# Patient Record
Sex: MALE | Race: WHITE | Employment: UNEMPLOYED | URBAN - METROPOLITAN AREA
[De-identification: names, ages, dates, MRNs, and addresses within clinical notes are randomized per-mention and may not be internally consistent; named-entity substitution may affect disease eponyms.]

---

## 2017-06-23 PROBLEM — K21.9 GERD (GASTROESOPHAGEAL REFLUX DISEASE): Status: ACTIVE | Noted: 2017-06-23

## 2017-06-23 PROBLEM — F41.9 ANXIETY DISORDER: Status: ACTIVE | Noted: 2017-06-23

## 2017-06-23 PROBLEM — F32.A DEPRESSION: Status: ACTIVE | Noted: 2017-06-23

## 2017-06-27 PROBLEM — M25.569 KNEE PAIN: Status: ACTIVE | Noted: 2017-06-27

## 2017-12-12 PROBLEM — R26.9 ABNORMAL GAIT: Status: ACTIVE | Noted: 2017-12-12

## 2018-06-13 PROBLEM — M25.569 KNEE PAIN: Status: RESOLVED | Noted: 2017-06-27 | Resolved: 2018-06-13

## 2018-08-08 RX ORDER — DOXYCYCLINE HYCLATE 100 MG
100 TABLET ORAL 2 TIMES DAILY
Qty: 20 TABLET | Refills: 0 | Status: SHIPPED | OUTPATIENT
Start: 2018-08-08 | End: 2018-08-18

## 2018-09-25 ENCOUNTER — HOSPITAL ENCOUNTER (EMERGENCY)
Age: 58
Discharge: HOME | End: 2018-09-25
Payer: MEDICARE

## 2018-09-25 VITALS
SYSTOLIC BLOOD PRESSURE: 150 MMHG | TEMPERATURE: 98 F | BODY MASS INDEX: 26.34 KG/M2 | HEART RATE: 66 BPM | OXYGEN SATURATION: 98 % | WEIGHT: 184 LBS | HEIGHT: 70 IN | DIASTOLIC BLOOD PRESSURE: 84 MMHG

## 2018-09-25 DIAGNOSIS — R23.3 ABNORMAL BRUISING: Primary | ICD-10-CM

## 2018-09-25 LAB
ALBUMIN SERPL-MCNC: 4.1 G/DL (ref 3.5–5.2)
ALP BLD-CCNC: 118 U/L (ref 40–129)
ALT SERPL-CCNC: 16 U/L (ref 0–40)
ANION GAP SERPL CALCULATED.3IONS-SCNC: 7 MMOL/L (ref 7–16)
APTT: 36.8 SEC (ref 24.5–35.1)
AST SERPL-CCNC: 20 U/L (ref 0–39)
BASOPHILS ABSOLUTE: 0.03 E9/L (ref 0–0.2)
BASOPHILS RELATIVE PERCENT: 0.6 % (ref 0–2)
BILIRUB SERPL-MCNC: <0.2 MG/DL (ref 0–1.2)
BUN BLDV-MCNC: 16 MG/DL (ref 8–23)
CALCIUM SERPL-MCNC: 9 MG/DL (ref 8.6–10.2)
CHLORIDE BLD-SCNC: 101 MMOL/L (ref 98–107)
CO2: 33 MMOL/L (ref 22–29)
CREAT SERPL-MCNC: 0.8 MG/DL (ref 0.7–1.2)
EOSINOPHILS ABSOLUTE: 0.13 E9/L (ref 0.05–0.5)
EOSINOPHILS RELATIVE PERCENT: 2.5 % (ref 0–6)
GFR AFRICAN AMERICAN: >60
GFR NON-AFRICAN AMERICAN: >60 ML/MIN/1.73
GLUCOSE BLD-MCNC: 107 MG/DL (ref 74–109)
HCT VFR BLD CALC: 45.5 % (ref 37–54)
HEMOGLOBIN: 15.3 G/DL (ref 12.5–16.5)
IMMATURE GRANULOCYTES #: 0.02 E9/L
IMMATURE GRANULOCYTES %: 0.4 % (ref 0–5)
INR BLD: 0.9
LYMPHOCYTES ABSOLUTE: 1.91 E9/L (ref 1.5–4)
LYMPHOCYTES RELATIVE PERCENT: 37.2 % (ref 20–42)
MCH RBC QN AUTO: 31.7 PG (ref 26–35)
MCHC RBC AUTO-ENTMCNC: 33.6 % (ref 32–34.5)
MCV RBC AUTO: 94.2 FL (ref 80–99.9)
MONOCYTES ABSOLUTE: 0.62 E9/L (ref 0.1–0.95)
MONOCYTES RELATIVE PERCENT: 12.1 % (ref 2–12)
NEUTROPHILS ABSOLUTE: 2.43 E9/L (ref 1.8–7.3)
NEUTROPHILS RELATIVE PERCENT: 47.2 % (ref 43–80)
PDW BLD-RTO: 14 FL (ref 11.5–15)
PLATELET # BLD: 276 E9/L (ref 130–450)
PMV BLD AUTO: 10.6 FL (ref 7–12)
POTASSIUM SERPL-SCNC: 4.6 MMOL/L (ref 3.5–5)
PROTHROMBIN TIME: 10.1 SEC (ref 9.3–12.4)
RBC # BLD: 4.83 E12/L (ref 3.8–5.8)
SODIUM BLD-SCNC: 141 MMOL/L (ref 132–146)
TOTAL PROTEIN: 6.8 G/DL (ref 6.4–8.3)
WBC # BLD: 5.1 E9/L (ref 4.5–11.5)

## 2018-09-25 PROCEDURE — 36415 COLL VENOUS BLD VENIPUNCTURE: CPT

## 2018-09-25 PROCEDURE — 85025 COMPLETE CBC W/AUTO DIFF WBC: CPT

## 2018-09-25 PROCEDURE — 80053 COMPREHEN METABOLIC PANEL: CPT

## 2018-09-25 PROCEDURE — 99283 EMERGENCY DEPT VISIT LOW MDM: CPT

## 2018-09-25 PROCEDURE — 85730 THROMBOPLASTIN TIME PARTIAL: CPT

## 2018-09-25 PROCEDURE — 85610 PROTHROMBIN TIME: CPT

## 2018-09-26 NOTE — ED PROVIDER NOTES
[facility]    HPI:  9/26/18,   Time: 1:25 AM         Andressa Wilson is a 59 y.o. male presenting to the ED for Ecchymosis to the bilateral forearms, beginning Few days ago. The complaint has been constant, mild in severity, and worsened by nothing. States he called his primary care physician regarding the ecchymosis of the bilateral forearms and was advised to go the emergency room for evaluation. He denies any known coronary injury. He is on no anticoagulants. He denies any liver dysfunction. He denies any shortness of breath. He does take an aspirin daily. If he called the office and was advised he cannot be seen by dermatology until November. ROS:   Pertinent positives and negatives are stated within HPI, all other systems reviewed and are negative.  --------------------------------------------- PAST HISTORY ---------------------------------------------  Past Medical History:  has a past medical history of Anxiety; Depression; Epilepsy ([address] [address] [address] GERD (gastroesophageal reflux disease); Knee pain; Laceration of spleen; and Seizures ([facility] Past Surgical History:  has a past surgical history that includes Ankle surgery (2005); Cholecystectomy (1999); Splenectomy, total (03/04/2015); and Knee arthroscopy (Left, 06/30/2016). Social History:  reports that he has quit smoking. He quit after 5.00 years of use. He quit smokeless tobacco use about 28 years ago. He reports that he does not drink alcohol or use drugs. Family History: family history includes Cancer in his mother; Heart Disease (age of onset: 66) in his father. The patients home medications have been reviewed. Allergies: Patient has no known allergies.     -------------------------------------------------- RESULTS -------------------------------------------------  All laboratory and radiology results have been personally reviewed by myself   LABS:  Results for orders placed or performed during the hospital encounter of 09/25/18

## 2019-02-20 ENCOUNTER — HOSPITAL ENCOUNTER (OUTPATIENT)
Age: 59
Discharge: HOME | End: 2019-02-22
Payer: COMMERCIAL

## 2019-02-20 DIAGNOSIS — Z00.00 ANNUAL PHYSICAL EXAM: ICD-10-CM

## 2019-02-20 DIAGNOSIS — R26.9 ABNORMAL GAIT: ICD-10-CM

## 2019-02-20 DIAGNOSIS — G89.29 CHRONIC MIDLINE LOW BACK PAIN WITHOUT SCIATICA: ICD-10-CM

## 2019-02-20 DIAGNOSIS — G40.909 SEIZURE DISORDER (HCC): ICD-10-CM

## 2019-02-20 DIAGNOSIS — M54.50 CHRONIC MIDLINE LOW BACK PAIN WITHOUT SCIATICA: ICD-10-CM

## 2019-02-20 DIAGNOSIS — F41.8 OTHER SPECIFIED ANXIETY DISORDERS: ICD-10-CM

## 2019-02-20 PROBLEM — F32.A DEPRESSION: Status: RESOLVED | Noted: 2017-06-23 | Resolved: 2019-02-20

## 2019-02-20 LAB
ALBUMIN SERPL-MCNC: 4.1 G/DL (ref 3.5–5.2)
ALP BLD-CCNC: 108 U/L (ref 40–129)
ALT SERPL-CCNC: 13 U/L (ref 0–40)
ANION GAP SERPL CALCULATED.3IONS-SCNC: 9 MMOL/L (ref 7–16)
AST SERPL-CCNC: 18 U/L (ref 0–39)
BASOPHILS ABSOLUTE: 0.03 E9/L (ref 0–0.2)
BASOPHILS RELATIVE PERCENT: 1 % (ref 0–2)
BILIRUB SERPL-MCNC: <0.2 MG/DL (ref 0–1.2)
BILIRUBIN URINE: NEGATIVE
BLOOD, URINE: NEGATIVE
BUN BLDV-MCNC: 15 MG/DL (ref 8–23)
CALCIUM SERPL-MCNC: 9 MG/DL (ref 8.6–10.2)
CHLORIDE BLD-SCNC: 103 MMOL/L (ref 98–107)
CHOLESTEROL, TOTAL: 216 MG/DL (ref 0–199)
CLARITY: CLEAR
CO2: 28 MMOL/L (ref 22–29)
COLOR: YELLOW
CREAT SERPL-MCNC: 0.7 MG/DL (ref 0.7–1.2)
EOSINOPHILS ABSOLUTE: 0.08 E9/L (ref 0.05–0.5)
EOSINOPHILS RELATIVE PERCENT: 2.6 % (ref 0–6)
GFR AFRICAN AMERICAN: >60
GFR NON-AFRICAN AMERICAN: >60 ML/MIN/1.73
GLUCOSE BLD-MCNC: 98 MG/DL (ref 74–99)
GLUCOSE URINE: NEGATIVE MG/DL
HBA1C MFR BLD: 5.1 % (ref 4–5.6)
HCT VFR BLD CALC: 44 % (ref 37–54)
HDLC SERPL-MCNC: 70 MG/DL
HEMOGLOBIN: 14.7 G/DL (ref 12.5–16.5)
IMMATURE GRANULOCYTES #: 0.01 E9/L
IMMATURE GRANULOCYTES %: 0.3 % (ref 0–5)
KETONES, URINE: NEGATIVE MG/DL
LDL CHOLESTEROL CALCULATED: 128 MG/DL (ref 0–99)
LEUKOCYTE ESTERASE, URINE: NEGATIVE
LYMPHOCYTES ABSOLUTE: 1.29 E9/L (ref 1.5–4)
LYMPHOCYTES RELATIVE PERCENT: 42.3 % (ref 20–42)
MCH RBC QN AUTO: 31.4 PG (ref 26–35)
MCHC RBC AUTO-ENTMCNC: 33.4 % (ref 32–34.5)
MCV RBC AUTO: 94 FL (ref 80–99.9)
MONOCYTES ABSOLUTE: 0.38 E9/L (ref 0.1–0.95)
MONOCYTES RELATIVE PERCENT: 12.5 % (ref 2–12)
NEUTROPHILS ABSOLUTE: 1.26 E9/L (ref 1.8–7.3)
NEUTROPHILS RELATIVE PERCENT: 41.3 % (ref 43–80)
NITRITE, URINE: NEGATIVE
PDW BLD-RTO: 14 FL (ref 11.5–15)
PH UA: 7 (ref 5–9)
PHENYTOIN DOSE AMOUNT: NORMAL
PHENYTOIN LEVEL: 18 UG/ML (ref 10–20)
PLATELET # BLD: 280 E9/L (ref 130–450)
PMV BLD AUTO: 11.9 FL (ref 7–12)
POTASSIUM SERPL-SCNC: 4.1 MMOL/L (ref 3.5–5)
PROSTATE SPECIFIC ANTIGEN: 2.19 NG/ML (ref 0–4)
PROTEIN UA: NEGATIVE MG/DL
RBC # BLD: 4.68 E12/L (ref 3.8–5.8)
SODIUM BLD-SCNC: 140 MMOL/L (ref 132–146)
SPECIFIC GRAVITY UA: 1.01 (ref 1–1.03)
TOTAL PROTEIN: 6.9 G/DL (ref 6.4–8.3)
TRIGL SERPL-MCNC: 88 MG/DL (ref 0–149)
TSH SERPL DL<=0.05 MIU/L-ACNC: 1.81 UIU/ML (ref 0.27–4.2)
UROBILINOGEN, URINE: 0.2 E.U./DL
VITAMIN D 25-HYDROXY: 48 NG/ML (ref 30–100)
VLDLC SERPL CALC-MCNC: 18 MG/DL
WBC # BLD: 3.1 E9/L (ref 4.5–11.5)

## 2019-02-20 PROCEDURE — 82306 VITAMIN D 25 HYDROXY: CPT

## 2019-02-20 PROCEDURE — 80061 LIPID PANEL: CPT

## 2019-02-20 PROCEDURE — 83036 HEMOGLOBIN GLYCOSYLATED A1C: CPT

## 2019-02-20 PROCEDURE — 80053 COMPREHEN METABOLIC PANEL: CPT

## 2019-02-20 PROCEDURE — G0103 PSA SCREENING: HCPCS

## 2019-02-20 PROCEDURE — 85025 COMPLETE CBC W/AUTO DIFF WBC: CPT

## 2019-02-20 PROCEDURE — 80185 ASSAY OF PHENYTOIN TOTAL: CPT

## 2019-02-20 PROCEDURE — 84443 ASSAY THYROID STIM HORMONE: CPT

## 2019-02-20 PROCEDURE — 81003 URINALYSIS AUTO W/O SCOPE: CPT

## 2019-03-19 ENCOUNTER — HOSPITAL ENCOUNTER (OUTPATIENT)
Age: 59
Setting detail: OUTPATIENT SURGERY
Discharge: HOME | End: 2019-03-19
Attending: INTERNAL MEDICINE | Admitting: INTERNAL MEDICINE
Payer: COMMERCIAL

## 2019-03-19 ENCOUNTER — ANESTHESIA (OUTPATIENT)
Dept: ENDOSCOPY | Age: 59
End: 2019-03-19
Payer: COMMERCIAL

## 2019-03-19 ENCOUNTER — ANESTHESIA EVENT (OUTPATIENT)
Dept: ENDOSCOPY | Age: 59
End: 2019-03-19
Payer: COMMERCIAL

## 2019-03-19 VITALS
TEMPERATURE: 96.6 F | OXYGEN SATURATION: 97 % | BODY MASS INDEX: 24.08 KG/M2 | WEIGHT: 172 LBS | RESPIRATION RATE: 16 BRPM | HEIGHT: 71 IN | DIASTOLIC BLOOD PRESSURE: 60 MMHG | HEART RATE: 55 BPM | SYSTOLIC BLOOD PRESSURE: 110 MMHG

## 2019-03-19 VITALS
RESPIRATION RATE: 10 BRPM | OXYGEN SATURATION: 97 % | SYSTOLIC BLOOD PRESSURE: 99 MMHG | DIASTOLIC BLOOD PRESSURE: 59 MMHG

## 2019-03-19 PROCEDURE — 6360000002 HC RX W HCPCS: Performed by: NURSE ANESTHETIST, CERTIFIED REGISTERED

## 2019-03-19 PROCEDURE — 3700000000 HC ANESTHESIA ATTENDED CARE: Performed by: INTERNAL MEDICINE

## 2019-03-19 PROCEDURE — 3609010300 HC COLONOSCOPY W/BIOPSY SINGLE/MULTIPLE: Performed by: INTERNAL MEDICINE

## 2019-03-19 PROCEDURE — 7100000011 HC PHASE II RECOVERY - ADDTL 15 MIN: Performed by: INTERNAL MEDICINE

## 2019-03-19 PROCEDURE — 2580000003 HC RX 258: Performed by: ANESTHESIOLOGY

## 2019-03-19 PROCEDURE — 88305 TISSUE EXAM BY PATHOLOGIST: CPT

## 2019-03-19 PROCEDURE — 3700000001 HC ADD 15 MINUTES (ANESTHESIA): Performed by: INTERNAL MEDICINE

## 2019-03-19 PROCEDURE — 2709999900 HC NON-CHARGEABLE SUPPLY: Performed by: INTERNAL MEDICINE

## 2019-03-19 PROCEDURE — 7100000010 HC PHASE II RECOVERY - FIRST 15 MIN: Performed by: INTERNAL MEDICINE

## 2019-03-19 RX ORDER — PROPOFOL 10 MG/ML
INJECTION, EMULSION INTRAVENOUS PRN
Status: DISCONTINUED | OUTPATIENT
Start: 2019-03-19 | End: 2019-03-19 | Stop reason: SDUPTHER

## 2019-03-19 RX ORDER — MIDAZOLAM HYDROCHLORIDE 1 MG/ML
INJECTION INTRAMUSCULAR; INTRAVENOUS PRN
Status: DISCONTINUED | OUTPATIENT
Start: 2019-03-19 | End: 2019-03-19 | Stop reason: SDUPTHER

## 2019-03-19 RX ORDER — 0.9 % SODIUM CHLORIDE 0.9 %
10 VIAL (ML) INJECTION PRN
Status: DISCONTINUED | OUTPATIENT
Start: 2019-03-19 | End: 2019-03-19 | Stop reason: HOSPADM

## 2019-03-19 RX ORDER — 0.9 % SODIUM CHLORIDE 0.9 %
10 VIAL (ML) INJECTION EVERY 12 HOURS SCHEDULED
Status: DISCONTINUED | OUTPATIENT
Start: 2019-03-19 | End: 2019-03-19 | Stop reason: HOSPADM

## 2019-03-19 RX ORDER — FENTANYL CITRATE 50 UG/ML
INJECTION, SOLUTION INTRAMUSCULAR; INTRAVENOUS PRN
Status: DISCONTINUED | OUTPATIENT
Start: 2019-03-19 | End: 2019-03-19 | Stop reason: SDUPTHER

## 2019-03-19 RX ORDER — SODIUM CHLORIDE 0.9 % (FLUSH) 0.9 %
10 SYRINGE (ML) INJECTION PRN
Status: DISCONTINUED | OUTPATIENT
Start: 2019-03-19 | End: 2019-03-19 | Stop reason: HOSPADM

## 2019-03-19 RX ORDER — SODIUM CHLORIDE, SODIUM LACTATE, POTASSIUM CHLORIDE, CALCIUM CHLORIDE 600; 310; 30; 20 MG/100ML; MG/100ML; MG/100ML; MG/100ML
INJECTION, SOLUTION INTRAVENOUS CONTINUOUS
Status: DISCONTINUED | OUTPATIENT
Start: 2019-03-19 | End: 2019-03-19 | Stop reason: HOSPADM

## 2019-03-19 RX ADMIN — FENTANYL CITRATE 50 MCG: 50 INJECTION, SOLUTION INTRAMUSCULAR; INTRAVENOUS at 08:33

## 2019-03-19 RX ADMIN — MIDAZOLAM 2 MG: 1 INJECTION INTRAMUSCULAR; INTRAVENOUS at 08:33

## 2019-03-19 RX ADMIN — FENTANYL CITRATE 50 MCG: 50 INJECTION, SOLUTION INTRAMUSCULAR; INTRAVENOUS at 08:39

## 2019-03-19 RX ADMIN — PROPOFOL 250 MG: 10 INJECTION, EMULSION INTRAVENOUS at 08:50

## 2019-03-19 RX ADMIN — SODIUM CHLORIDE, POTASSIUM CHLORIDE, SODIUM LACTATE AND CALCIUM CHLORIDE: 600; 310; 30; 20 INJECTION, SOLUTION INTRAVENOUS at 07:14

## 2019-03-19 ASSESSMENT — PAIN SCALES - GENERAL: PAINLEVEL_OUTOF10: 0

## 2019-03-19 ASSESSMENT — PAIN - FUNCTIONAL ASSESSMENT: PAIN_FUNCTIONAL_ASSESSMENT: 0-10

## 2019-03-19 ASSESSMENT — ENCOUNTER SYMPTOMS: SHORTNESS OF BREATH: 0

## 2019-05-25 ENCOUNTER — APPOINTMENT (OUTPATIENT)
Dept: CT IMAGING | Age: 59
End: 2019-05-25
Payer: COMMERCIAL

## 2019-05-25 ENCOUNTER — NURSE TRIAGE (OUTPATIENT)
Dept: OTHER | Facility: CLINIC | Age: 59
End: 2019-05-25

## 2019-05-25 ENCOUNTER — APPOINTMENT (OUTPATIENT)
Dept: GENERAL RADIOLOGY | Age: 59
End: 2019-05-25
Payer: COMMERCIAL

## 2019-05-25 ENCOUNTER — HOSPITAL ENCOUNTER (EMERGENCY)
Age: 59
Discharge: HOME | End: 2019-05-25
Attending: EMERGENCY MEDICINE
Payer: COMMERCIAL

## 2019-05-25 VITALS
OXYGEN SATURATION: 97 % | TEMPERATURE: 98.3 F | RESPIRATION RATE: 16 BRPM | DIASTOLIC BLOOD PRESSURE: 75 MMHG | BODY MASS INDEX: 26.32 KG/M2 | WEIGHT: 188 LBS | HEART RATE: 78 BPM | SYSTOLIC BLOOD PRESSURE: 113 MMHG | HEIGHT: 71 IN

## 2019-05-25 DIAGNOSIS — S01.511A LIP LACERATION, INITIAL ENCOUNTER: ICD-10-CM

## 2019-05-25 DIAGNOSIS — S00.12XA CONTUSION OF LEFT EYELID, INITIAL ENCOUNTER: ICD-10-CM

## 2019-05-25 DIAGNOSIS — M25.511 RIGHT SHOULDER PAIN, UNSPECIFIED CHRONICITY: ICD-10-CM
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
## 2019-05-25 DIAGNOSIS — S09.90XA CLOSED HEAD INJURY, INITIAL ENCOUNTER: ICD-10-CM

## 2019-05-25 PROCEDURE — 99284 EMERGENCY DEPT VISIT MOD MDM: CPT

## 2019-05-25 PROCEDURE — 73030 X-RAY EXAM OF SHOULDER: CPT

## 2019-05-25 PROCEDURE — 12013 RPR F/E/E/N/L/M 2.6-5.0 CM: CPT

## 2019-05-25 PROCEDURE — 70486 CT MAXILLOFACIAL W/O DYE: CPT

## 2019-05-25 PROCEDURE — 72125 CT NECK SPINE W/O DYE: CPT

## 2019-05-25 PROCEDURE — 70450 CT HEAD/BRAIN W/O DYE: CPT

## 2019-05-25 RX ORDER — M-VIT,TX,IRON,MINS/CALC/FOLIC 27MG-0.4MG
1 TABLET ORAL DAILY
COMMUNITY

## 2019-05-25 RX ORDER — CYCLOBENZAPRINE HCL 10 MG
10 TABLET ORAL ONCE
Status: DISCONTINUED | OUTPATIENT
Start: 2019-05-25 | End: 2019-05-25 | Stop reason: HOSPADM

## 2019-05-25 RX ORDER — CYCLOBENZAPRINE HCL 5 MG
5 TABLET ORAL 2 TIMES DAILY PRN
Qty: 15 TABLET | Refills: 0 | Status: SHIPPED | OUTPATIENT
Start: 2019-05-25 | End: 2019-06-04

## 2019-05-25 ASSESSMENT — ENCOUNTER SYMPTOMS
COUGH: 0
SORE THROAT: 0
CHEST TIGHTNESS: 0
SHORTNESS OF BREATH: 0
SINUS PAIN: 0
BACK PAIN: 0
NAUSEA: 0
DIARRHEA: 0
VOMITING: 0
ABDOMINAL PAIN: 0

## 2019-05-25 NOTE — ED NOTES
Patient taken for xray and CT scan via cart by transport staff.      Godwin Fallon RN  05/25/19 [id number]

## 2019-05-25 NOTE — ED PROVIDER NOTES
This is a 49-year-old male that presents after a fall today at work. RRT called after he had a fall and is brought down to the emergency department. He has a contusion to his left eyebrow and a laceration to his lip. He denies any pain. Review of Systems   Constitutional: Negative for activity change, chills, fatigue and fever. HENT: Negative for congestion, sinus pain and sore throat. Eyes: Negative for visual disturbance. Respiratory: Negative for cough, chest tightness and shortness of breath. Cardiovascular: Negative for chest pain, palpitations and leg swelling. Gastrointestinal: Negative for abdominal pain, diarrhea, nausea and vomiting. Genitourinary: Negative for dysuria and urgency. Musculoskeletal: Negative for back pain, neck pain and neck stiffness. Skin: Negative for rash. Neurological: Negative for dizziness, syncope and headaches. Psychiatric/Behavioral: Negative for confusion. Physical Exam   Constitutional: He is oriented to person, place, and time. He appears well-developed and well-nourished. No distress. HENT:   Head: Normocephalic and atraumatic. Nose: Nose normal.   Mouth/Throat: Oropharynx is clear and moist. No oropharyngeal exudate. Contusion above the L eye, pain with palpation    There is an approximately 3 cm laceration to the L inner lip, small laceration on the outer lip. No crossing of the vermilion border. No obvious dental or lingual injury. Eyes: Pupils are equal, round, and reactive to light. Conjunctivae and EOM are normal.   Neck: Normal range of motion. Neck supple. Cardiovascular: Normal rate, regular rhythm, normal heart sounds and intact distal pulses. Pulmonary/Chest: Effort normal and breath sounds normal. No respiratory distress. He exhibits no tenderness. Abdominal: Soft. Bowel sounds are normal. He exhibits no distension. There is no tenderness. Musculoskeletal: Normal range of motion. He exhibits no edema. Neurological: He is alert and oriented to person, place, and time. No cranial nerve deficit or sensory deficit. He exhibits normal muscle tone. Skin: Skin is warm and dry. Capillary refill takes less than 2 seconds. He is not diaphoretic. Psychiatric: He has a normal mood and affect. His behavior is normal. Judgment and thought content normal.   Nursing note and vitals reviewed. Lac Repair  Date/Time: 5/25/2019 3:46 PM  Performed by: Maria Elena Lazaro DO  Authorized by: Tim Schneider DO     Consent:     Consent obtained:  Verbal    Consent given by:  Patient    Risks discussed:  Infection, pain, poor cosmetic result, poor wound healing, nerve damage and need for additional repair  Anesthesia (see MAR for exact dosages): Anesthesia method:  Local infiltration    Local anesthetic:  Lidocaine 1% w/o epi  Laceration details:     Location:  Lip    Length (cm):  3  Repair type:     Repair type:  Simple  Pre-procedure details:     Preparation:  Patient was prepped and draped in usual sterile fashion and imaging obtained to evaluate for foreign bodies  Exploration:     Contaminated: no    Treatment:     Area cleansed with:  Saline    Amount of cleaning:  Extensive    Irrigation solution:  Sterile water    Irrigation method:  Pressure wash  Skin repair:     Repair method:  Sutures    Suture size:  4-0    Suture material:  Chromic gut    Suture technique:  Simple interrupted    Number of sutures:  5  Approximation:     Approximation:  Close    Vermilion border: well-aligned    Post-procedure details:     Dressing:  Open (no dressing)    Patient tolerance of procedure: Tolerated well, no immediate complications        MDM  Number of Diagnoses or Management Options  Diagnosis management comments: 15-year-old male presents after a fall today at work. He lost his balance, did not have a syncopal event. Denies any dizziness, chest pain or shortness of breath.  He has a history of falls, this is not abnormal for him. Currently, he denies pain. He has a contusion to the left eyebrow and a laceration to the left lip. Tetanus is up-to-date. ED Course as of May 25 1558   Sat May 25, 2019   1325 ATTENDING PROVIDER ATTESTATION:     I have personally performed and/or participated in the history, exam, medical decision making, and procedures and agree with all pertinent clinical information unless otherwise noted. I have also reviewed and agree with the past medical, family and social history unless otherwise noted. I have discussed this patient in detail with the resident, and provided the instruction and education regarding patient here after a rapid response upstairs. He states that he lost his balance and fell striking the left side of his face and head. Denies loss of consciousness. Denies lightheadedness. Denies neck or back pain. Denies arm or leg pain or injuries. States that he has trouble with his balance chronically and has fallen before. Wears a brace on his leg. Denies chest pain or abdominal pain or other injuries. .  My findings/plan: Patient with some bruising above the left eyebrow region with an abrasion but no deep laceration. Has a small laceration to the left upper lip. Bleeding controlled. No dental injury. No other facial bone tenderness or injuries. No other sign of acute head or face injury. No septal hematoma. No hemotympanum. Chest wall nontender. Breath sounds are equal. Heart rate regular. Abdomen soft and nontender in all quadrants. Arms and legs show no signs of acute bony or joint injuries. He is awake and alert and conversant. [NC]   7006 Sutures applied, bleeding has stopped. Patient feels well.     [CW]      ED Course User Index  [CW] Michael Wong DO  [NC] Alyssa Dan DO       --------------------------------------------- PAST HISTORY ---------------------------------------------  Past Medical History:  has a past medical history of Anxiety, Depression, Epilepsy ([facility] GERD (gastroesophageal reflux disease), Knee pain, Laceration of spleen, and Seizures ([facility] Past Surgical History:  has a past surgical history that includes Ankle surgery (2005); Cholecystectomy (1999); Splenectomy, total (03/04/2015); Knee arthroscopy (Left, 06/30/2016); and Colonoscopy (N/A, 3/19/2019). Social History:  reports that he has quit smoking. He quit after 5.00 years of use. He quit smokeless tobacco use about 29 years ago. He reports that he drinks alcohol. He reports that he does not use drugs. Family History: family history includes Cancer in his mother; Heart Disease (age of onset: 66) in his father. The patients home medications have been reviewed. Allergies: Patient has no known allergies. -------------------------------------------------- RESULTS -------------------------------------------------  Labs:  No results found for this visit on 05/25/19. Radiology:  XR SHOULDER RIGHT (MIN 2 VIEWS)   Final Result   No fracture, dislocation, soft tissue or osseous   abnormality. CT Head WO Contrast   Final Result   1. There is no acute intracranial hemorrhage. 2. 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 3. Left supraorbital soft tissue hematoma      CT Cervical Spine WO Contrast   Final Result   1. There is no gross fracture or subluxation of the cervical spine   2. Motion artifact degrades the images. CT FACIAL BONES WO CONTRAST   Final Result   1. Left supraorbital soft tissue contusion/hematoma. 2. There is no fracture of the maxillofacial region. 3. The paranasal sinuses are well pneumatized.              ------------------------- NURSING NOTES AND VITALS REVIEWED ---------------------------  Date / Time Roomed:  5/25/2019  1:03 PM  ED Bed Assignment:  14/14    The nursing notes within the ED encounter and vital signs as below have been reviewed.    /75   Pulse 78   Temp 98.3 °F (36.8 °C) (Oral)   Resp 16   Ht 5' 10.5\" (1.791 m)   Wt

## 2019-06-24 ENCOUNTER — OFFICE VISIT (OUTPATIENT)
Dept: ORTHOPEDIC SURGERY | Age: 59
End: 2019-06-24
Payer: COMMERCIAL

## 2019-06-24 VITALS — HEIGHT: 71 IN | BODY MASS INDEX: 25.48 KG/M2 | WEIGHT: 182 LBS

## 2019-06-24 DIAGNOSIS — S40.021A CONTUSION, SHOULDER AND UPPER ARM, MULTIPLE SITES, RIGHT, INITIAL ENCOUNTER: Primary | ICD-10-CM

## 2019-06-24 DIAGNOSIS — S40.011A CONTUSION, SHOULDER AND UPPER ARM, MULTIPLE SITES, RIGHT, INITIAL ENCOUNTER: Primary | ICD-10-CM

## 2019-06-24 PROCEDURE — 99203 OFFICE O/P NEW LOW 30 MIN: CPT | Performed by: ORTHOPAEDIC SURGERY

## 2019-06-24 NOTE — PROGRESS NOTES
Washington Cramer is a 62 y.o. male, who presents   Chief Complaint   Patient presents with    New Patient     new patient for right shoulder, patient states he fell and landed on his shoulder. He does have films in [employer], he describes his pain as stabbing. HPI[de-identified] Work-related injury occurred 5/25/2019. Patient is employed at [employer] in housekeeping. He was on sixth floor and apparently reach for the railing on the wall and missed this. He struck his right arm and then went towards his left side and hit his face in multiple places resulting in a laceration to his left upper lip and also a contusion to his left orbital area. He did go to the emergency room at [employer] and was seen by Dr. Lita Avina. X-rays of the right shoulder were taken there and his lip was sutured up and he was referred for follow-up at [facility]. He has continued to have pain in the right shoulder and the mid right arm area. His lip seems to be healing up well and the sutures apparently have dissolved. He denies any numbness but has pain with certain movements of his right arm and shoulder. Allergies; medications; past medical, surgical, family, and social history; and problem list have been reviewed today and updated as indicated in this encounter - see below following Ortho specifics. Musculoskeletal: The left orbital area color is returning to normal.  There are no lacerations. There is no obvious swelling at this time. The left upper lip looks good now. I see no sutures in place. The right upper extremity has good skin condition and gross neurovascular function. There is some guarded range of motion in the right shoulder with discomfort at upper elevations above 150 degrees of elevation. Strength is guarded because of the discomfort. The acromioclavicular and glenohumeral joints are grossly stable. There is crepitus in the arch area with range of motion of the shoulder.   Strength is fair to good with no grossly obvious cuff tear though examination was somewhat limited by discomfort and resultant effort. Tenderness is present with slight prominence in the lateral mid arm with no discoloration. Radiologic Studies: Imaging 2 views of the right shoulder 5/25/2019 shows some narrowing of the acromioclavicular joint but good contours and joint space in the glenohumeral joint and also in the subacromial space. ASSESSMENT:  Jemima was seen today for new patient. Diagnoses and all orders for this visit:    Contusion, shoulder and upper arm, multiple sites, right, initial encounter     Treatment alternatives were reviewed including medical and physical therapies, injections, and surgical options, expected risks benefits and likely outcome of each were discussed in detail, questions asked and answered and understood. PLAN: We will seek approval for physical therapy for the right arm. Follow-up here in 3 weeks after beginning of therapy.         Patient Active Problem List   Diagnosis    Seizure disorder ([address]    Diastolic murmur    Anxiety disorder    Gastroesophageal reflux disease without esophagitis    Abnormal gait       Past Medical History:   Diagnosis Date    Anxiety     Depression     Epilepsy ([address]     GERD (gastroesophageal reflux disease)     Knee pain     Laceration of spleen     Seizures ([address]     last seizure 1996  controlled with dilantin       Past Surgical History:   Procedure Laterality Date    ANKLE SURGERY  2005    right ankle has plates and screws    CHOLECYSTECTOMY  1999    COLONOSCOPY N/A 3/19/2019    COLONOSCOPY WITH BIOPSY performed by Vivien Taylor DO at [facility] ARTHROSCOPY Left 06/30/2016    Arthroscopy left knee with synovectomy chrondroplasty lateral menisectomy and debridement tear anterior cruciate ligament    SPLENECTOMY, TOTAL  03/04/2015    Dr Emiliana Quintanilla       Current Outpatient Medications   Medication Sig Dispense Refill    ibuprofen (ADVIL;MOTRIN) 800 MG tablet Take 1 tablet by mouth 4 times daily as needed for Pain 120 tablet 1    Multiple Vitamins-Minerals (THERAPEUTIC MULTIVITAMIN-MINERALS) tablet Take 1 tablet by mouth daily      phenytoin (DILANTIN) 100 MG ER capsule Take 1 capsule by mouth 3 times daily 270 capsule 1    omeprazole (PRILOSEC) 20 MG delayed release capsule Take 1 capsule by mouth daily 90 capsule 1    vitamin D (CHOLECALCIFEROL) 1000 UNIT TABS tablet Take 1,000 Units by mouth daily       No current facility-administered medications for this visit.       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[employer]   Social Needs    Financial resource strain: None    Food insecurity:     Worry: None     Inability: None    Transportation needs:     Medical: None     Non-medical: None   Tobacco Use    Smoking status: Former Smoker     Years: 5.00    Smokeless tobacco: Former User     Quit date: 1990   Substance and Sexual Activity    Alcohol use: Yes     Comment: occasional    Drug use: No    Sexual activity: None   Lifestyle    Physical activity:     Days per week: None     Minutes per session: None    Stress: None   Relationships    Social connections:     Talks on phone: None     Gets together: None     Attends [employer] service: None     Active member of club or organization: None     Attends meetings of clubs or organizations: None     Relationship status: None    Intimate partner violence:     Fear of current or ex partner: None     Emotionally abused: None     Physically abused: None Forced sexual activity: None   Other Topics Concern    None   Social History Narrative    None       Family History   Problem Relation Age of Onset   [de-identified] Cancer Mother          at age of 66, breast cancer    Heart Disease Father 66         Review of Systems:   As follows except as previously noted in HPI:  Constitutional: Negative for chills, diaphoresis,  fever   Respiratory: Negative for cough, shortness of breath and wheezing. Cardiovascular: Negative for chest pain and palpitations. Neurological: Negative for dizziness, syncope,   GI / : abdominal pain or cramping  Musculoskeletal: see HPI       Objective:   Physical Exam   Constitutional: Oriented to person, place, and time. and appears well-developed and well-nourished. :   Head: Normocephalic and atraumatic. Neck: Neck supple. Eyes: EOM are normal.   Pulmonary/Chest: Effort normal.  No respiratory distress, no wheezes. Neurological: Alert and oriented to person  Skin: Skin is warm and dry. Sera King DO    19  10:29 AM    All reasonable efforts have been made to minimize the risk of errors that may occur in the use of voice recognition and other electronic means of charting.

## 2019-07-15 ENCOUNTER — OFFICE VISIT (OUTPATIENT)
Dept: ORTHOPEDIC SURGERY | Age: 59
End: 2019-07-15
Payer: COMMERCIAL

## 2019-07-15 VITALS — BODY MASS INDEX: 25.48 KG/M2 | WEIGHT: 182 LBS | HEIGHT: 71 IN

## 2019-07-15 DIAGNOSIS — S40.021A CONTUSION, SHOULDER AND UPPER ARM, MULTIPLE SITES, RIGHT, INITIAL ENCOUNTER: Primary | ICD-10-CM

## 2019-07-15 DIAGNOSIS — M79.601 RIGHT ARM PAIN: ICD-10-CM

## 2019-07-15 DIAGNOSIS — S40.011A CONTUSION, SHOULDER AND UPPER ARM, MULTIPLE SITES, RIGHT, INITIAL ENCOUNTER: Primary | ICD-10-CM

## 2019-07-15 PROCEDURE — 99212 OFFICE O/P EST SF 10 MIN: CPT | Performed by: ORTHOPAEDIC SURGERY

## 2019-07-23 ENCOUNTER — EVALUATION (OUTPATIENT)
Dept: PHYSICAL THERAPY | Age: 59
End: 2019-07-23
Payer: COMMERCIAL

## 2019-07-23 DIAGNOSIS — M79.601 PAIN, ARM, RIGHT: ICD-10-CM

## 2019-07-23 DIAGNOSIS — S40.011A CONTUSION OF MULTIPLE SITES OF SHOULDER, RIGHT, INITIAL ENCOUNTER: Primary | ICD-10-CM

## 2019-07-23 PROCEDURE — 97163 PT EVAL HIGH COMPLEX 45 MIN: CPT | Performed by: PHYSICAL THERAPIST

## 2019-07-23 PROCEDURE — 97110 THERAPEUTIC EXERCISES: CPT | Performed by: PHYSICAL THERAPIST

## 2019-07-25 ENCOUNTER — TREATMENT (OUTPATIENT)
Dept: PHYSICAL THERAPY | Age: 59
End: 2019-07-25
Payer: COMMERCIAL

## 2019-07-25 DIAGNOSIS — M79.601 PAIN, ARM, RIGHT: ICD-10-CM

## 2019-07-25 DIAGNOSIS — S40.011A CONTUSION OF MULTIPLE SITES OF SHOULDER, RIGHT, INITIAL ENCOUNTER: Primary | ICD-10-CM

## 2019-07-25 PROCEDURE — 97110 THERAPEUTIC EXERCISES: CPT

## 2019-07-25 NOTE — PROGRESS NOTES
Physical Therapy Daily Treatment Note    Date: 2019  Patient Name: Kimberley Burn  : 1960   MRN: [id number]  DOInjury: 19  DOSx:    Referring Provider:  Jj Zaidi DO     Medical Diagnosis:      Diagnosis Orders   1. Contusion of multiple sites of shoulder, right, initial encounter     2. Pain, arm, right         Outcome Measure:      S: pt reports sore today  O: Pt given written HEP  Time 5470-6771     Visit 2/ Repeat outcome measure at mid point and end. Pain 7/10     ROM AROM: 130° Forward elevation,  15° ER,  IR to R PSIS  PROM: 135° Forward elevation,  90° ER,  20° IR     Modalities            Manual                  Stretch      Table slides      Wall Flexion      Wall ER stretch      Towel IR stretch 30x     IR reaching behind back      Exercise      Shrugs AROM      Pendulum Ex 2 x 20     UBE      Pulleys - flex 4 min     Pulleys-IR 3 min     Supine wand chest press 2 x 20     Supine wand flex 2 x 20     Supine wand ER/IR 2 x 20     Supine flexion      S-lying ABD      S-lying ER      Standing wand flex 2 x 20     Standing flexion      Standing ABD            ROWS: H Functional activities  To aid in ROM and strength needed for reaching , lifting ,pushing and pulling at home/work    ROWS: M  \"    ROWS: L  \"    ER  \"    IR  \"                A:  Tolerated well. Above added to written HEP.   P: Continue with rehab plan  Amado Bartlett, PTA    Treatment Charges: Mins Units   Initial Evaluation     Re-Evaluation     Ther Exercise         TE 30 2   Manual Therapy     MT     Ther Activities        TA     Gait Training          GT     Neuro Re-education NR     Modalities     Non-Billable Service Time     Other     Total Time/Units 30 2

## 2019-07-31 ENCOUNTER — TREATMENT (OUTPATIENT)
Dept: PHYSICAL THERAPY | Age: 59
End: 2019-07-31
Payer: COMMERCIAL

## 2019-07-31 DIAGNOSIS — M79.601 PAIN, ARM, RIGHT: ICD-10-CM

## 2019-07-31 DIAGNOSIS — S40.011A CONTUSION OF MULTIPLE SITES OF SHOULDER, RIGHT, INITIAL ENCOUNTER: Primary | ICD-10-CM

## 2019-07-31 PROCEDURE — 97110 THERAPEUTIC EXERCISES: CPT

## 2019-07-31 NOTE — PROGRESS NOTES
Physical Therapy Daily Treatment Note    Date: 2019  Patient Name: Bradley Shen  : 1960   MRN: [id number]  DOInjury: 19  DOSx:    Referring Provider:  Celena Mahmood DO     Medical Diagnosis:      Diagnosis Orders   1. Contusion of multiple sites of shoulder, right, initial encounter     2. Pain, arm, right         Outcome Measure: Outcome Measure:   QuickDASH (Disorders of the Arm, Shoulder, and Hand) 41% disability    S: pt reports sore today, no changes  O: Pt given written HEP  Time 7762-9945     Visit 3/ Repeat outcome measure at mid point and end. Pain 7/10     ROM AROM: 130° Forward elevation,  15° ER,  IR to R PSIS  PROM: 135° Forward elevation,  90° ER,  20° IR     Modalities            Manual                  Stretch      Table slides      Wall Flexion      Wall ER stretch      Towel IR stretch 30x     IR reaching behind back      Exercise      Shrugs AROM      Pendulum Ex 2 x 20     UBE      Pulleys - flex 4 min     Pulleys-IR 3 min     Supine wand chest press 2 x 20     Supine wand flex 2 x 20     Supine wand ER/IR 2 x 20     Supine flexion      S-lying ABD      S-lying ER      Standing wand flex 2 x 20     Standing wand IR 2 x 20     Standing ABD            ROWS: H Functional activities  To aid in ROM and strength needed for reaching , lifting ,pushing and pulling at home/work    ROWS: M  \"    ROWS: L  \"    ER  \"    IR  \"                A:  Tolerated well. Above added to written HEP.   P: Continue with rehab plan  Daisy Pham PTA    Treatment Charges: Mins Units   Initial Evaluation     Re-Evaluation     Ther Exercise         TE 30 2   Manual Therapy     MT     Ther Activities        TA     Gait Training          GT     Neuro Re-education NR     Modalities     Non-Billable Service Time     Other     Total Time/Units 30 2

## 2019-08-02 ENCOUNTER — TREATMENT (OUTPATIENT)
Dept: PHYSICAL THERAPY | Age: 59
End: 2019-08-02
Payer: COMMERCIAL

## 2019-08-02 DIAGNOSIS — S40.011A CONTUSION OF MULTIPLE SITES OF SHOULDER, RIGHT, INITIAL ENCOUNTER: Primary | ICD-10-CM

## 2019-08-02 DIAGNOSIS — M79.601 PAIN, ARM, RIGHT: ICD-10-CM

## 2019-08-02 PROCEDURE — 97110 THERAPEUTIC EXERCISES: CPT

## 2019-08-05 ENCOUNTER — TREATMENT (OUTPATIENT)
Dept: PHYSICAL THERAPY | Age: 59
End: 2019-08-05
Payer: COMMERCIAL

## 2019-08-05 ENCOUNTER — OFFICE VISIT (OUTPATIENT)
Dept: ORTHOPEDIC SURGERY | Age: 59
End: 2019-08-05
Payer: COMMERCIAL

## 2019-08-05 VITALS — HEIGHT: 71 IN | BODY MASS INDEX: 25.9 KG/M2 | WEIGHT: 185 LBS

## 2019-08-05 DIAGNOSIS — S40.011A CONTUSION, SHOULDER AND UPPER ARM, MULTIPLE SITES, RIGHT, INITIAL ENCOUNTER: Primary | ICD-10-CM

## 2019-08-05 DIAGNOSIS — M79.601 RIGHT ARM PAIN: ICD-10-CM

## 2019-08-05 DIAGNOSIS — S40.021A CONTUSION, SHOULDER AND UPPER ARM, MULTIPLE SITES, RIGHT, INITIAL ENCOUNTER: Primary | ICD-10-CM

## 2019-08-05 DIAGNOSIS — S40.011A CONTUSION OF MULTIPLE SITES OF SHOULDER, RIGHT, INITIAL ENCOUNTER: Primary | ICD-10-CM

## 2019-08-05 DIAGNOSIS — M79.601 PAIN, ARM, RIGHT: ICD-10-CM

## 2019-08-05 PROCEDURE — 97110 THERAPEUTIC EXERCISES: CPT | Performed by: PHYSICAL THERAPIST

## 2019-08-05 PROCEDURE — 99212 OFFICE O/P EST SF 10 MIN: CPT | Performed by: ORTHOPAEDIC SURGERY

## 2019-08-05 NOTE — PROGRESS NOTES
Chief Complaint:   Chief Complaint   Patient presents with    Follow-up     follow up from right shoulder pain, patient is going to PT 2 x a week. [address] Sant[address]aria has improved with physical therapy which she is taken here at [facility]. Range of motion is increased some he still has some discomfort with certain maneuvers of his arm he indicates pain in the lateral arm actually closer to the elbow than the shoulder. He feels tight when he takes the arm into slight abduction and extension. Allergies; medications; past medical, surgical, family, and social history; and problem list have been reviewed today and updated as indicated in this encounter seen below. Exam: Skin condition gross neurovascular function are good in the right upper extremity. There is prominence at the [facility] joint or at least the distal clavicle with some enlargement in this area. Active range of motion of the shoulder is to 90 degrees or more flexion. He is able to take it into extension of about 15 to 20 degrees. He guards abduction and extension as mentioned. Abduction is somewhat uncomfortable for him and elevation and external rotation is also uncomfortable though he can get to the occiput. Internal rotation is to the right sacroiliac joint. Cuff power is fair. ASSESSMENT:    Jemima was seen today for follow-up. Diagnoses and all orders for this visit:    Contusion, shoulder and upper arm, multiple sites, right, initial encounter    Right arm pain        PLAN: Continue physical therapy and home exercises. Will follow in 4 weeks. Will be to give this rotator cuff a chance to strengthen if it will before further evaluation. Return in about 4 weeks (around 9/2/2019).        Current Outpatient Medications   Medication Sig Dispense Refill    ibuprofen (ADVIL;MOTRIN) 800 MG tablet Take 1 tablet by mouth 4 times daily as needed for Pain 120 tablet 1    Multiple Vitamins-Minerals (THERAPEUTIC

## 2019-08-07 ENCOUNTER — TREATMENT (OUTPATIENT)
Dept: PHYSICAL THERAPY | Age: 59
End: 2019-08-07
Payer: COMMERCIAL

## 2019-08-07 DIAGNOSIS — S40.011A CONTUSION OF MULTIPLE SITES OF SHOULDER, RIGHT, INITIAL ENCOUNTER: Primary | ICD-10-CM

## 2019-08-07 DIAGNOSIS — M79.601 PAIN, ARM, RIGHT: ICD-10-CM

## 2019-08-07 PROCEDURE — 97110 THERAPEUTIC EXERCISES: CPT

## 2019-08-13 ENCOUNTER — TREATMENT (OUTPATIENT)
Dept: PHYSICAL THERAPY | Age: 59
End: 2019-08-13
Payer: COMMERCIAL

## 2019-08-13 DIAGNOSIS — M79.601 PAIN, ARM, RIGHT: ICD-10-CM

## 2019-08-13 DIAGNOSIS — S40.011A CONTUSION OF MULTIPLE SITES OF SHOULDER, RIGHT, INITIAL ENCOUNTER: Primary | ICD-10-CM

## 2019-08-13 PROCEDURE — 97110 THERAPEUTIC EXERCISES: CPT | Performed by: PHYSICAL THERAPIST

## 2019-08-14 ENCOUNTER — TREATMENT (OUTPATIENT)
Dept: PHYSICAL THERAPY | Age: 59
End: 2019-08-14
Payer: COMMERCIAL

## 2019-08-14 DIAGNOSIS — M79.601 PAIN, ARM, RIGHT: ICD-10-CM

## 2019-08-14 DIAGNOSIS — S40.011A CONTUSION OF MULTIPLE SITES OF SHOULDER, RIGHT, INITIAL ENCOUNTER: Primary | ICD-10-CM

## 2019-08-14 PROCEDURE — 97110 THERAPEUTIC EXERCISES: CPT

## 2019-08-14 NOTE — PROGRESS NOTES
Physical Therapy Daily Treatment Note    Date: 2019  Patient Name: Chidi Tafoya  : 1960   MRN: [id number]  DOInjury: 19  DOSx:    Referring Provider:  Yousuf Miguel DO     Medical Diagnosis:       Diagnosis Orders   1. Contusion of multiple sites of shoulder, right, initial encounter      2. Pain, arm, right         Outcome Measure: Outcome Measure:   QuickDASH (Disorders of the Arm, Shoulder, and Hand) 41% disability    S: Pt con't to need to use Ibuprofen every morning for pain relief. Pt is also noticing tingling down into R hand. Pt found out today that he no longer has his job. O: Pt given written HEP  Time 1:00-1:45     Visit 7/ Repeat outcome measure at mid point and end. Pain 8/10     ROM AROM: 130° Forward elevation,  15° ER,  IR to R PSIS  PROM: 135° Forward elevation,  90° ER,  20° IR     Modalities            Manual                  Stretch      Table slides      Wall Flexion      Wall ER stretch      Towel IR stretch 30x  TE   IR reaching behind back      Exercise      Shrugs AROM      Pendulum Ex 2 x 20  TE   UBE      Pulleys - flex 4 min  TE   Pulleys-IR 3 min  TE   Supine wand chest press 2 x 20 Black TE   Supine wand flex 2 x 20 \" TE   Supine wand ER/IR 2 x 20 \" TE   Supine flexion 1# 2 x 20  TE   S-lying ABD      S-lying ER 1# 2 x 20  TE   Standing wand flex 2 x 20 Black  TE   Standing wand IR 2 x 20 \" TE   Standing ABD            ROWS: H Blue 2 x 20  TE   ROWS: M Blue 2 x 20  TE   ROWS: L      ER      IR      Shoulder shrugs 4# 2 x 20  TE   Forward raise 4# 2 x 20  TE   Shoulder press 4# 2 x 20  TE         A:  Tolerated well. Above added to written HEP.   P: Continue with rehab plan  Allan Bernabe PTA    Treatment Charges: Mins Units   Initial Evaluation     Re-Evaluation     Ther Exercise         TE 30 2   Manual Therapy     MT     Ther Activities        TA     Gait Training          GT     Neuro Re-education NR     Modalities     Non-Billable Service Time

## 2019-08-16 ENCOUNTER — TREATMENT (OUTPATIENT)
Dept: PHYSICAL THERAPY | Age: 59
End: 2019-08-16

## 2019-08-16 DIAGNOSIS — S40.011A CONTUSION OF MULTIPLE SITES OF SHOULDER, RIGHT, INITIAL ENCOUNTER: Primary | ICD-10-CM

## 2019-08-16 DIAGNOSIS — M79.601 PAIN, ARM, RIGHT: ICD-10-CM

## 2019-08-16 PROCEDURE — 97110 THERAPEUTIC EXERCISES: CPT

## 2019-08-16 NOTE — PROGRESS NOTES
Physical Therapy Daily Treatment Note    Date: 2019  Patient Name: Kimberley Burn  : 1960   MRN: [id number]  DOInjury: 19  DOSx:    Referring Provider:  Jj Zaidi DO     Medical Diagnosis:       Diagnosis Orders   1. Contusion of multiple sites of shoulder, right, initial encounter      2. Pain, arm, right         Outcome Measure: Outcome Measure:   QuickDASH (Disorders of the Arm, Shoulder, and Hand) 41% disability    S: Pt con't to need to use Ibuprofen every morning for pain relief. Pt is also noticing tingling down into R hand. O: Pt given written HEP  Time 1:30-2:15     Visit 8/ Repeat outcome measure at mid point and end. Pain 7/10     ROM AROM: 130° Forward elevation,  15° ER,  IR to R PSIS  PROM: 135° Forward elevation,  90° ER,  20° IR     Modalities            Manual                  Stretch      Table slides      Wall Flexion      Wall ER stretch      Towel IR stretch 30x HEP TE   IR reaching behind back      Exercise      Shrugs AROM      Pendulum Ex   TE   UBE      Pulleys - flex 4 min  TE   Pulleys-IR 3 min  TE   Supine wand chest press 2 x 20 Black TE   Supine wand flex 2 x 20 \" TE   Supine wand ER/IR 2 x 20 \" TE   Supine flexion 2# 2 x 20  TE   S-lying ABD      S-lying ER 2# 2 x 20  TE   Standing wand flex 2 x 20 Black  TE   Standing wand IR 2 x 20 \" TE   Standing ABD            ROWS: H Blue 2 x 20  TE   ROWS: M Blue 2 x 20  TE   ROWS: L      ER      IR      Shoulder shrugs 4# 2 x 20  TE   Forward raise 4# 2 x 20  TE   Shoulder press 4# 2 x 20  TE         A:  Tolerated well. Above added to written HEP.   P: Continue with rehab plan  Darin Band, PTA    Treatment Charges: Mins Units   Initial Evaluation     Re-Evaluation     Ther Exercise         TE 45 3   Manual Therapy     MT     Ther Activities        TA     Gait Training          GT     Neuro Re-education NR     Modalities     Non-Billable Service Time     Other     Total Time/Units 45 3

## 2019-08-19 ENCOUNTER — TREATMENT (OUTPATIENT)
Dept: PHYSICAL THERAPY | Age: 59
End: 2019-08-19
Payer: COMMERCIAL

## 2019-08-19 DIAGNOSIS — S40.011A CONTUSION OF MULTIPLE SITES OF SHOULDER, RIGHT, INITIAL ENCOUNTER: Primary | ICD-10-CM

## 2019-08-19 DIAGNOSIS — M79.601 PAIN, ARM, RIGHT: ICD-10-CM

## 2019-08-19 PROCEDURE — 97110 THERAPEUTIC EXERCISES: CPT | Performed by: PHYSICAL THERAPIST

## 2019-08-19 NOTE — PROGRESS NOTES
Physical Therapy Daily Treatment Note    Date: 2019  Patient Name: Sunny Farias  : 1960   MRN: [id number]  DOInjury: 19  DOSx:    Referring Provider:  Gian Jimenez DO     Medical Diagnosis:       Diagnosis Orders   1. Contusion of multiple sites of shoulder, right, initial encounter      2. Pain, arm, right         Outcome Measure: Outcome Measure:   QuickDASH (Disorders of the Arm, Shoulder, and Hand) 41% disability    S: Pt con't to need to use Ibuprofen every morning for pain relief. Pt is also noticing tingling down into R hand. O: Pt given written HEP  Time 2306-9127     Visit 9/ Repeat outcome measure at mid point and end. Pain 7/10     ROM AROM: 130° Forward elevation,  15° ER,  IR to R PSIS  PROM: 135° Forward elevation,  90° ER,  20° IR     Modalities            Manual                  Stretch      Table slides      Wall Flexion      Wall ER stretch      Towel IR stretch 30x HEP TE   IR reaching behind back      Exercise      Shrugs AROM      Pendulum Ex   TE   UBE      Pulleys - flex 4 min  TE   Pulleys-IR 3 min  TE   Supine wand chest press 2 x 20 Black TE   Supine wand flex 2 x 20 \" TE   Supine wand ER/IR 2 x 20 \" TE   Supine flexion 2# 2 x 20  TE   S-lying ABD      S-lying ER 2# 2 x 20  TE   Standing wand flex 2 x 20 Black  TE   Standing wand IR 2 x 20 \" TE   Standing ABD            ROWS: H Blue 2 x 20  TE   ROWS: M Blue 2 x 20  TE   ROWS: L      ER      IR      Shoulder shrugs 4# 2 x 20  TE   Forward raise 4# 2 x 20  TE   Shoulder press 4# 2 x 20  TE         A:  Tolerated well. Above added to written HEP.   P: Continue with rehab plan  Bay Pretty PTA    Treatment Charges: Mins Units   Initial Evaluation     Re-Evaluation     Ther Exercise         TE 45 3   Manual Therapy     MT     Ther Activities        TA     Gait Training          GT     Neuro Re-education NR     Modalities     Non-Billable Service Time     Other     Total Time/Units 45 3

## 2019-08-21 ENCOUNTER — TREATMENT (OUTPATIENT)
Dept: PHYSICAL THERAPY | Age: 59
End: 2019-08-21
Payer: COMMERCIAL

## 2019-08-21 DIAGNOSIS — S40.011A CONTUSION OF MULTIPLE SITES OF SHOULDER, RIGHT, INITIAL ENCOUNTER: Primary | ICD-10-CM

## 2019-08-21 DIAGNOSIS — M79.601 PAIN, ARM, RIGHT: ICD-10-CM

## 2019-08-21 PROCEDURE — 97110 THERAPEUTIC EXERCISES: CPT

## 2019-08-23 ENCOUNTER — TREATMENT (OUTPATIENT)
Dept: PHYSICAL THERAPY | Age: 59
End: 2019-08-23

## 2019-08-23 DIAGNOSIS — M79.601 PAIN, ARM, RIGHT: ICD-10-CM

## 2019-08-23 DIAGNOSIS — S40.011A CONTUSION OF MULTIPLE SITES OF SHOULDER, RIGHT, INITIAL ENCOUNTER: Primary | ICD-10-CM

## 2019-08-23 PROCEDURE — 97110 THERAPEUTIC EXERCISES: CPT

## 2019-08-26 ENCOUNTER — TREATMENT (OUTPATIENT)
Dept: PHYSICAL THERAPY | Age: 59
End: 2019-08-26
Payer: COMMERCIAL

## 2019-08-26 DIAGNOSIS — M79.601 PAIN, ARM, RIGHT: ICD-10-CM

## 2019-08-26 DIAGNOSIS — S40.011A CONTUSION OF MULTIPLE SITES OF SHOULDER, RIGHT, INITIAL ENCOUNTER: Primary | ICD-10-CM

## 2019-08-26 PROCEDURE — 97530 THERAPEUTIC ACTIVITIES: CPT

## 2019-08-26 PROCEDURE — 97110 THERAPEUTIC EXERCISES: CPT

## 2019-08-28 ENCOUNTER — TREATMENT (OUTPATIENT)
Dept: PHYSICAL THERAPY | Age: 59
End: 2019-08-28
Payer: COMMERCIAL

## 2019-08-28 DIAGNOSIS — M79.601 PAIN, ARM, RIGHT: ICD-10-CM

## 2019-08-28 DIAGNOSIS — S40.011A CONTUSION OF MULTIPLE SITES OF SHOULDER, RIGHT, INITIAL ENCOUNTER: Primary | ICD-10-CM

## 2019-08-28 PROCEDURE — 97110 THERAPEUTIC EXERCISES: CPT | Performed by: PHYSICAL THERAPIST

## 2019-08-28 PROCEDURE — 97530 THERAPEUTIC ACTIVITIES: CPT | Performed by: PHYSICAL THERAPIST

## 2019-08-30 ENCOUNTER — TREATMENT (OUTPATIENT)
Dept: PHYSICAL THERAPY | Age: 59
End: 2019-08-30
Payer: COMMERCIAL

## 2019-08-30 DIAGNOSIS — S40.011A CONTUSION OF MULTIPLE SITES OF SHOULDER, RIGHT, INITIAL ENCOUNTER: Primary | ICD-10-CM

## 2019-08-30 DIAGNOSIS — M79.601 PAIN, ARM, RIGHT: ICD-10-CM

## 2019-08-30 PROCEDURE — 97530 THERAPEUTIC ACTIVITIES: CPT

## 2019-08-30 PROCEDURE — 97110 THERAPEUTIC EXERCISES: CPT

## 2019-08-30 NOTE — PROGRESS NOTES
Physical Therapy Daily Treatment Note    Date: 2019  Patient Name: Keith Pederson  : 1960   MRN: [id number]  DOInjury: 19  DOSx:    Referring Provider:  Cal Vernon DO     Medical Diagnosis:       Diagnosis Orders   1. Contusion of multiple sites of shoulder, right, initial encounter      2. Pain, arm, right         Outcome Measure: Outcome Measure:   QuickDASH (Disorders of the Arm, Shoulder, and Hand) 41% disability    S: Pt con't to need to use Ibuprofen every morning for pain relief. Pt is also noticing tingling down into R hand. O: Pt given written HEP  Time 5188-8736     Visit 14/ Repeat outcome measure at mid point and end. Pain 7/10     ROM AROM: 130° Forward elevation,  15° ER,  IR to R PSIS  PROM: 135° Forward elevation,  90° ER,  20° IR     Modalities            Manual                  Stretch      Table slides      Wall Flexion      Wall ER stretch      Towel IR stretch 30x HEP TE   IR reaching behind back      Exercise      Shrugs AROM      Pendulum Ex   TE   UBE      Pulleys - flex 4 min  TE   Pulleys-IR 3 min  TE   Supine wand chest press 2 x 20 Black TE   Supine wand flex 2 x 20 \" TE   Supine wand ER/IR 2 x 20 \" TE   Supine flexion 2# 2 x 20  TE   S-lying ABD      S-lying ER 2# 2 x 20  TE   Standing wand flex 2 x 20 Black  TE   Standing wand IR 2 x 20 \" TE   Standing ABD            ROWS: H Blue 2 x 20  TA   ROWS: M Blue 2 x 20  TA   ROWS: L      ER Red 2 x 20     IR Red 2 x 20     Shoulder shrugs 4# 2 x 20  TA   Shelf lift 4# 2 x 20  TA   Shoulder press 4# 2 x 20  TA         A:  Tolerated well. Above added to written HEP.   P: Continue with rehab plan  Armani Stinson PTA    Treatment Charges: Mins Units   Initial Evaluation     Re-Evaluation     Ther Exercise         TE 15 1   Manual Therapy     MT     Ther Activities        TA 15 1   Gait Training          GT     Neuro Re-education NR     Modalities     Non-Billable Service Time     Other     Total Time/Units 30 2

## 2019-09-04 ENCOUNTER — TREATMENT (OUTPATIENT)
Dept: PHYSICAL THERAPY | Age: 59
End: 2019-09-04
Payer: COMMERCIAL

## 2019-09-04 DIAGNOSIS — M79.601 PAIN, ARM, RIGHT: ICD-10-CM

## 2019-09-04 DIAGNOSIS — S40.011A CONTUSION OF MULTIPLE SITES OF SHOULDER, RIGHT, INITIAL ENCOUNTER: Primary | ICD-10-CM

## 2019-09-04 PROCEDURE — 97110 THERAPEUTIC EXERCISES: CPT | Performed by: PHYSICAL THERAPIST

## 2019-09-04 PROCEDURE — 97530 THERAPEUTIC ACTIVITIES: CPT | Performed by: PHYSICAL THERAPIST

## 2019-09-04 NOTE — PROGRESS NOTES
.trtPhysical Therapy Daily Treatment Note    Date: 2019  Patient Name: Sunny Farias  : 1960   MRN: [id number]  DOInjury: 19  DOSx:    Referring Provider:  Gian Jimenez DO     Medical Diagnosis:       Diagnosis Orders   1. Contusion of multiple sites of shoulder, right, initial encounter      2. Pain, arm, right         Outcome Measure: Outcome Measure:   QuickDASH (Disorders of the Arm, Shoulder, and Hand) 41% disability  19 QuickDASH 21% disability    S: Pt continues to report R shoulder and elbow pain which wakes him up at 3am and tingling to R hand  O: Pt given written HEP  Time 8586-3313     Visit 15/ Repeat outcome measure at mid point and end. Pain 5/10     ROM AROM: 130° Forward elevation,  15° ER,  IR to R PSIS  PROM: 135° Forward elevation,  90° ER,  20° IR     Modalities            Manual                  Stretch      Table slides      Wall Flexion      Wall ER stretch      Towel IR stretch 30x HEP TE   IR reaching behind back      Exercise      Shrugs AROM      Pendulum Ex   TE   UBE      Pulleys - flex 4 min  TE   Pulleys-IR 3 min  TE   Supine wand chest press 2 x 20 Black TE   Supine wand flex 2 x 20 \" TE   Supine wand ER/IR 2 x 20 \" TE   Supine flexion 2# 2 x 20  TE   S-lying ABD      S-lying ER 2# 2 x 20  TE   Standing wand flex 2 x 20 Black  TE   Standing wand IR 2 x 20 \" TE   Standing ABD            ROWS: H Blue 2 x 20  TA   ROWS: M Blue 2 x 20  TA   ROWS: L      ER Red 2 x 20     IR Red 2 x 20     Shoulder shrugs 4# 2 x 20  TA   Shelf lift 4# 2 x 20  TA   Shoulder press 4# 2 x 20  TA         A:  Tolerated well. Above added to written HEP. P: Continue with rehab plan.   Pt has follow up with physician on 19  Lisa Perea PTA    Treatment Charges: Mins Units   Initial Evaluation     Re-Evaluation     Ther Exercise         TE 25 2   Manual Therapy     MT     Ther Activities        TA 30 2   Gait Training          GT     Neuro Re-education NR     Modalities

## 2019-09-06 ENCOUNTER — TREATMENT (OUTPATIENT)
Dept: PHYSICAL THERAPY | Age: 59
End: 2019-09-06
Payer: COMMERCIAL

## 2019-09-06 DIAGNOSIS — S40.011A CONTUSION OF MULTIPLE SITES OF SHOULDER, RIGHT, INITIAL ENCOUNTER: Primary | ICD-10-CM

## 2019-09-06 DIAGNOSIS — M79.601 PAIN, ARM, RIGHT: ICD-10-CM

## 2019-09-06 PROCEDURE — 97110 THERAPEUTIC EXERCISES: CPT | Performed by: PHYSICAL THERAPIST

## 2019-09-06 PROCEDURE — 97530 THERAPEUTIC ACTIVITIES: CPT | Performed by: PHYSICAL THERAPIST

## 2019-09-06 NOTE — PROGRESS NOTES
.trtPhysical Therapy Daily Treatment Note    Date: 2019  Patient Name: Gabrielle Cook  : 1960   MRN: [id number]  DOInjury: 19  DOSx:    Referring Provider:  Antony Rojas DO     Medical Diagnosis:       Diagnosis Orders   1. Contusion of multiple sites of shoulder, right, initial encounter      2. Pain, arm, right         Outcome Measure: Outcome Measure:   QuickDASH (Disorders of the Arm, Shoulder, and Hand) 41% disability  19 QuickDASH 21% disability    S: Pt continues to report R shoulder and elbow pain which wakes him up at 3am and tingling to R hand  O: Pt given written HEP  Time 6112-2895     Visit 16/ Repeat outcome measure at mid point and end. Pain 5/10     ROM AROM: 130° Forward elevation,  15° ER,  IR to R PSIS  PROM: 135° Forward elevation,  90° ER,  20° IR     Modalities            Manual                  Stretch      Table slides      Wall Flexion      Wall ER stretch      Towel IR stretch 30x HEP TE   IR reaching behind back      Exercise      Shrugs AROM      Pendulum Ex   TE   UBE      Pulleys - flex 4 min  TE   Pulleys-IR 3 min  TE   Supine wand chest press 2 x 20 Black TE   Supine wand flex 2 x 20 \" TE   Supine wand ER/IR 2 x 20 \" TE   Supine flexion 2# 2 x 20  TE   S-lying ABD      S-lying ER 2# 2 x 20  TE   Standing wand flex 2 x 20 Black  TE   Standing wand IR 2 x 20 \" TE   Standing ABD            ROWS: H Blue 2 x 20  TA   ROWS: M Blue 2 x 20  TA   ROWS: L      ER Red 2 x 20     IR Red 2 x 20     Shoulder shrugs 4# 2 x 20  TA   Shelf lift 4# 2 x 20  TA   Shoulder press 4# 2 x 20  TA         A:  Tolerated well. Above added to written HEP. P: Continue with rehab plan.   Pt has follow up with physician on 19  Preeti Jose PTA    Treatment Charges: Mins Units   Initial Evaluation     Re-Evaluation     Ther Exercise         TE 30 2   Manual Therapy     MT     Ther Activities        TA 30 2   Gait Training          GT     Neuro Re-education NR     Modalities Non-Billable Service Time     Other     Total Time/Units 60 4

## 2019-09-09 ENCOUNTER — OFFICE VISIT (OUTPATIENT)
Dept: ORTHOPEDIC SURGERY | Age: 59
End: 2019-09-09
Payer: COMMERCIAL

## 2019-09-09 VITALS — BODY MASS INDEX: 26.16 KG/M2 | WEIGHT: 185 LBS

## 2019-09-09 DIAGNOSIS — M79.601 RIGHT ARM PAIN: Primary | ICD-10-CM

## 2019-09-09 PROCEDURE — 99213 OFFICE O/P EST LOW 20 MIN: CPT | Performed by: ORTHOPAEDIC SURGERY

## 2019-09-09 NOTE — PROGRESS NOTES
120 tablet 1    Multiple Vitamins-Minerals (THERAPEUTIC MULTIVITAMIN-MINERALS) tablet Take 1 tablet by mouth daily      phenytoin (DILANTIN) 100 MG ER capsule Take 1 capsule by mouth 3 times daily 270 capsule 1    vitamin D (CHOLECALCIFEROL) 1000 UNIT TABS tablet Take 1,000 Units by mouth daily       No current facility-administered medications for this visit.       Facility-Administered Medications Ordered in Other Visits   Medication Dose Route Frequency Provider Last Rate Last Dose    lactated ringers infusion   Intravenous Continuous Kelsey Archer MD           Patient Active Problem List   Diagnosis    [facility] disorder ([facility] [address]    Diastolic murmur    Anxiety disorder    Gastroesophageal reflux disease without esophagitis    Abnormal gait    Contusion of multiple sites of shoulder, right, initial encounter    Pain, arm, right       Past Medical History:   Diagnosis Date    Anxiety     Depression     Epilepsy ([facility] [address] [facility]     GERD (gastroesophageal reflux disease)     Knee pain     Laceration of spleen     Seizures ([facility] [address]     last seizure 1996  controlled with dilantin       Past Surgical History:   Procedure Laterality Date    ANKLE SURGERY  2005    right ankle has plates and screws    CHOLECYSTECTOMY  1999    COLONOSCOPY N/A 3/19/2019    COLONOSCOPY WITH BIOPSY performed by Bina William DO at [address] [place] [address] ARTHROSCOPY Left 06/30/2016    Arthroscopy left knee with synovectomy chrondroplasty lateral menisectomy and debridement tear anterior cruciate ligament    SPLENECTOMY, TOTAL  03/04/2015    Dr Ashish Martinez       No Known Allergies    Social History     Socioeconomic History    Marital status:      Spouse name: None    Number of children: 0    Years of education: 15    Highest education level: None   Occupational History    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

## 2019-09-11 ENCOUNTER — TREATMENT (OUTPATIENT)
Dept: PHYSICAL THERAPY | Age: 59
End: 2019-09-11
Payer: COMMERCIAL

## 2019-09-11 DIAGNOSIS — M79.601 PAIN, ARM, RIGHT: ICD-10-CM

## 2019-09-11 DIAGNOSIS — S40.011A CONTUSION OF MULTIPLE SITES OF SHOULDER, RIGHT, INITIAL ENCOUNTER: Primary | ICD-10-CM

## 2019-09-11 PROCEDURE — 97530 THERAPEUTIC ACTIVITIES: CPT

## 2019-09-11 PROCEDURE — 97110 THERAPEUTIC EXERCISES: CPT

## 2019-09-13 ENCOUNTER — TREATMENT (OUTPATIENT)
Dept: PHYSICAL THERAPY | Age: 59
End: 2019-09-13
Payer: COMMERCIAL

## 2019-09-13 DIAGNOSIS — S40.011A CONTUSION OF MULTIPLE SITES OF SHOULDER, RIGHT, INITIAL ENCOUNTER: Primary | ICD-10-CM

## 2019-09-13 DIAGNOSIS — M79.601 PAIN, ARM, RIGHT: ICD-10-CM

## 2019-09-13 PROCEDURE — 97110 THERAPEUTIC EXERCISES: CPT | Performed by: PHYSICAL THERAPIST

## 2019-09-13 PROCEDURE — 97530 THERAPEUTIC ACTIVITIES: CPT | Performed by: PHYSICAL THERAPIST

## 2019-10-12 ENCOUNTER — HOSPITAL ENCOUNTER (OUTPATIENT)
Dept: GENERAL RADIOLOGY | Age: 59
Discharge: HOME | End: 2019-10-14
Payer: COMMERCIAL

## 2019-10-12 ENCOUNTER — HOSPITAL ENCOUNTER (OUTPATIENT)
Age: 59
Discharge: HOME | End: 2019-10-14
Payer: COMMERCIAL

## 2019-10-12 DIAGNOSIS — M25.511 RIGHT SHOULDER PAIN, UNSPECIFIED CHRONICITY: ICD-10-CM

## 2019-10-12 PROCEDURE — 73060 X-RAY EXAM OF HUMERUS: CPT

## 2019-10-28 ENCOUNTER — OFFICE VISIT (OUTPATIENT)
Dept: ORTHOPEDIC SURGERY | Age: 59
End: 2019-10-28
Payer: COMMERCIAL

## 2019-10-28 VITALS — HEIGHT: 71 IN | BODY MASS INDEX: 26.6 KG/M2 | WEIGHT: 190.04 LBS

## 2019-10-28 DIAGNOSIS — M79.601 RIGHT ARM PAIN: Primary | ICD-10-CM

## 2019-10-28 DIAGNOSIS — M75.41 IMPINGEMENT SYNDROME OF SHOULDER, RIGHT: ICD-10-CM

## 2019-10-28 DIAGNOSIS — S42.131G: ICD-10-CM

## 2019-10-28 DIAGNOSIS — M19.011 ARTHRITIS OF RIGHT ACROMIOCLAVICULAR JOINT: ICD-10-CM

## 2019-10-28 PROCEDURE — 99213 OFFICE O/P EST LOW 20 MIN: CPT | Performed by: ORTHOPAEDIC SURGERY

## 2019-12-23 ENCOUNTER — HOSPITAL ENCOUNTER (OUTPATIENT)
Age: 59
Discharge: HOME | End: 2019-12-23
Payer: COMMERCIAL

## 2019-12-23 DIAGNOSIS — R73.09 OTHER ABNORMAL GLUCOSE: ICD-10-CM

## 2019-12-23 DIAGNOSIS — R53.83 FATIGUE, UNSPECIFIED TYPE: ICD-10-CM

## 2019-12-23 LAB
ALBUMIN SERPL-MCNC: 4.2 G/DL (ref 3.5–5.2)
ALP BLD-CCNC: 152 U/L (ref 40–129)
ALT SERPL-CCNC: 15 U/L (ref 0–40)
ANION GAP SERPL CALCULATED.3IONS-SCNC: 12 MMOL/L (ref 7–16)
AST SERPL-CCNC: 22 U/L (ref 0–39)
BASOPHILS ABSOLUTE: 0.02 E9/L (ref 0–0.2)
BASOPHILS RELATIVE PERCENT: 0.6 % (ref 0–2)
BILIRUB SERPL-MCNC: <0.2 MG/DL (ref 0–1.2)
BUN BLDV-MCNC: 16 MG/DL (ref 8–23)
CALCIUM SERPL-MCNC: 9.2 MG/DL (ref 8.6–10.2)
CHLORIDE BLD-SCNC: 101 MMOL/L (ref 98–107)
CO2: 27 MMOL/L (ref 22–29)
CREAT SERPL-MCNC: 0.8 MG/DL (ref 0.7–1.2)
EOSINOPHILS ABSOLUTE: 0.05 E9/L (ref 0.05–0.5)
EOSINOPHILS RELATIVE PERCENT: 1.5 % (ref 0–6)
GFR AFRICAN AMERICAN: >60
GFR NON-AFRICAN AMERICAN: >60 ML/MIN/1.73
GLUCOSE BLD-MCNC: 127 MG/DL (ref 74–99)
HBA1C MFR BLD: 4.9 % (ref 4–5.6)
HCT VFR BLD CALC: 44.6 % (ref 37–54)
HEMOGLOBIN: 15.4 G/DL (ref 12.5–16.5)
IMMATURE GRANULOCYTES #: 0.01 E9/L
IMMATURE GRANULOCYTES %: 0.3 % (ref 0–5)
LYMPHOCYTES ABSOLUTE: 1.1 E9/L (ref 1.5–4)
LYMPHOCYTES RELATIVE PERCENT: 33.5 % (ref 20–42)
MCH RBC QN AUTO: 32.4 PG (ref 26–35)
MCHC RBC AUTO-ENTMCNC: 34.5 % (ref 32–34.5)
MCV RBC AUTO: 93.9 FL (ref 80–99.9)
MONOCYTES ABSOLUTE: 0.4 E9/L (ref 0.1–0.95)
MONOCYTES RELATIVE PERCENT: 12.2 % (ref 2–12)
NEUTROPHILS ABSOLUTE: 1.7 E9/L (ref 1.8–7.3)
NEUTROPHILS RELATIVE PERCENT: 51.9 % (ref 43–80)
PDW BLD-RTO: 13.8 FL (ref 11.5–15)
PLATELET # BLD: 295 E9/L (ref 130–450)
PMV BLD AUTO: 11.3 FL (ref 7–12)
POTASSIUM SERPL-SCNC: 4.3 MMOL/L (ref 3.5–5)
RBC # BLD: 4.75 E12/L (ref 3.8–5.8)
SODIUM BLD-SCNC: 140 MMOL/L (ref 132–146)
TOTAL PROTEIN: 7.2 G/DL (ref 6.4–8.3)
WBC # BLD: 3.3 E9/L (ref 4.5–11.5)

## 2019-12-23 PROCEDURE — 85025 COMPLETE CBC W/AUTO DIFF WBC: CPT

## 2019-12-23 PROCEDURE — 83036 HEMOGLOBIN GLYCOSYLATED A1C: CPT

## 2019-12-23 PROCEDURE — 36415 COLL VENOUS BLD VENIPUNCTURE: CPT

## 2019-12-23 PROCEDURE — 80053 COMPREHEN METABOLIC PANEL: CPT

## 2020-06-11 ENCOUNTER — HOSPITAL ENCOUNTER (OUTPATIENT)
Age: 60
Discharge: HOME | End: 2020-06-13
Payer: COMMERCIAL

## 2020-06-11 LAB
ALBUMIN SERPL-MCNC: 4.3 G/DL (ref 3.5–5.2)
ALP BLD-CCNC: 82 U/L (ref 40–129)
ALT SERPL-CCNC: 10 U/L (ref 0–40)
ANION GAP SERPL CALCULATED.3IONS-SCNC: 11 MMOL/L (ref 7–16)
AST SERPL-CCNC: 17 U/L (ref 0–39)
BASOPHILS ABSOLUTE: 0.03 E9/L (ref 0–0.2)
BASOPHILS RELATIVE PERCENT: 0.9 % (ref 0–2)
BILIRUB SERPL-MCNC: 0.3 MG/DL (ref 0–1.2)
BILIRUBIN URINE: NEGATIVE
BLOOD, URINE: NEGATIVE
BUN BLDV-MCNC: 12 MG/DL (ref 8–23)
CALCIUM SERPL-MCNC: 9.4 MG/DL (ref 8.6–10.2)
CHLORIDE BLD-SCNC: 102 MMOL/L (ref 98–107)
CHOLESTEROL, TOTAL: 228 MG/DL (ref 0–199)
CLARITY: CLEAR
CO2: 28 MMOL/L (ref 22–29)
COLOR: YELLOW
CREAT SERPL-MCNC: 0.8 MG/DL (ref 0.7–1.2)
CREATININE URINE: 186 MG/DL (ref 40–278)
EOSINOPHILS ABSOLUTE: 0.04 E9/L (ref 0.05–0.5)
EOSINOPHILS RELATIVE PERCENT: 1.2 % (ref 0–6)
GFR AFRICAN AMERICAN: >60
GFR NON-AFRICAN AMERICAN: >60 ML/MIN/1.73
GLUCOSE BLD-MCNC: 91 MG/DL (ref 74–99)
GLUCOSE URINE: NEGATIVE MG/DL
HBA1C MFR BLD: 5.3 % (ref 4–5.6)
HCT VFR BLD CALC: 44.4 % (ref 37–54)
HDLC SERPL-MCNC: 72 MG/DL
HEMOGLOBIN: 14.9 G/DL (ref 12.5–16.5)
IMMATURE GRANULOCYTES #: 0 E9/L
IMMATURE GRANULOCYTES %: 0 % (ref 0–5)
KETONES, URINE: NEGATIVE MG/DL
LDL CHOLESTEROL CALCULATED: 139 MG/DL (ref 0–99)
LEUKOCYTE ESTERASE, URINE: NEGATIVE
LYMPHOCYTES ABSOLUTE: 1.17 E9/L (ref 1.5–4)
LYMPHOCYTES RELATIVE PERCENT: 34.8 % (ref 20–42)
MAGNESIUM: 2.1 MG/DL (ref 1.6–2.6)
MCH RBC QN AUTO: 31.7 PG (ref 26–35)
MCHC RBC AUTO-ENTMCNC: 33.6 % (ref 32–34.5)
MCV RBC AUTO: 94.5 FL (ref 80–99.9)
MICROALBUMIN UR-MCNC: 19.3 MG/L
MICROALBUMIN/CREAT UR-RTO: 10.4 (ref 0–30)
MONOCYTES ABSOLUTE: 0.42 E9/L (ref 0.1–0.95)
MONOCYTES RELATIVE PERCENT: 12.5 % (ref 2–12)
NEUTROPHILS ABSOLUTE: 1.7 E9/L (ref 1.8–7.3)
NEUTROPHILS RELATIVE PERCENT: 50.6 % (ref 43–80)
NITRITE, URINE: NEGATIVE
PDW BLD-RTO: 14.6 FL (ref 11.5–15)
PH UA: 7 (ref 5–9)
PHOSPHORUS: 3.1 MG/DL (ref 2.5–4.5)
PLATELET # BLD: 275 E9/L (ref 130–450)
PMV BLD AUTO: 12.1 FL (ref 7–12)
POTASSIUM SERPL-SCNC: 4.2 MMOL/L (ref 3.5–5)
PROSTATE SPECIFIC ANTIGEN: 2.55 NG/ML (ref 0–4)
PROTEIN UA: NEGATIVE MG/DL
RBC # BLD: 4.7 E12/L (ref 3.8–5.8)
SODIUM BLD-SCNC: 141 MMOL/L (ref 132–146)
SPECIFIC GRAVITY UA: 1.02 (ref 1–1.03)
T4 FREE: 0.99 NG/DL (ref 0.93–1.7)
TOTAL PROTEIN: 7.1 G/DL (ref 6.4–8.3)
TRIGL SERPL-MCNC: 84 MG/DL (ref 0–149)
TSH SERPL DL<=0.05 MIU/L-ACNC: 1.42 UIU/ML (ref 0.27–4.2)
UROBILINOGEN, URINE: 0.2 E.U./DL
VITAMIN D 25-HYDROXY: 43 NG/ML (ref 30–100)
VLDLC SERPL CALC-MCNC: 17 MG/DL
WBC # BLD: 3.4 E9/L (ref 4.5–11.5)

## 2020-06-11 PROCEDURE — 80185 ASSAY OF PHENYTOIN TOTAL: CPT

## 2020-06-11 PROCEDURE — 81003 URINALYSIS AUTO W/O SCOPE: CPT

## 2020-06-11 PROCEDURE — 80061 LIPID PANEL: CPT

## 2020-06-11 PROCEDURE — 85025 COMPLETE CBC W/AUTO DIFF WBC: CPT

## 2020-06-11 PROCEDURE — 80053 COMPREHEN METABOLIC PANEL: CPT

## 2020-06-11 PROCEDURE — G0103 PSA SCREENING: HCPCS

## 2020-06-11 PROCEDURE — 82044 UR ALBUMIN SEMIQUANTITATIVE: CPT

## 2020-06-11 PROCEDURE — 84443 ASSAY THYROID STIM HORMONE: CPT

## 2020-06-11 PROCEDURE — 84439 ASSAY OF FREE THYROXINE: CPT

## 2020-06-11 PROCEDURE — 82570 ASSAY OF URINE CREATININE: CPT

## 2020-06-11 PROCEDURE — 83036 HEMOGLOBIN GLYCOSYLATED A1C: CPT

## 2020-06-11 PROCEDURE — 82306 VITAMIN D 25 HYDROXY: CPT

## 2020-06-11 PROCEDURE — 84100 ASSAY OF PHOSPHORUS: CPT

## 2020-06-11 PROCEDURE — 83735 ASSAY OF MAGNESIUM: CPT

## 2020-06-12 LAB
PHENYTOIN DOSE AMOUNT: ABNORMAL
PHENYTOIN LEVEL: 21.9 UG/ML (ref 10–20)

## 2020-07-13 ENCOUNTER — TELEPHONE (OUTPATIENT)
Dept: SLEEP CENTER | Age: 60
End: 2020-07-13

## 2020-07-15 ENCOUNTER — TELEPHONE (OUTPATIENT)
Dept: SLEEP CENTER | Age: 60
End: 2020-07-15

## 2020-07-24 ENCOUNTER — HOSPITAL ENCOUNTER (OUTPATIENT)
Age: 60
Discharge: HOME | End: 2020-07-26
Payer: COMMERCIAL

## 2020-07-24 PROCEDURE — U0003 INFECTIOUS AGENT DETECTION BY NUCLEIC ACID (DNA OR RNA); SEVERE ACUTE RESPIRATORY SYNDROME CORONAVIRUS 2 (SARS-COV-2) (CORONAVIRUS DISEASE [COVID-19]), AMPLIFIED PROBE TECHNIQUE, MAKING USE OF HIGH THROUGHPUT TECHNOLOGIES AS DESCRIBED BY CMS-2020-01-R: HCPCS

## 2020-07-27 ENCOUNTER — TELEPHONE (OUTPATIENT)
Dept: SLEEP CENTER | Age: 60
End: 2020-07-27

## 2020-07-27 LAB
SARS-COV-2: NOT DETECTED
SOURCE: NORMAL

## 2020-07-28 ENCOUNTER — HOSPITAL ENCOUNTER (OUTPATIENT)
Dept: SLEEP CENTER | Age: 60
Discharge: HOME | End: 2020-07-28
Payer: COMMERCIAL

## 2020-07-28 PROCEDURE — 95811 POLYSOM 6/>YRS CPAP 4/> PARM: CPT

## 2020-07-28 PROCEDURE — 95811 POLYSOM 6/>YRS CPAP 4/> PARM: CPT | Performed by: INTERNAL MEDICINE

## 2020-08-04 ENCOUNTER — TELEPHONE (OUTPATIENT)
Dept: SLEEP MEDICINE | Age: 60
End: 2020-08-04

## 2020-08-04 PROBLEM — G47.33 OBSTRUCTIVE SLEEP APNEA: Status: ACTIVE | Noted: 2020-08-04

## 2020-08-04 NOTE — PROGRESS NOTES
a  baseline/diagnostic recording time of 122 minutes that included 67  minutes of sleep. FINDINGS:  SLEEP CONTINUITY AND SLEEP ARCHITECTURE:  Lights were turned off at  10:09 p.m. and lights were turned on at 5:40 a.m. Total recording time  was 440 minutes and total sleep time was 268 minutes. Sleep onset  latency was normal at 7 minutes and REM latency was increased at 273  minutes. The amount of N1 sleep was 9% of total sleep time or 25  minutes. The amount of N2 sleep was 63% of total sleep time or 170  minutes. Slow-wave sleep was 18% of total sleep time or 49 minutes. REM sleep was 10% of total sleep time or 27 minutes. The pretreatment  arousal index was significantly increased to 66; arousals were related  to respiratory events. The pretreatment sleep efficiency was 54% and  sleep efficiency after initiating positive airway pressure therapy was  64%. Sleep architecture was similar with the use of positive airway pressure  therapy, as compared to the pretreatment baseline recording, other than  the emergence of REM sleep. RESPIRATORY MONITORING:  The pretreatment portion of the study  documented 55 obstructive apneas, 8 central apneas, 6 mixed apneas, 7  hypopneas, and 6 respiratory effort related arousals (RERAs) over the 67  minutes of recorded sleep. The pretreatment apnea-hypopnea index (AHI)  was 68. The pretreatment respiratory disturbance index (RDI includes  RERAs) was 73.4. There was no REM sleep during the diagnostic portion  of the study. The patient slept in the supine position. The  pretreatment 4% oxygen desaturation index during sleep was 61. The  lowest oxygen saturation during baseline sleep was 83%. Oxygen  saturations were less than or equal to 88% for 6 minutes or 9% of the  pretreatment portion of the study. Light-to-mild snoring was noted. EEG:  No abnormal epileptiform activity was observed. ECG:  The electrocardiogram documented normal sinus rhythm.   The average  heart rate during sleep was 71 beats per minute in the diagnostic  portion of study and 61 beats per minute in the treatment portion of the  study. PERIODIC LIMB MOVEMENTS:  No significant periodic limb movements were  noted. EMG/VIDEO MONITORING:  Loss of REM atonia, bruxism and parasomnias were  not observed. TREATMENT:  After the diagnostic portion of the study, CPAP was  initiated at 7 cm of water and titrated to 9 cm of water in order to  abolish respiratory events. Respiratory events were virtually  eliminated with the use of positive airway pressure therapy, although  titration was limited by relative paucity of supine sleep, and the lack  of supine-REM sleep. The patient also noted significant anxiety with  CPAP therapy and experienced several bouts of awakening while on CPAP  therapy. Titration at the final CPAP of 9 cm of water resulted in AHI  of 2, a minimum oxyhemoglobin saturation of 89%, and average  oxyhemoglobin saturation of 94%. Time spent on CPAP 9 cm of water  included non-supine REM sleep. Of the 317 minutes of positive airway  pressure titration time, the patient was awake for 116 minutes. IMPRESSION:  1. This study is consistent with severe obstructive sleep apnea. The  severity of the patient's sleep-disordered breathing was likely  underestimated due to the absence of REM sleep in the diagnostic portion  of study. 2.  A split-night study was performed, and the patient's  sleep-disordered breathing was successfully treated with CPAP therapy. 3.  Titration was somewhat limited by paucity of supine sleep, relative   to the diagnostic portion of study. 4.  Subjectively, the patient did not complete the post study  questionnaire but reported during the study that he felt anxious about  the prospects of using CPAP therapy at home. RECOMMENDATIONS:  1. Given the severity of this patient's sleep disordered breathing,  CPAP therapy is strongly recommended.   Auto CPAP therapy at settings of  5 to 10 cm of water will be ordered by the interpreting provider. If the  patient desires, he may be seen in the [address] [place] [address]  before receiving PAP therapy in order to further discuss the rationale  and benefits of CPAP therapy. 2.  Per insurance requirements, the patient will be seen in clinical  followup within three months of receiving CPAP therapy in order to  document adherence to therapy, assess efficacy of the prescribed  settings and evaluate resolution of sleep-related complaints. 3.  The patient may be a candidate for alternative therapy options,  namely hypoglossal nerve stimulation, if he demonstrates failure with  CPAP therapy first.  This will be further discussed with him in the  [address] [place] [address]. 4.  The patient should be counseled against driving while drowsy. 5.  The patient should be aware that weight gain may worsen the severity  of obstructive sleep apnea. EQUIPMENT ORDERING INFORMATION:  DEVICE:  Auto CPAP with heated humidification and a remote modem. SETTINGS:  5 to 10 cm of water with EPR 3. MASK TYPE:  [employer] Simplus Full-Face mask. MASK SIZE:  Medium. SUPPLEMENTAL OXYGEN:  None.         Elzbieta Martinez MD    D: 08/03/2020 21:11:15       T: 08/03/2020 23:58:33     [email or web address]  Job#: [id number]     Doc#: [id number]    CC:

## 2020-08-04 NOTE — Clinical Note
Ulysses Clifton,    Can you please:    1) contact the patient with his results (severe KARINA) and let him know we recommend auto-CPAP 5-10 cm of water? 2) send the order for PAP plus all necessary documents to his preferred DME? Unsure which DMEs accept [employer] - he may need to investigate. 3) schedule follow up with Rubi for about 3 months from now? If he is extremely anxious about CPAP therapy, you can offer him a slot to see her first, since compliance will be krishnan, but I know her schedule is tight -- and because he is severe, I wouldn't want him to delay treatment for too long. Thank you!   Mely

## 2020-08-04 NOTE — PROGRESS NOTES
Patient's split-night PSG interpreted, consistent with severe KARINA. Auto-CPAP therapy 5-10 cm of water ordered. Patient will be notified of results and order will be sent to preferred DME. He will be reminded of insurance requirements for compliance and 30-day window to exchange mask interface. He will follow up in clinic within 3 months with KIM Bales.     Saturnino Collado MD

## 2020-08-05 ENCOUNTER — TELEPHONE (OUTPATIENT)
Dept: SLEEP MEDICINE | Age: 60
End: 2020-08-05

## 2020-08-17 ENCOUNTER — TELEPHONE (OUTPATIENT)
Dept: SLEEP MEDICINE | Age: 60
End: 2020-08-17

## 2020-08-17 NOTE — TELEPHONE ENCOUNTER
Received a VM from Camila Stone with [employer] requesting verification of pt insurance and date of injury --ret her call and LM with call back number for her to call to discuss information needed

## 2020-08-18 NOTE — TELEPHONE ENCOUNTER
Received ret call from Princess Jeong and answered her questions as best I could --I do not have pt employment information that she requested nor do I have  info--I did tell her that pt called with fax number that info needed to be sent to and to call pt to see who he spoke with at insurance co

## 2020-08-22 ENCOUNTER — HOSPITAL ENCOUNTER (EMERGENCY)
Age: 60
Discharge: HOME | End: 2020-08-22
Attending: EMERGENCY MEDICINE
Payer: COMMERCIAL

## 2020-08-22 VITALS
OXYGEN SATURATION: 96 % | DIASTOLIC BLOOD PRESSURE: 81 MMHG | RESPIRATION RATE: 16 BRPM | WEIGHT: 185 LBS | BODY MASS INDEX: 25.9 KG/M2 | TEMPERATURE: 97.5 F | HEART RATE: 84 BPM | SYSTOLIC BLOOD PRESSURE: 145 MMHG | HEIGHT: 71 IN

## 2020-08-22 PROCEDURE — 99282 EMERGENCY DEPT VISIT SF MDM: CPT

## 2020-08-22 RX ORDER — TRAMADOL HYDROCHLORIDE 50 MG/1
50 TABLET ORAL EVERY 4 HOURS PRN
Qty: 18 TABLET | Refills: 0 | Status: SHIPPED | OUTPATIENT
Start: 2020-08-22 | End: 2020-08-25

## 2020-08-22 ASSESSMENT — PAIN DESCRIPTION - PAIN TYPE: TYPE: CHRONIC PAIN

## 2020-08-22 ASSESSMENT — PAIN DESCRIPTION - ORIENTATION: ORIENTATION: RIGHT

## 2020-08-22 ASSESSMENT — PAIN SCALES - GENERAL: PAINLEVEL_OUTOF10: 5

## 2020-08-22 ASSESSMENT — PAIN DESCRIPTION - LOCATION: LOCATION: SHOULDER

## 2020-08-22 NOTE — ED PROVIDER NOTES
Vitamins-Minerals (THERAPEUTIC MULTIVITAMIN-MINERALS) tablet Take 1 tablet by mouth daily      vitamin D (CHOLECALCIFEROL) 1000 UNIT TABS tablet Take 1,000 Units by mouth daily       Facility-Administered Medications Ordered in Other Encounters   Medication Dose Route Frequency Provider Last Rate Last Dose    lactated ringers infusion   Intravenous Continuous Edie Guzman MD         No Known Allergies  Social History     Socioeconomic History    Marital status:      Spouse name: Not on file    Number of children: 0    Years of education: 15    Highest education level: Not on file   Occupational History    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[employer]   Social Needs    Financial resource strain: Not very hard    Food insecurity     Worry: Never true     Inability: Never true    Transportation needs     Medical: No     Non-medical: No   Tobacco Use    Smoking status: Former Smoker     Years: 5.00    Smokeless tobacco: Former User     Quit date: 1990   Substance and Sexual Activity    Alcohol use: Yes     Frequency: Monthly or less     Drinks per session: 1 or 2     Binge frequency: Never     Comment: occasional    Drug use: No    Sexual activity: Not Currently     Partners: Female   Lifestyle    Physical activity     Days per week: Not on file     Minutes per session: Not on file    Stress:  To some extent   Relationships    Social connections     Talks on phone: More than three times a week     Gets together: More than three times a week     Attends Zoroastrian service: More than 4 times per year     Active member of club or organization: No     Attends meetings of clubs or organizations: Never     Relationship status:     Intimate partner violence     Fear of current or ex partner: No     Emotionally abused: No     Physically abused: No     Forced sexual activity: No   Other Topics Concern    Not on file   Social History Narrative    Not on file     Review of Systems  Pertinent Cancer in his mother; Heart Disease (age of onset: 66) in his father. The patients home medications have been reviewed. Allergies: Patient has no known allergies. -------------------------------------------------- RESULTS -------------------------------------------------  Labs:  No results found for this visit on 08/22/20. Radiology:  No orders to display       ------------------------- NURSING NOTES AND VITALS REVIEWED ---------------------------  Date / Time Roomed:  8/22/2020 11:02 AM  ED Bed Assignment:  [id number]    The nursing notes within the ED encounter and vital signs as below have been reviewed. BP (!) 145/81   Pulse 84   Temp 97.5 °F (36.4 °C) (Infrared)   Resp 16   Ht 5' 10.5\" (1.791 m)   Wt 185 lb (83.9 kg)   SpO2 96%   BMI 26.17 kg/m²   Oxygen Saturation Interpretation: Normal      ------------------------------------------ PROGRESS NOTES ------------------------------------------  I have spoken with the patient and discussed todays results, in addition to providing specific details for the plan of care and counseling regarding the diagnosis and prognosis. Their questions are answered at this time and they are agreeable with the plan. I discussed at length with them reasons for immediate return here for re evaluation. They will followup with primary care by calling their office tomorrow. --------------------------------- ADDITIONAL PROVIDER NOTES ---------------------------------  At this time the patient is without objective evidence of an acute process requiring hospitalization or inpatient management. They have remained hemodynamically stable throughout their entire ED visit and are stable for discharge with outpatient follow-up. The plan has been discussed in detail and they are aware of the specific conditions for emergent return, as well as the importance of follow-up.       New Prescriptions    TRAMADOL (ULTRAM) 50 MG TABLET    Take 1 tablet by mouth every 4 hours as needed for Pain for up to 3 days. Intended supply: 3 days. Take lowest dose possible to manage pain       Diagnosis:  1. Chronic right shoulder pain Stable       Disposition:  Patient's disposition: Discharge to home  Patient's condition is stable.          Terrence Laws MD  08/27/20 9626

## 2020-10-12 RX ORDER — SODIUM CHLORIDE, SODIUM LACTATE, POTASSIUM CHLORIDE, CALCIUM CHLORIDE 600; 310; 30; 20 MG/100ML; MG/100ML; MG/100ML; MG/100ML
INJECTION, SOLUTION INTRAVENOUS CONTINUOUS
Status: CANCELLED | OUTPATIENT
Start: 2020-10-12

## 2020-10-12 RX ORDER — SODIUM CHLORIDE 0.9 % (FLUSH) 0.9 %
10 SYRINGE (ML) INJECTION EVERY 12 HOURS SCHEDULED
Status: CANCELLED | OUTPATIENT
Start: 2020-10-12

## 2020-10-12 RX ORDER — SODIUM CHLORIDE 0.9 % (FLUSH) 0.9 %
10 SYRINGE (ML) INJECTION PRN
Status: CANCELLED | OUTPATIENT
Start: 2020-10-12

## 2020-10-13 ENCOUNTER — HOSPITAL ENCOUNTER (OUTPATIENT)
Dept: PREADMISSION TESTING | Age: 60
Discharge: HOME | End: 2020-10-13
Payer: COMMERCIAL

## 2020-10-13 VITALS
HEIGHT: 71 IN | OXYGEN SATURATION: 97 % | HEART RATE: 71 BPM | WEIGHT: 186.5 LBS | TEMPERATURE: 97.6 F | SYSTOLIC BLOOD PRESSURE: 149 MMHG | BODY MASS INDEX: 26.11 KG/M2 | DIASTOLIC BLOOD PRESSURE: 65 MMHG | RESPIRATION RATE: 18 BRPM

## 2020-10-13 LAB
ALBUMIN SERPL-MCNC: 4.2 G/DL (ref 3.5–5.2)
ALP BLD-CCNC: 111 U/L (ref 40–129)
ALT SERPL-CCNC: 14 U/L (ref 0–40)
ANION GAP SERPL CALCULATED.3IONS-SCNC: 8 MMOL/L (ref 7–16)
AST SERPL-CCNC: 17 U/L (ref 0–39)
BILIRUB SERPL-MCNC: 0.2 MG/DL (ref 0–1.2)
BUN BLDV-MCNC: 12 MG/DL (ref 8–23)
CALCIUM SERPL-MCNC: 9.2 MG/DL (ref 8.6–10.2)
CHLORIDE BLD-SCNC: 103 MMOL/L (ref 98–107)
CO2: 29 MMOL/L (ref 22–29)
CREAT SERPL-MCNC: 0.7 MG/DL (ref 0.7–1.2)
EKG ATRIAL RATE: 56 BPM
EKG P AXIS: 86 DEGREES
EKG P-R INTERVAL: 158 MS
EKG Q-T INTERVAL: 424 MS
EKG QRS DURATION: 82 MS
EKG QTC CALCULATION (BAZETT): 409 MS
EKG R AXIS: 10 DEGREES
EKG T AXIS: 50 DEGREES
EKG VENTRICULAR RATE: 56 BPM
GFR AFRICAN AMERICAN: >60
GFR NON-AFRICAN AMERICAN: >60 ML/MIN/1.73
GLUCOSE BLD-MCNC: 80 MG/DL (ref 74–99)
HCT VFR BLD CALC: 41.8 % (ref 37–54)
HEMOGLOBIN: 14 G/DL (ref 12.5–16.5)
MCH RBC QN AUTO: 31.9 PG (ref 26–35)
MCHC RBC AUTO-ENTMCNC: 33.5 % (ref 32–34.5)
MCV RBC AUTO: 95.2 FL (ref 80–99.9)
PDW BLD-RTO: 14 FL (ref 11.5–15)
PLATELET # BLD: 248 E9/L (ref 130–450)
PMV BLD AUTO: 11.4 FL (ref 7–12)
POTASSIUM REFLEX MAGNESIUM: 4 MMOL/L (ref 3.5–5)
RBC # BLD: 4.39 E12/L (ref 3.8–5.8)
SODIUM BLD-SCNC: 140 MMOL/L (ref 132–146)
TOTAL PROTEIN: 6.9 G/DL (ref 6.4–8.3)
WBC # BLD: 3.7 E9/L (ref 4.5–11.5)

## 2020-10-13 PROCEDURE — 93010 ELECTROCARDIOGRAM REPORT: CPT | Performed by: INTERNAL MEDICINE

## 2020-10-13 PROCEDURE — 36415 COLL VENOUS BLD VENIPUNCTURE: CPT

## 2020-10-13 PROCEDURE — 93005 ELECTROCARDIOGRAM TRACING: CPT | Performed by: ANESTHESIOLOGY

## 2020-10-13 PROCEDURE — 85027 COMPLETE CBC AUTOMATED: CPT

## 2020-10-13 PROCEDURE — 80053 COMPREHEN METABOLIC PANEL: CPT

## 2020-10-13 ASSESSMENT — PAIN - FUNCTIONAL ASSESSMENT: PAIN_FUNCTIONAL_ASSESSMENT: PREVENTS OR INTERFERES WITH MANY ACTIVE NOT PASSIVE ACTIVITIES

## 2020-10-13 ASSESSMENT — PAIN DESCRIPTION - ORIENTATION: ORIENTATION: RIGHT

## 2020-10-13 ASSESSMENT — PAIN DESCRIPTION - ONSET: ONSET: ON-GOING

## 2020-10-13 ASSESSMENT — PAIN DESCRIPTION - PROGRESSION: CLINICAL_PROGRESSION: NOT CHANGED

## 2020-10-13 ASSESSMENT — PAIN SCALES - GENERAL: PAINLEVEL_OUTOF10: 7

## 2020-10-13 ASSESSMENT — PAIN DESCRIPTION - FREQUENCY: FREQUENCY: CONTINUOUS

## 2020-10-13 ASSESSMENT — PAIN DESCRIPTION - LOCATION: LOCATION: SHOULDER

## 2020-10-13 ASSESSMENT — PAIN DESCRIPTION - DESCRIPTORS: DESCRIPTORS: ACHING

## 2020-10-13 ASSESSMENT — PAIN DESCRIPTION - PAIN TYPE: TYPE: CHRONIC PAIN

## 2020-10-13 NOTE — PROGRESS NOTES
[address] [facility]                                                                                                                    PRE OP INSTRUCTIONS FOR  Jemima Zamudio Loge        Date: 10/13/2020    Date of surgery: 10/16/2020   Arrival Time: Hospital will call you between 5pm and 7pm with your final arrival time for surgery    1. Do not eat or drink anything after midnight prior to surgery. This includes no water, chewing gum, mints or ice chips. 2. Take the following medications with a small sip of water on the morning of Surgery: dilantin, prilosec. 3. Diabetics may take evening dose of insulin but none after midnight. If you feel symptomatic or low blood sugar morning of surgery drink 1-2 ounces of apple juice only. 4. Aspirin, Ibuprofen, Advil, Naproxen, Vitamin E and other Anti-inflammatory products should be stopped  before surgery  as directed by your physician. Take Tylenol only unless instructed otherwise by your surgeon. 5. Check with your Doctor regarding stopping Plavix, Coumadin, Lovenox, Eliquis, Effient, or other blood thinners. 6. Do not smoke,use illicit drugs and do not drink any alcoholic beverages 24 hours prior to surgery. 7. You may brush your teeth the morning of surgery. DO NOT SWALLOW WATER    8. You MUST make arrangements for a responsible adult to take you home after your surgery. You will not be allowed to leave alone or drive yourself home. It is strongly suggested someone stay with you the first 24 hrs. Your surgery will be cancelled if you do not have a ride home. 9. PEDIATRIC PATIENTS ONLY:  A parent/legal guardian must accompany a child scheduled for surgery and plan to stay at the hospital until the child is discharged. Please do not bring other children with you.     10. Please wear simple, loose fitting clothing to the hospital.  [place] Deal not bring valuables (money, credit cards, checkbooks, etc.) Do not wear any makeup (including no eye makeup) or nail polish on your fingers or toes. 11. DO NOT wear any jewelry or piercings on day of surgery. All body piercing jewelry must be removed. 12. Shower the night before surgery with _x__Antibacterial soap /RIVKA WIPES________    13. TOTAL JOINT REPLACEMENT/HYSTERECTOMY PATIENTS ONLY---Remember to bring Blood Bank bracelet to the hospital on the day of surgery. 14. If you have a Living Will and Durable Power of  for Healthcare, please bring in a copy. 15. If appropriate bring crutches, inspirex, WALKER, CANE etc... 12. Notify your Surgeon if you develop any illness between now and surgery time, cough, cold, fever, sore throat, nausea, vomiting, etc.  Please notify your surgeon if you experience dizziness, shortness of breath or blurred vision between now & the time of your surgery. 17. If you have ___dentures, they will be removed before going to the OR; we will provide you a container. If you wear __x_contact lenses or _x__glasses, they will be removed; please bring a case for them. 18. To provide excellent care visitors will be limited to 2 in the room at any given time. 19. Please bring picture ID and insurance card. 20. Sleep apnea patients need to bring CPAP AND SETTINGS to hospital on day of surgery. 21. During flu season no children under the age of 15 are permitted in the hospital for the safety of all patients. 22. Other come in main lobby and stop at . Please call AMBULATORY CARE if you have any further questions.    [facility]     [address]

## 2020-10-14 ENCOUNTER — HOSPITAL ENCOUNTER (OUTPATIENT)
Age: 60
Discharge: HOME | End: 2020-10-16
Payer: COMMERCIAL

## 2020-10-14 PROCEDURE — U0003 INFECTIOUS AGENT DETECTION BY NUCLEIC ACID (DNA OR RNA); SEVERE ACUTE RESPIRATORY SYNDROME CORONAVIRUS 2 (SARS-COV-2) (CORONAVIRUS DISEASE [COVID-19]), AMPLIFIED PROBE TECHNIQUE, MAKING USE OF HIGH THROUGHPUT TECHNOLOGIES AS DESCRIBED BY CMS-2020-01-R: HCPCS

## 2020-10-15 ENCOUNTER — HOSPITAL ENCOUNTER (OUTPATIENT)
Age: 60
Setting detail: SPECIMEN
Discharge: HOME | End: 2020-10-15
Payer: COMMERCIAL

## 2020-10-15 LAB — SARS-COV-2, NAAT: NOT DETECTED

## 2020-10-15 PROCEDURE — U0002 COVID-19 LAB TEST NON-CDC: HCPCS

## 2020-10-16 ENCOUNTER — HOSPITAL ENCOUNTER (OUTPATIENT)
Age: 60
Setting detail: OUTPATIENT SURGERY
Discharge: HOME | End: 2020-10-16
Attending: ORTHOPAEDIC SURGERY | Admitting: ORTHOPAEDIC SURGERY
Payer: COMMERCIAL

## 2020-10-16 ENCOUNTER — HOSPITAL ENCOUNTER (EMERGENCY)
Age: 60
Discharge: HOME | End: 2020-10-17
Attending: EMERGENCY MEDICINE
Payer: COMMERCIAL

## 2020-10-16 ENCOUNTER — ANESTHESIA EVENT (OUTPATIENT)
Dept: OPERATING ROOM | Age: 60
End: 2020-10-16
Payer: COMMERCIAL

## 2020-10-16 ENCOUNTER — ANESTHESIA (OUTPATIENT)
Dept: OPERATING ROOM | Age: 60
End: 2020-10-16
Payer: COMMERCIAL

## 2020-10-16 VITALS
TEMPERATURE: 97.7 F | HEART RATE: 89 BPM | SYSTOLIC BLOOD PRESSURE: 135 MMHG | RESPIRATION RATE: 17 BRPM | DIASTOLIC BLOOD PRESSURE: 88 MMHG | OXYGEN SATURATION: 96 % | WEIGHT: 185 LBS | BODY MASS INDEX: 26.17 KG/M2

## 2020-10-16 VITALS
DIASTOLIC BLOOD PRESSURE: 76 MMHG | TEMPERATURE: 97.4 F | BODY MASS INDEX: 26.08 KG/M2 | RESPIRATION RATE: 16 BRPM | WEIGHT: 186.31 LBS | HEIGHT: 71 IN | HEART RATE: 70 BPM | OXYGEN SATURATION: 99 % | SYSTOLIC BLOOD PRESSURE: 124 MMHG

## 2020-10-16 VITALS
OXYGEN SATURATION: 100 % | DIASTOLIC BLOOD PRESSURE: 66 MMHG | SYSTOLIC BLOOD PRESSURE: 105 MMHG | RESPIRATION RATE: 2 BRPM

## 2020-10-16 LAB
ACANTHOCYTES: ABNORMAL
ALBUMIN SERPL-MCNC: 4.3 G/DL (ref 3.5–5.2)
ALP BLD-CCNC: 114 U/L (ref 40–129)
ALT SERPL-CCNC: 15 U/L (ref 0–40)
ANION GAP SERPL CALCULATED.3IONS-SCNC: 12 MMOL/L (ref 7–16)
AST SERPL-CCNC: 18 U/L (ref 0–39)
BASOPHILS ABSOLUTE: 0 E9/L (ref 0–0.2)
BASOPHILS RELATIVE PERCENT: 0.1 % (ref 0–2)
BILIRUB SERPL-MCNC: 0.3 MG/DL (ref 0–1.2)
BUN BLDV-MCNC: 18 MG/DL (ref 8–23)
BURR CELLS: ABNORMAL
CALCIUM SERPL-MCNC: 9 MG/DL (ref 8.6–10.2)
CHLORIDE BLD-SCNC: 101 MMOL/L (ref 98–107)
CO2: 27 MMOL/L (ref 22–29)
CREAT SERPL-MCNC: 0.7 MG/DL (ref 0.7–1.2)
EOSINOPHILS ABSOLUTE: 0 E9/L (ref 0.05–0.5)
EOSINOPHILS RELATIVE PERCENT: 0 % (ref 0–6)
GFR AFRICAN AMERICAN: >60
GFR NON-AFRICAN AMERICAN: >60 ML/MIN/1.73
GLUCOSE BLD-MCNC: 166 MG/DL (ref 74–99)
HCT VFR BLD CALC: 40.2 % (ref 37–54)
HEMOGLOBIN: 13.7 G/DL (ref 12.5–16.5)
LYMPHOCYTES ABSOLUTE: 0.56 E9/L (ref 1.5–4)
LYMPHOCYTES RELATIVE PERCENT: 3.5 % (ref 20–42)
MCH RBC QN AUTO: 32.5 PG (ref 26–35)
MCHC RBC AUTO-ENTMCNC: 34.1 % (ref 32–34.5)
MCV RBC AUTO: 95.5 FL (ref 80–99.9)
MONOCYTES ABSOLUTE: 0.14 E9/L (ref 0.1–0.95)
MONOCYTES RELATIVE PERCENT: 0.9 % (ref 2–12)
NEUTROPHILS ABSOLUTE: 13.54 E9/L (ref 1.8–7.3)
NEUTROPHILS RELATIVE PERCENT: 95.7 % (ref 43–80)
OVALOCYTES: ABNORMAL
PDW BLD-RTO: 14.3 FL (ref 11.5–15)
PLATELET # BLD: 226 E9/L (ref 130–450)
PMV BLD AUTO: 10.6 FL (ref 7–12)
POIKILOCYTES: ABNORMAL
POTASSIUM SERPL-SCNC: 3.8 MMOL/L (ref 3.5–5)
RBC # BLD: 4.21 E12/L (ref 3.8–5.8)
SARS-COV-2: NOT DETECTED
SODIUM BLD-SCNC: 140 MMOL/L (ref 132–146)
SOURCE: NORMAL
TARGET CELLS: ABNORMAL
TOTAL PROTEIN: 7 G/DL (ref 6.4–8.3)
WBC # BLD: 14.1 E9/L (ref 4.5–11.5)

## 2020-10-16 PROCEDURE — 2709999900 HC NON-CHARGEABLE SUPPLY: Performed by: ORTHOPAEDIC SURGERY

## 2020-10-16 PROCEDURE — 7100000001 HC PACU RECOVERY - ADDTL 15 MIN: Performed by: ORTHOPAEDIC SURGERY

## 2020-10-16 PROCEDURE — 6360000002 HC RX W HCPCS: Performed by: EMERGENCY MEDICINE

## 2020-10-16 PROCEDURE — 6360000002 HC RX W HCPCS: Performed by: ORTHOPAEDIC SURGERY

## 2020-10-16 PROCEDURE — 36415 COLL VENOUS BLD VENIPUNCTURE: CPT

## 2020-10-16 PROCEDURE — 7100000010 HC PHASE II RECOVERY - FIRST 15 MIN: Performed by: ORTHOPAEDIC SURGERY

## 2020-10-16 PROCEDURE — C1713 ANCHOR/SCREW BN/BN,TIS/BN: HCPCS | Performed by: ORTHOPAEDIC SURGERY

## 2020-10-16 PROCEDURE — 3600000013 HC SURGERY LEVEL 3 ADDTL 15MIN: Performed by: ORTHOPAEDIC SURGERY

## 2020-10-16 PROCEDURE — 7100000000 HC PACU RECOVERY - FIRST 15 MIN: Performed by: ORTHOPAEDIC SURGERY

## 2020-10-16 PROCEDURE — 96372 THER/PROPH/DIAG INJ SC/IM: CPT

## 2020-10-16 PROCEDURE — 6360000002 HC RX W HCPCS: Performed by: ANESTHESIOLOGY

## 2020-10-16 PROCEDURE — 3700000001 HC ADD 15 MINUTES (ANESTHESIA): Performed by: ORTHOPAEDIC SURGERY

## 2020-10-16 PROCEDURE — 99283 EMERGENCY DEPT VISIT LOW MDM: CPT

## 2020-10-16 PROCEDURE — 3700000000 HC ANESTHESIA ATTENDED CARE: Performed by: ORTHOPAEDIC SURGERY

## 2020-10-16 PROCEDURE — 2580000003 HC RX 258: Performed by: ANESTHESIOLOGY

## 2020-10-16 PROCEDURE — 3600000003 HC SURGERY LEVEL 3 BASE: Performed by: ORTHOPAEDIC SURGERY

## 2020-10-16 PROCEDURE — 6360000002 HC RX W HCPCS

## 2020-10-16 PROCEDURE — 2500000003 HC RX 250 WO HCPCS: Performed by: ORTHOPAEDIC SURGERY

## 2020-10-16 PROCEDURE — 7100000011 HC PHASE II RECOVERY - ADDTL 15 MIN: Performed by: ORTHOPAEDIC SURGERY

## 2020-10-16 PROCEDURE — 2580000003 HC RX 258: Performed by: EMERGENCY MEDICINE

## 2020-10-16 PROCEDURE — 2500000003 HC RX 250 WO HCPCS

## 2020-10-16 PROCEDURE — 80053 COMPREHEN METABOLIC PANEL: CPT

## 2020-10-16 PROCEDURE — 85025 COMPLETE CBC W/AUTO DIFF WBC: CPT

## 2020-10-16 DEVICE — ANCHOR SUTURE BIOCOMP 4.75X19.1 MM SWIVELOCK C: Type: IMPLANTABLE DEVICE | Site: SHOULDER | Status: FUNCTIONAL

## 2020-10-16 RX ORDER — LABETALOL HYDROCHLORIDE 5 MG/ML
5 INJECTION, SOLUTION INTRAVENOUS EVERY 10 MIN PRN
Status: DISCONTINUED | OUTPATIENT
Start: 2020-10-16 | End: 2020-10-16 | Stop reason: HOSPADM

## 2020-10-16 RX ORDER — PROPOFOL 10 MG/ML
INJECTION, EMULSION INTRAVENOUS PRN
Status: DISCONTINUED | OUTPATIENT
Start: 2020-10-16 | End: 2020-10-16 | Stop reason: SDUPTHER

## 2020-10-16 RX ORDER — SODIUM CHLORIDE 0.9 % (FLUSH) 0.9 %
10 SYRINGE (ML) INJECTION PRN
Status: DISCONTINUED | OUTPATIENT
Start: 2020-10-16 | End: 2020-10-16 | Stop reason: HOSPADM

## 2020-10-16 RX ORDER — DEXAMETHASONE SODIUM PHOSPHATE 10 MG/ML
INJECTION, SOLUTION INTRAMUSCULAR; INTRAVENOUS PRN
Status: DISCONTINUED | OUTPATIENT
Start: 2020-10-16 | End: 2020-10-16 | Stop reason: SDUPTHER

## 2020-10-16 RX ORDER — FENTANYL CITRATE 50 UG/ML
INJECTION, SOLUTION INTRAMUSCULAR; INTRAVENOUS PRN
Status: DISCONTINUED | OUTPATIENT
Start: 2020-10-16 | End: 2020-10-16 | Stop reason: SDUPTHER

## 2020-10-16 RX ORDER — METOCLOPRAMIDE HYDROCHLORIDE 5 MG/ML
10 INJECTION INTRAMUSCULAR; INTRAVENOUS ONCE
Status: COMPLETED | OUTPATIENT
Start: 2020-10-16 | End: 2020-10-16

## 2020-10-16 RX ORDER — PROMETHAZINE HYDROCHLORIDE 25 MG/ML
25 INJECTION, SOLUTION INTRAMUSCULAR; INTRAVENOUS PRN
Status: DISCONTINUED | OUTPATIENT
Start: 2020-10-16 | End: 2020-10-16 | Stop reason: HOSPADM

## 2020-10-16 RX ORDER — MIDAZOLAM HYDROCHLORIDE 1 MG/ML
INJECTION INTRAMUSCULAR; INTRAVENOUS PRN
Status: DISCONTINUED | OUTPATIENT
Start: 2020-10-16 | End: 2020-10-16 | Stop reason: SDUPTHER

## 2020-10-16 RX ORDER — BUPIVACAINE HYDROCHLORIDE 2.5 MG/ML
INJECTION, SOLUTION EPIDURAL; INFILTRATION; INTRACAUDAL PRN
Status: DISCONTINUED | OUTPATIENT
Start: 2020-10-16 | End: 2020-10-16 | Stop reason: ALTCHOICE

## 2020-10-16 RX ORDER — MEPERIDINE HYDROCHLORIDE 25 MG/ML
12.5 INJECTION INTRAMUSCULAR; INTRAVENOUS; SUBCUTANEOUS EVERY 5 MIN PRN
Status: DISCONTINUED | OUTPATIENT
Start: 2020-10-16 | End: 2020-10-16 | Stop reason: HOSPADM

## 2020-10-16 RX ORDER — GLYCOPYRROLATE 0.2 MG/ML
INJECTION INTRAMUSCULAR; INTRAVENOUS PRN
Status: DISCONTINUED | OUTPATIENT
Start: 2020-10-16 | End: 2020-10-16 | Stop reason: SDUPTHER

## 2020-10-16 RX ORDER — LIDOCAINE HYDROCHLORIDE 20 MG/ML
INJECTION, SOLUTION INFILTRATION; PERINEURAL PRN
Status: DISCONTINUED | OUTPATIENT
Start: 2020-10-16 | End: 2020-10-16 | Stop reason: SDUPTHER

## 2020-10-16 RX ORDER — SODIUM CHLORIDE, SODIUM LACTATE, POTASSIUM CHLORIDE, CALCIUM CHLORIDE 600; 310; 30; 20 MG/100ML; MG/100ML; MG/100ML; MG/100ML
INJECTION, SOLUTION INTRAVENOUS CONTINUOUS
Status: DISCONTINUED | OUTPATIENT
Start: 2020-10-16 | End: 2020-10-16 | Stop reason: HOSPADM

## 2020-10-16 RX ORDER — SODIUM CHLORIDE 0.9 % (FLUSH) 0.9 %
10 SYRINGE (ML) INJECTION EVERY 12 HOURS SCHEDULED
Status: DISCONTINUED | OUTPATIENT
Start: 2020-10-16 | End: 2020-10-16 | Stop reason: HOSPADM

## 2020-10-16 RX ORDER — 0.9 % SODIUM CHLORIDE 0.9 %
1000 INTRAVENOUS SOLUTION INTRAVENOUS ONCE
Status: COMPLETED | OUTPATIENT
Start: 2020-10-16 | End: 2020-10-16

## 2020-10-16 RX ORDER — CHLORHEXIDINE GLUCONATE 4 G/100ML
SOLUTION TOPICAL
Status: DISCONTINUED | OUTPATIENT
Start: 2020-10-16 | End: 2020-10-16 | Stop reason: HOSPADM

## 2020-10-16 RX ORDER — NEOSTIGMINE METHYLSULFATE 1 MG/ML
INJECTION, SOLUTION INTRAVENOUS PRN
Status: DISCONTINUED | OUTPATIENT
Start: 2020-10-16 | End: 2020-10-16 | Stop reason: SDUPTHER

## 2020-10-16 RX ORDER — PROMETHAZINE HYDROCHLORIDE 25 MG/ML
25 INJECTION, SOLUTION INTRAMUSCULAR; INTRAVENOUS ONCE
Status: COMPLETED | OUTPATIENT
Start: 2020-10-16 | End: 2020-10-16

## 2020-10-16 RX ORDER — ROCURONIUM BROMIDE 10 MG/ML
INJECTION, SOLUTION INTRAVENOUS PRN
Status: DISCONTINUED | OUTPATIENT
Start: 2020-10-16 | End: 2020-10-16 | Stop reason: SDUPTHER

## 2020-10-16 RX ORDER — ONDANSETRON 2 MG/ML
INJECTION INTRAMUSCULAR; INTRAVENOUS PRN
Status: DISCONTINUED | OUTPATIENT
Start: 2020-10-16 | End: 2020-10-16 | Stop reason: SDUPTHER

## 2020-10-16 RX ADMIN — FENTANYL CITRATE 100 MCG: 50 INJECTION, SOLUTION INTRAMUSCULAR; INTRAVENOUS at 11:24

## 2020-10-16 RX ADMIN — DEXAMETHASONE SODIUM PHOSPHATE 10 MG: 10 INJECTION, SOLUTION INTRAMUSCULAR; INTRAVENOUS at 11:24

## 2020-10-16 RX ADMIN — SODIUM CHLORIDE, POTASSIUM CHLORIDE, SODIUM LACTATE AND CALCIUM CHLORIDE: 600; 310; 30; 20 INJECTION, SOLUTION INTRAVENOUS at 11:17

## 2020-10-16 RX ADMIN — PROMETHAZINE HYDROCHLORIDE 25 MG: 25 INJECTION INTRAMUSCULAR; INTRAVENOUS at 22:01

## 2020-10-16 RX ADMIN — PHENYLEPHRINE HYDROCHLORIDE 100 MCG: 10 INJECTION INTRAVENOUS at 11:53

## 2020-10-16 RX ADMIN — PROPOFOL 150 MG: 10 INJECTION, EMULSION INTRAVENOUS at 11:24

## 2020-10-16 RX ADMIN — HYDROMORPHONE HYDROCHLORIDE 0.5 MG: 1 INJECTION, SOLUTION INTRAMUSCULAR; INTRAVENOUS; SUBCUTANEOUS at 13:25

## 2020-10-16 RX ADMIN — METOCLOPRAMIDE HYDROCHLORIDE 10 MG: 5 INJECTION INTRAMUSCULAR; INTRAVENOUS at 22:01

## 2020-10-16 RX ADMIN — Medication 3 MG: at 12:12

## 2020-10-16 RX ADMIN — GLYCOPYRROLATE 0.6 MG: 0.2 INJECTION, SOLUTION INTRAMUSCULAR; INTRAVENOUS at 12:12

## 2020-10-16 RX ADMIN — Medication 2 G: at 11:38

## 2020-10-16 RX ADMIN — FENTANYL CITRATE 50 MCG: 50 INJECTION, SOLUTION INTRAMUSCULAR; INTRAVENOUS at 12:27

## 2020-10-16 RX ADMIN — FENTANYL CITRATE 50 MCG: 50 INJECTION, SOLUTION INTRAMUSCULAR; INTRAVENOUS at 12:29

## 2020-10-16 RX ADMIN — SODIUM CHLORIDE, POTASSIUM CHLORIDE, SODIUM LACTATE AND CALCIUM CHLORIDE: 600; 310; 30; 20 INJECTION, SOLUTION INTRAVENOUS at 10:23

## 2020-10-16 RX ADMIN — LIDOCAINE HYDROCHLORIDE 100 MG: 20 INJECTION, SOLUTION INFILTRATION; PERINEURAL at 11:24

## 2020-10-16 RX ADMIN — MIDAZOLAM 2 MG: 1 INJECTION INTRAMUSCULAR; INTRAVENOUS at 11:17

## 2020-10-16 RX ADMIN — ONDANSETRON 4 MG: 2 INJECTION INTRAMUSCULAR; INTRAVENOUS at 12:10

## 2020-10-16 RX ADMIN — SODIUM CHLORIDE 1000 ML: 9 INJECTION, SOLUTION INTRAVENOUS at 22:04

## 2020-10-16 RX ADMIN — PHENYLEPHRINE HYDROCHLORIDE 100 MCG: 10 INJECTION INTRAVENOUS at 11:56

## 2020-10-16 RX ADMIN — HYDROMORPHONE HYDROCHLORIDE 0.5 MG: 1 INJECTION, SOLUTION INTRAMUSCULAR; INTRAVENOUS; SUBCUTANEOUS at 13:04

## 2020-10-16 RX ADMIN — ROCURONIUM BROMIDE 40 MG: 10 INJECTION, SOLUTION INTRAVENOUS at 11:24

## 2020-10-16 ASSESSMENT — PULMONARY FUNCTION TESTS
PIF_VALUE: 18
PIF_VALUE: 18
PIF_VALUE: 4
PIF_VALUE: 18
PIF_VALUE: 2
PIF_VALUE: 15
PIF_VALUE: 22
PIF_VALUE: 2
PIF_VALUE: 15
PIF_VALUE: 18
PIF_VALUE: 18
PIF_VALUE: 3
PIF_VALUE: 18
PIF_VALUE: 24
PIF_VALUE: 1
PIF_VALUE: 1
PIF_VALUE: 18
PIF_VALUE: 22
PIF_VALUE: 0
PIF_VALUE: 13
PIF_VALUE: 18
PIF_VALUE: 17
PIF_VALUE: 22
PIF_VALUE: 22
PIF_VALUE: 19
PIF_VALUE: 18
PIF_VALUE: 18
PIF_VALUE: 21
PIF_VALUE: 18
PIF_VALUE: 18
PIF_VALUE: 1
PIF_VALUE: 5
PIF_VALUE: 3
PIF_VALUE: 1
PIF_VALUE: 18
PIF_VALUE: 19
PIF_VALUE: 17
PIF_VALUE: 5
PIF_VALUE: 18
PIF_VALUE: 22
PIF_VALUE: 18
PIF_VALUE: 19
PIF_VALUE: 18
PIF_VALUE: 18
PIF_VALUE: 1
PIF_VALUE: 22
PIF_VALUE: 18
PIF_VALUE: 19
PIF_VALUE: 18
PIF_VALUE: 22
PIF_VALUE: 21
PIF_VALUE: 4
PIF_VALUE: 1
PIF_VALUE: 1
PIF_VALUE: 18
PIF_VALUE: 22
PIF_VALUE: 18
PIF_VALUE: 19
PIF_VALUE: 19
PIF_VALUE: 0
PIF_VALUE: 15
PIF_VALUE: 18
PIF_VALUE: 18

## 2020-10-16 ASSESSMENT — PAIN DESCRIPTION - ORIENTATION
ORIENTATION: RIGHT

## 2020-10-16 ASSESSMENT — PAIN DESCRIPTION - FREQUENCY
FREQUENCY: CONTINUOUS
FREQUENCY: CONTINUOUS

## 2020-10-16 ASSESSMENT — ENCOUNTER SYMPTOMS
SHORTNESS OF BREATH: 0
NAUSEA: 1
CHEST TIGHTNESS: 0
ABDOMINAL PAIN: 0
VOMITING: 1

## 2020-10-16 ASSESSMENT — PAIN DESCRIPTION - PAIN TYPE
TYPE: ACUTE PAIN;SURGICAL PAIN
TYPE: SURGICAL PAIN
TYPE: SURGICAL PAIN

## 2020-10-16 ASSESSMENT — PAIN - FUNCTIONAL ASSESSMENT
PAIN_FUNCTIONAL_ASSESSMENT: PREVENTS OR INTERFERES SOME ACTIVE ACTIVITIES AND ADLS
PAIN_FUNCTIONAL_ASSESSMENT: 0-10

## 2020-10-16 ASSESSMENT — PAIN DESCRIPTION - LOCATION
LOCATION: SHOULDER

## 2020-10-16 ASSESSMENT — PAIN DESCRIPTION - ONSET
ONSET: ON-GOING
ONSET: GRADUAL

## 2020-10-16 ASSESSMENT — PAIN SCALES - GENERAL
PAINLEVEL_OUTOF10: 8
PAINLEVEL_OUTOF10: 10
PAINLEVEL_OUTOF10: 3
PAINLEVEL_OUTOF10: 0

## 2020-10-16 ASSESSMENT — PAIN DESCRIPTION - DESCRIPTORS
DESCRIPTORS: ACHING;DISCOMFORT
DESCRIPTORS: ACHING
DESCRIPTORS: ACHING;BURNING
DESCRIPTORS: DISCOMFORT;CONSTANT

## 2020-10-16 ASSESSMENT — PAIN DESCRIPTION - PROGRESSION
CLINICAL_PROGRESSION: NOT CHANGED
CLINICAL_PROGRESSION: GRADUALLY IMPROVING

## 2020-10-16 NOTE — OP NOTE
[address] [place]                                OPERATIVE REPORT    PATIENT NAME: Gus Moore               :        1960  MED REC NO:   [id number]                            ROOM:  ACCOUNT NO:   [de-identified]                           ADMIT DATE: 10/16/2020  PROVIDER:     Flo Jarvis MD    DATE OF PROCEDURE:  10/16/2020  Pre op dx is same as diagnosis   DIAGNOSIS:  Massive tear of the right rotator cuff muscle. PROCEDURE PERFORMED:  Mini open rotator cuff repair, right shoulder. SURGEON:  Flo Jarvis MD    ANESTHESIA:  General.    INDICATIONS:  A pleasant individual with a MRI documented large tear of  the rotator cuff. Findings of surgery show a massive tear of the  infraspinatus, supraspinatus, and subscapularis with retraction. Biceps  tendon was intact. OPERATIVE PROCEDURE:  The patient was taken to operating room #4, placed  under general anesthesia with endotracheal tube intubation _____. The  patient was placed in beach chair position with _____. The right  shoulder was sterilely prepped and draped in the usual fashion. Proper  time-out was performed. Incision was made over the anterolateral aspect  of the shoulder, carried down through the deltoid. Retractors were  placed. Evaluation showed a tear involving the superior 25% of the  subscapularis, the entirety of the supraspinatus, and the anterior  superior half of the infraspinatus. The rotator cuff bed incisional  site was debrided with a rongeur. I was able to put FiberTape in a  horizontal mattress type of fashion on the supraspinatus and  infraspinatus. I was able to bring the tendon down to the medial bed of  the greater tuberosity. I securely fixed the suture into the bone with  absorbable Arthrex suture anchor. The repair was tenuous. It was  reinforced with the FiberWire suture embedded in the screws.
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
## 2020-10-16 NOTE — ANESTHESIA POSTPROCEDURE EVALUATION
Department of Anesthesiology  Postprocedure Note    Patient: Huma Sinha  MRN: [id number]  YOB: 1960  Date of evaluation: 10/16/2020  Time:  6:00 PM     Procedure Summary     Date:  10/16/20 Room / Location:  [address] [place] [address] / [facility]    Anesthesia Start:  1117 Anesthesia Stop:  7944    Procedure:  RIGHT SHOULDER MINI OPEN ROTATOR CUFF REPAIR ([address]) (Right ) Diagnosis:  (TRAUMATIC TEAR RIGHT ROTATOR CUFF)    Surgeon:  Darrius Lee MD Responsible Provider:  Trev Chase MD    Anesthesia Type:  general ASA Status:  3          Anesthesia Type: general    Annalise Phase I: Annalise Score: 10    Annalise Phase II: Annalise Score: 10    Last vitals: Reviewed and per EMR flowsheets.        Anesthesia Post Evaluation    Patient location during evaluation: PACU  Patient participation: complete - patient participated  Level of consciousness: awake  Airway patency: patent  Nausea & Vomiting: no nausea and no vomiting  Complications: no  Cardiovascular status: hemodynamically stable  Respiratory status: acceptable  Hydration status: stable

## 2020-10-17 RX ORDER — ONDANSETRON 4 MG/1
4 TABLET, ORALLY DISINTEGRATING ORAL EVERY 8 HOURS PRN
Qty: 10 TABLET | Refills: 0 | Status: SHIPPED | OUTPATIENT
Start: 2020-10-17 | End: 2021-01-26 | Stop reason: CLARIF

## 2020-10-17 NOTE — ED NOTES
Discharge, medication, and follow-up instructions reviewed with patient. Pt questions/concerns answered. Pt verbalized understanding.         Ant Fernandes RN  10/17/20 2216
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
## 2020-10-19 ENCOUNTER — TELEPHONE (OUTPATIENT)
Dept: SLEEP MEDICINE | Age: 60
End: 2020-10-19

## 2020-10-26 NOTE — H&P
[address]                              HISTORY AND PHYSICAL    PATIENT NAME: Zarina Williamson               :        1960  MED REC NO:   [id number]                            ROOM:  ACCOUNT NO:   [de-identified]                           ADMIT DATE: 10/16/2020  PROVIDER:     Villa Will MD    ADDENDUM    UPDATE FOR H and P scanned on 2020. The H and P was updated and signed.         Robson Goode MD    D: 10/25/2020 8:20:36       T: 10/25/2020 10:52:46     [email or web address]  Job#: [id number]     Doc#: [id number]

## 2020-10-30 ENCOUNTER — TELEPHONE (OUTPATIENT)
Dept: SLEEP MEDICINE | Age: 60
End: 2020-10-30

## 2020-11-02 NOTE — TELEPHONE ENCOUNTER
Call to pt to reschedule appt,LM with call back #, and that appt has been cancelled d/t non return of phone calls

## 2021-01-21 ENCOUNTER — OFFICE VISIT (OUTPATIENT)
Dept: SLEEP MEDICINE | Age: 61
End: 2021-01-21
Payer: COMMERCIAL

## 2021-01-21 VITALS
BODY MASS INDEX: 25.91 KG/M2 | DIASTOLIC BLOOD PRESSURE: 81 MMHG | WEIGHT: 185.1 LBS | HEART RATE: 76 BPM | SYSTOLIC BLOOD PRESSURE: 136 MMHG | RESPIRATION RATE: 14 BRPM | HEIGHT: 71 IN | OXYGEN SATURATION: 99 %

## 2021-01-21 DIAGNOSIS — G47.33 OBSTRUCTIVE SLEEP APNEA: Primary | ICD-10-CM

## 2021-01-21 DIAGNOSIS — E66.3 OVERWEIGHT: ICD-10-CM

## 2021-01-21 PROCEDURE — 99203 OFFICE O/P NEW LOW 30 MIN: CPT | Performed by: STUDENT IN AN ORGANIZED HEALTH CARE EDUCATION/TRAINING PROGRAM

## 2021-01-21 RX ORDER — ACETAMINOPHEN 500 MG
500 TABLET ORAL EVERY 6 HOURS PRN
COMMUNITY
End: 2021-12-01 | Stop reason: CLARIF

## 2021-01-21 ASSESSMENT — SLEEP AND FATIGUE QUESTIONNAIRES
HOW LIKELY ARE YOU TO NOD OFF OR FALL ASLEEP WHEN YOU ARE A PASSENGER IN A CAR FOR AN HOUR WITHOUT A BREAK: 0
HOW LIKELY ARE YOU TO NOD OFF OR FALL ASLEEP WHILE SITTING AND TALKING TO SOMEONE: 0
HOW LIKELY ARE YOU TO NOD OFF OR FALL ASLEEP WHILE WATCHING TV: 1
HOW LIKELY ARE YOU TO NOD OFF OR FALL ASLEEP WHILE SITTING QUIETLY AFTER LUNCH WITHOUT ALCOHOL: 0
HOW LIKELY ARE YOU TO NOD OFF OR FALL ASLEEP WHILE SITTING AND READING: 0
HOW LIKELY ARE YOU TO NOD OFF OR FALL ASLEEP WHILE SITTING INACTIVE IN A PUBLIC PLACE: 0

## 2021-01-21 NOTE — PATIENT INSTRUCTIONS
It was a pleasure seeing you today Stephanie Bernal! Summary of Today's Visit        PAP HELP  - Please try to  use your PAP device longer while sleeping (Goal: 7-8 hours nightly)  - Please consider PAP Nap (mini sleep study) to get more comfortable with wearing the PAP Mask or a referral to sleep psychology for desensitization to wearing the PAP mask  - Also consider using a CPAP pillow. - Please take a look at the common problems and solutions below or in your handout. Please note that complications of KARINA if not treated can increase risk for: systemic hypertension , pulmonary hypertension (high blood pressure in the lungs), cardiovascular morbidities (heart attack and stroke), car accidents and all cause mortality (increased risk of death). Please call our sleep nurses to schedule a follow up at - [phone] option 2              1. Continue using your machine nightly     ------------Please bring your machine/Data Card to every visit. -------------     -Request all data downloads be sent to my nurse        2. Contact your [employer] company about supplies or issues with your machine. As a general guideline, please replace your:  -CPAP mask every 3-6 months  -CPAP hose  every 3-6 months  -Filter every 2-4 weeks  -CPAP/BiPAP/ASV replacements every 5-7+ years     3. Continue healthy weight loss/stabilization, as this is recommended as a long-term intervention. 4. Please do not drive or operate machinery when you feel fatigued/sleepy/drowsy      5. Please try to sleep between 6-8 hours nightly with the CPAP mask    6. Please avoid sedatives, alcohol and caffeinated drinks at/near bed time. 7. Please call your supply (DME) company with any issues with your PAP device.  Please call our office with any issues pertaining to the therapy.   ----Please note that complications of KARINA if not treated can increase risk for: systemic hypertension, pulmonary hypertension, cardiovascular morbidities (heart attack and stroke), car accidents and all cause mortality. For general questions or scheduling issues, call my nurse at [phone] option [id number] [facility], [address] [place], [place] [phone] option 2  f- [phone]           ----------------------------------------------------------  Common Issues and Solutions     Pillow is dislodging mask - Consider getting a CPAP pillow or switching to a mask with the hose at the top. Dry Mouth - Adjust Humidity and/or try Biotene Gel. (Excessive leak can also cause this)     Leak - Please call your equipment provider  as they may need to adjust your mask. Difficulty tolerating the PAP mask - Contact your equipment company to try a different mask, we can order a \"mini sleep study\"with the sleep tech to try different masks/settings of pressure, also we have a sleep psychologist to help with anxiety related to wearing the PAP mask      ----------------------------------------------------------     For Questions and Concerns: In case of difficulty with your PAP device or mask interface, please FIRST contact your [facility] (The company who provides you the CPAP/BiPAP/ASV machine/supplies). If you need the number for any other [employer] company, please call my Nurse at [phone] option 2     For questions concerning your [place] appointment: Call [phone] option 2  SLEEP LAB SCHEDULING:        ----------------------------------------------------------     Helpful Web Sites: For patients with ALL SLEEP DISORDERS: American Academy of Sleep Medicine http://sleepeducation. org; or [employer]: https://sleepfoundation. org  For patients with KARINA: American Sleep Apnea Association: http://mcgraw.org/. org  For patients with RLS: RLS Foundation: Jean Carlos.es  For patients with INSOMNIA: https://www.sims.org/. org/articles/sleep/insomnia-causes-and-cures. htm  For patients with DEPRESSION: GeneAssess.com.[employer]. com/depression            Learning About CPAP for Sleep Apnea  What is CPAP? CPAP/Bi-PAP is a small machine that you use at home every night while you sleep. It increases air pressure in your throat to keep your airway open. When you have sleep apnea, this can help you sleep better so you feel much better. CPAP stands for \"continuous positive airway pressure. \" Bi-PAP is a different PAP setting that allows for different pressures when you breathe in and out. The CPAP/Bi-PAP machine will have one of the following:  · A mask that covers your nose and mouth  · Prongs that fit into your nose  · A mask that covers your nose only, the most common type. This type is called NCPAP. The N stands for \"nasal.\"  Why is it done? CPAP is a common/effective treatment for obstructive sleep apnea. How does it help? · CPAP can help you have more normal sleep, so you feel less sleepy and more alert during the daytime through the treatment of sleep apnea. · CPAP may help keep heart failure or other heart problems from getting worse. · CPAP may help lower your blood pressure. · If you use CPAP, your bed partner may also sleep better because you are snoring less. What are the side effects? Some people who use CPAP have:  · A dry or stuffy nose and a sore throat. · Irritated skin on the face. · Sore eyes. · Bloating. If you have any of these problems, work with your doctor to fix them. Here are some things you can try:  · Be sure the mask or nasal prongs fit well. · See if your doctor can adjust the pressure of your CPAP. · If your nose is dry, try a humidifier. If these things do not help, you might try a different type of machine. Some machines have air pressure that adjusts on its own. Others have air pressures that are different when you breathe in than when you breathe out. This may reduce discomfort caused by too much pressure in your nose.   Where can you learn more?  Go to https://chpepiceweb.healthCubic Telecom. org and sign in to your [employer] account. Enter Z709 in the [facility] box to learn more about \"Learning About CPAP for Sleep Apnea. \"     If you do not have an account, please click on the \"Sign Up Now\" link. Current as of: February 24, 2020               Content Version: 12.5  © 2006-2020 Healthwise, Incorporated. Care instructions adapted under license by [facility] ([facility]). If you have questions about a medical condition or this instruction, always ask your healthcare professional. [place] [address] any warranty or liability for your use of this information.
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
## 2021-01-26 PROBLEM — G47.33 OBSTRUCTIVE SLEEP APNEA: Chronic | Status: ACTIVE | Noted: 2020-08-04

## 2021-01-26 PROBLEM — E78.5 DYSLIPIDEMIA: Status: ACTIVE | Noted: 2021-01-26

## 2021-01-26 PROBLEM — K21.9 GASTROESOPHAGEAL REFLUX DISEASE WITHOUT ESOPHAGITIS: Chronic | Status: ACTIVE | Noted: 2017-06-23

## 2021-01-26 PROBLEM — E55.9 VITAMIN D DEFICIENCY: Status: ACTIVE | Noted: 2021-01-26

## 2021-05-17 ENCOUNTER — HOSPITAL ENCOUNTER (OUTPATIENT)
Age: 61
Discharge: HOME | End: 2021-05-17
Payer: COMMERCIAL

## 2021-05-17 DIAGNOSIS — G40.909 SEIZURE DISORDER (HCC): ICD-10-CM

## 2021-05-17 DIAGNOSIS — E78.5 DYSLIPIDEMIA: ICD-10-CM

## 2021-05-17 DIAGNOSIS — E55.9 VITAMIN D DEFICIENCY: ICD-10-CM

## 2021-05-17 DIAGNOSIS — K21.9 GASTROESOPHAGEAL REFLUX DISEASE WITHOUT ESOPHAGITIS: ICD-10-CM

## 2021-05-17 LAB
ALBUMIN SERPL-MCNC: 4.3 G/DL (ref 3.5–5.2)
ALP BLD-CCNC: 111 U/L (ref 40–129)
ALT SERPL-CCNC: 14 U/L (ref 0–40)
ANION GAP SERPL CALCULATED.3IONS-SCNC: 9 MMOL/L (ref 7–16)
AST SERPL-CCNC: 18 U/L (ref 0–39)
BASOPHILS ABSOLUTE: 0.03 E9/L (ref 0–0.2)
BASOPHILS RELATIVE PERCENT: 0.8 % (ref 0–2)
BILIRUB SERPL-MCNC: 0.3 MG/DL (ref 0–1.2)
BUN BLDV-MCNC: 12 MG/DL (ref 6–23)
CALCIUM SERPL-MCNC: 9.2 MG/DL (ref 8.6–10.2)
CHLORIDE BLD-SCNC: 104 MMOL/L (ref 98–107)
CHOLESTEROL, TOTAL: 219 MG/DL (ref 0–199)
CO2: 28 MMOL/L (ref 22–29)
CREAT SERPL-MCNC: 0.7 MG/DL (ref 0.7–1.2)
EOSINOPHILS ABSOLUTE: 0.04 E9/L (ref 0.05–0.5)
EOSINOPHILS RELATIVE PERCENT: 1.1 % (ref 0–6)
GFR AFRICAN AMERICAN: >60
GFR NON-AFRICAN AMERICAN: >60 ML/MIN/1.73
GLUCOSE BLD-MCNC: 104 MG/DL (ref 74–99)
HCT VFR BLD CALC: 43.2 % (ref 37–54)
HDLC SERPL-MCNC: 79 MG/DL
HEMOGLOBIN: 14.7 G/DL (ref 12.5–16.5)
IMMATURE GRANULOCYTES #: 0.01 E9/L
IMMATURE GRANULOCYTES %: 0.3 % (ref 0–5)
LDL CHOLESTEROL CALCULATED: 125 MG/DL (ref 0–99)
LYMPHOCYTES ABSOLUTE: 1.64 E9/L (ref 1.5–4)
LYMPHOCYTES RELATIVE PERCENT: 45.9 % (ref 20–42)
MCH RBC QN AUTO: 32.6 PG (ref 26–35)
MCHC RBC AUTO-ENTMCNC: 34 % (ref 32–34.5)
MCV RBC AUTO: 95.8 FL (ref 80–99.9)
MONOCYTES ABSOLUTE: 0.36 E9/L (ref 0.1–0.95)
MONOCYTES RELATIVE PERCENT: 10.1 % (ref 2–12)
NEUTROPHILS ABSOLUTE: 1.49 E9/L (ref 1.8–7.3)
NEUTROPHILS RELATIVE PERCENT: 41.8 % (ref 43–80)
PDW BLD-RTO: 13.9 FL (ref 11.5–15)
PHENYTOIN DOSE AMOUNT: ABNORMAL
PHENYTOIN LEVEL: 23.1 UG/ML (ref 10–20)
PLATELET # BLD: 203 E9/L (ref 130–450)
PMV BLD AUTO: 12 FL (ref 7–12)
POTASSIUM SERPL-SCNC: 4.1 MMOL/L (ref 3.5–5)
RBC # BLD: 4.51 E12/L (ref 3.8–5.8)
SODIUM BLD-SCNC: 141 MMOL/L (ref 132–146)
TOTAL PROTEIN: 6.6 G/DL (ref 6.4–8.3)
TRIGL SERPL-MCNC: 75 MG/DL (ref 0–149)
VITAMIN D 25-HYDROXY: 53 NG/ML (ref 30–100)
VLDLC SERPL CALC-MCNC: 15 MG/DL
WBC # BLD: 3.6 E9/L (ref 4.5–11.5)

## 2021-05-17 PROCEDURE — 80061 LIPID PANEL: CPT

## 2021-05-17 PROCEDURE — 82306 VITAMIN D 25 HYDROXY: CPT

## 2021-05-17 PROCEDURE — 80185 ASSAY OF PHENYTOIN TOTAL: CPT

## 2021-05-17 PROCEDURE — 85025 COMPLETE CBC W/AUTO DIFF WBC: CPT

## 2021-05-17 PROCEDURE — 36415 COLL VENOUS BLD VENIPUNCTURE: CPT

## 2021-05-17 PROCEDURE — 80053 COMPREHEN METABOLIC PANEL: CPT

## 2021-11-30 ENCOUNTER — HOSPITAL ENCOUNTER (OUTPATIENT)
Age: 61
Discharge: HOME | End: 2021-11-30
Payer: COMMERCIAL

## 2021-11-30 DIAGNOSIS — Z12.5 PROSTATE CANCER SCREENING: ICD-10-CM

## 2021-11-30 DIAGNOSIS — E55.9 VITAMIN D DEFICIENCY: ICD-10-CM

## 2021-11-30 DIAGNOSIS — A60.01 HERPES SIMPLEX INFECTION OF PENIS: ICD-10-CM

## 2021-11-30 DIAGNOSIS — E78.5 DYSLIPIDEMIA: ICD-10-CM

## 2021-11-30 DIAGNOSIS — R73.01 IFG (IMPAIRED FASTING GLUCOSE): ICD-10-CM

## 2021-11-30 DIAGNOSIS — G40.909 SEIZURE DISORDER (HCC): ICD-10-CM

## 2021-11-30 LAB
ALBUMIN SERPL-MCNC: 4.3 G/DL (ref 3.5–5.2)
ALP BLD-CCNC: 109 U/L (ref 40–129)
ALT SERPL-CCNC: 13 U/L (ref 0–40)
ANION GAP SERPL CALCULATED.3IONS-SCNC: 8 MMOL/L (ref 7–16)
AST SERPL-CCNC: 16 U/L (ref 0–39)
BASOPHILS ABSOLUTE: 0.02 E9/L (ref 0–0.2)
BASOPHILS RELATIVE PERCENT: 0.6 % (ref 0–2)
BILIRUB SERPL-MCNC: 0.2 MG/DL (ref 0–1.2)
BUN BLDV-MCNC: 14 MG/DL (ref 6–23)
CALCIUM SERPL-MCNC: 9 MG/DL (ref 8.6–10.2)
CHLORIDE BLD-SCNC: 102 MMOL/L (ref 98–107)
CHOLESTEROL, TOTAL: 207 MG/DL (ref 0–199)
CO2: 29 MMOL/L (ref 22–29)
CREAT SERPL-MCNC: 0.8 MG/DL (ref 0.7–1.2)
EOSINOPHILS ABSOLUTE: 0.06 E9/L (ref 0.05–0.5)
EOSINOPHILS RELATIVE PERCENT: 1.7 % (ref 0–6)
GFR AFRICAN AMERICAN: >60
GFR NON-AFRICAN AMERICAN: >60 ML/MIN/1.73
GLUCOSE BLD-MCNC: 122 MG/DL (ref 74–99)
HBA1C MFR BLD: 5.1 % (ref 4–5.6)
HCT VFR BLD CALC: 43.1 % (ref 37–54)
HDLC SERPL-MCNC: 67 MG/DL
HEMOGLOBIN: 14.7 G/DL (ref 12.5–16.5)
IMMATURE GRANULOCYTES #: 0 E9/L
IMMATURE GRANULOCYTES %: 0 % (ref 0–5)
LDL CHOLESTEROL CALCULATED: 121 MG/DL (ref 0–99)
LYMPHOCYTES ABSOLUTE: 1.53 E9/L (ref 1.5–4)
LYMPHOCYTES RELATIVE PERCENT: 44.6 % (ref 20–42)
MCH RBC QN AUTO: 32.6 PG (ref 26–35)
MCHC RBC AUTO-ENTMCNC: 34.1 % (ref 32–34.5)
MCV RBC AUTO: 95.6 FL (ref 80–99.9)
MONOCYTES ABSOLUTE: 0.37 E9/L (ref 0.1–0.95)
MONOCYTES RELATIVE PERCENT: 10.8 % (ref 2–12)
NEUTROPHILS ABSOLUTE: 1.45 E9/L (ref 1.8–7.3)
NEUTROPHILS RELATIVE PERCENT: 42.3 % (ref 43–80)
PDW BLD-RTO: 13.8 FL (ref 11.5–15)
PHENYTOIN DOSE AMOUNT: NORMAL
PHENYTOIN LEVEL: 19.4 UG/ML (ref 10–20)
PLATELET # BLD: 272 E9/L (ref 130–450)
PMV BLD AUTO: 10.4 FL (ref 7–12)
POTASSIUM SERPL-SCNC: 4 MMOL/L (ref 3.5–5)
PROSTATE SPECIFIC ANTIGEN: 2.33 NG/ML (ref 0–4)
RBC # BLD: 4.51 E12/L (ref 3.8–5.8)
SODIUM BLD-SCNC: 139 MMOL/L (ref 132–146)
TOTAL PROTEIN: 7 G/DL (ref 6.4–8.3)
TRIGL SERPL-MCNC: 93 MG/DL (ref 0–149)
VITAMIN D 25-HYDROXY: 53 NG/ML (ref 30–100)
VLDLC SERPL CALC-MCNC: 19 MG/DL
WBC # BLD: 3.4 E9/L (ref 4.5–11.5)

## 2021-11-30 PROCEDURE — 83036 HEMOGLOBIN GLYCOSYLATED A1C: CPT

## 2021-11-30 PROCEDURE — 86694 HERPES SIMPLEX NES ANTBDY: CPT

## 2021-11-30 PROCEDURE — 80053 COMPREHEN METABOLIC PANEL: CPT

## 2021-11-30 PROCEDURE — 36415 COLL VENOUS BLD VENIPUNCTURE: CPT

## 2021-11-30 PROCEDURE — G0103 PSA SCREENING: HCPCS

## 2021-11-30 PROCEDURE — 85025 COMPLETE CBC W/AUTO DIFF WBC: CPT

## 2021-11-30 PROCEDURE — 82306 VITAMIN D 25 HYDROXY: CPT

## 2021-11-30 PROCEDURE — 80061 LIPID PANEL: CPT

## 2021-11-30 PROCEDURE — 80185 ASSAY OF PHENYTOIN TOTAL: CPT

## 2021-11-30 PROCEDURE — 86696 HERPES SIMPLEX TYPE 2 TEST: CPT

## 2021-11-30 PROCEDURE — 86695 HERPES SIMPLEX TYPE 1 TEST: CPT

## 2021-12-01 PROBLEM — E78.5 DYSLIPIDEMIA: Chronic | Status: ACTIVE | Noted: 2021-01-26

## 2021-12-01 PROBLEM — E55.9 VITAMIN D DEFICIENCY: Chronic | Status: ACTIVE | Noted: 2021-01-26

## 2021-12-04 LAB
HERPES TYPE 1/2 IGM COMBINED: 0.95 IV
HERPES TYPE I/II IGG COMBINED: >22.4 IV
HSV 1 GLYCOPROTEIN G AB IGG: 26.9 IV
HSV 2 GLYCOPROTEIN G AB IGG: 11.6 IV

## 2022-06-07 ENCOUNTER — HOSPITAL ENCOUNTER (OUTPATIENT)
Age: 62
Discharge: HOME | End: 2022-06-07
Payer: MEDICARE

## 2022-06-07 DIAGNOSIS — K21.9 GASTROESOPHAGEAL REFLUX DISEASE WITHOUT ESOPHAGITIS: ICD-10-CM

## 2022-06-07 DIAGNOSIS — G40.909 SEIZURE DISORDER (HCC): ICD-10-CM

## 2022-06-07 DIAGNOSIS — R73.01 IFG (IMPAIRED FASTING GLUCOSE): ICD-10-CM

## 2022-06-07 DIAGNOSIS — E55.9 VITAMIN D DEFICIENCY: ICD-10-CM

## 2022-06-07 DIAGNOSIS — E78.5 DYSLIPIDEMIA: ICD-10-CM

## 2022-06-07 LAB
ALBUMIN SERPL-MCNC: 4.3 G/DL (ref 3.5–5.2)
ALP BLD-CCNC: 116 U/L (ref 40–129)
ALT SERPL-CCNC: 14 U/L (ref 0–40)
ANION GAP SERPL CALCULATED.3IONS-SCNC: 6 MMOL/L (ref 7–16)
AST SERPL-CCNC: 18 U/L (ref 0–39)
BASOPHILS ABSOLUTE: 0.04 E9/L (ref 0–0.2)
BASOPHILS RELATIVE PERCENT: 0.9 % (ref 0–2)
BILIRUB SERPL-MCNC: 0.3 MG/DL (ref 0–1.2)
BUN BLDV-MCNC: 16 MG/DL (ref 6–23)
BURR CELLS: ABNORMAL
CALCIUM SERPL-MCNC: 9.1 MG/DL (ref 8.6–10.2)
CHLORIDE BLD-SCNC: 105 MMOL/L (ref 98–107)
CHOLESTEROL, TOTAL: 220 MG/DL (ref 0–199)
CO2: 30 MMOL/L (ref 22–29)
CREAT SERPL-MCNC: 0.7 MG/DL (ref 0.7–1.2)
EOSINOPHILS ABSOLUTE: 0.12 E9/L (ref 0.05–0.5)
EOSINOPHILS RELATIVE PERCENT: 2.7 % (ref 0–6)
GFR AFRICAN AMERICAN: >60
GFR NON-AFRICAN AMERICAN: >60 ML/MIN/1.73
GLUCOSE BLD-MCNC: 97 MG/DL (ref 74–99)
HBA1C MFR BLD: 4.9 % (ref 4–5.6)
HCT VFR BLD CALC: 42.3 % (ref 37–54)
HDLC SERPL-MCNC: 79 MG/DL
HEMOGLOBIN: 14.6 G/DL (ref 12.5–16.5)
HOWELL-JOLLY BODIES: ABNORMAL
LDL CHOLESTEROL CALCULATED: 124 MG/DL (ref 0–99)
LYMPHOCYTES ABSOLUTE: 1.59 E9/L (ref 1.5–4)
LYMPHOCYTES RELATIVE PERCENT: 37.2 % (ref 20–42)
MCH RBC QN AUTO: 32.9 PG (ref 26–35)
MCHC RBC AUTO-ENTMCNC: 34.5 % (ref 32–34.5)
MCV RBC AUTO: 95.3 FL (ref 80–99.9)
MONOCYTES ABSOLUTE: 0.39 E9/L (ref 0.1–0.95)
MONOCYTES RELATIVE PERCENT: 8.8 % (ref 2–12)
NEUTROPHILS ABSOLUTE: 2.15 E9/L (ref 1.8–7.3)
NEUTROPHILS RELATIVE PERCENT: 50.4 % (ref 43–80)
PAPPENHEIMER BODIES: ABNORMAL
PDW BLD-RTO: 13.9 FL (ref 11.5–15)
PHENYTOIN DOSE AMOUNT: ABNORMAL
PHENYTOIN LEVEL: 23.2 UG/ML (ref 10–20)
PLATELET # BLD: 282 E9/L (ref 130–450)
PMV BLD AUTO: 10.5 FL (ref 7–12)
POIKILOCYTES: ABNORMAL
POTASSIUM SERPL-SCNC: 4.2 MMOL/L (ref 3.5–5)
RBC # BLD: 4.44 E12/L (ref 3.8–5.8)
SODIUM BLD-SCNC: 141 MMOL/L (ref 132–146)
TARGET CELLS: ABNORMAL
TOTAL PROTEIN: 6.8 G/DL (ref 6.4–8.3)
TRIGL SERPL-MCNC: 85 MG/DL (ref 0–149)
VITAMIN D 25-HYDROXY: 58 NG/ML (ref 30–100)
VLDLC SERPL CALC-MCNC: 17 MG/DL
WBC # BLD: 4.3 E9/L (ref 4.5–11.5)

## 2022-06-07 PROCEDURE — 83036 HEMOGLOBIN GLYCOSYLATED A1C: CPT

## 2022-06-07 PROCEDURE — 82306 VITAMIN D 25 HYDROXY: CPT

## 2022-06-07 PROCEDURE — 36415 COLL VENOUS BLD VENIPUNCTURE: CPT

## 2022-06-07 PROCEDURE — 85025 COMPLETE CBC W/AUTO DIFF WBC: CPT

## 2022-06-07 PROCEDURE — 80185 ASSAY OF PHENYTOIN TOTAL: CPT

## 2022-06-07 PROCEDURE — 80053 COMPREHEN METABOLIC PANEL: CPT

## 2022-06-07 PROCEDURE — 80061 LIPID PANEL: CPT

## 2022-06-09 PROBLEM — S40.011A: Status: RESOLVED | Noted: 2019-07-23 | Resolved: 2022-06-09

## 2022-09-02 ENCOUNTER — APPOINTMENT (OUTPATIENT)
Dept: CT IMAGING | Age: 62
End: 2022-09-02
Payer: OTHER MISCELLANEOUS

## 2022-09-02 ENCOUNTER — HOSPITAL ENCOUNTER (EMERGENCY)
Age: 62
Discharge: HOME | End: 2022-09-02
Payer: OTHER MISCELLANEOUS

## 2022-09-02 VITALS
HEIGHT: 70 IN | TEMPERATURE: 97.9 F | HEART RATE: 90 BPM | SYSTOLIC BLOOD PRESSURE: 127 MMHG | DIASTOLIC BLOOD PRESSURE: 79 MMHG | RESPIRATION RATE: 18 BRPM | OXYGEN SATURATION: 96 % | BODY MASS INDEX: 25.05 KG/M2 | WEIGHT: 175 LBS
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
## 2022-09-02 DIAGNOSIS — S16.1XXA STRAIN OF NECK MUSCLE, INITIAL ENCOUNTER: ICD-10-CM

## 2022-09-02 PROCEDURE — 72125 CT NECK SPINE W/O DYE: CPT

## 2022-09-02 PROCEDURE — 99284 EMERGENCY DEPT VISIT MOD MDM: CPT

## 2022-09-02 ASSESSMENT — PAIN - FUNCTIONAL ASSESSMENT: PAIN_FUNCTIONAL_ASSESSMENT: 0-10

## 2022-09-02 ASSESSMENT — PAIN DESCRIPTION - LOCATION: LOCATION: NECK

## 2022-09-02 ASSESSMENT — PAIN SCALES - GENERAL: PAINLEVEL_OUTOF10: 8

## 2022-09-02 NOTE — Clinical Note
Sy Benavides was seen and treated in our emergency department on 9/2/2022. He may return to work on 09/04/2022. If you have any questions or concerns, please don't hesitate to call.       KIM Carson
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
## 2022-10-10 ENCOUNTER — EVALUATION (OUTPATIENT)
Dept: PHYSICAL THERAPY | Age: 62
End: 2022-10-10
Payer: MEDICARE

## 2022-10-10 DIAGNOSIS — M50.30 DDD (DEGENERATIVE DISC DISEASE), CERVICAL: Primary | ICD-10-CM

## 2022-10-10 DIAGNOSIS — S13.4XXS WHIPLASH INJURY, SEQUELA: ICD-10-CM

## 2022-10-10 PROCEDURE — 97163 PT EVAL HIGH COMPLEX 45 MIN: CPT | Performed by: PHYSICAL THERAPIST
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
## 2022-10-12 ENCOUNTER — TREATMENT (OUTPATIENT)
Dept: PHYSICAL THERAPY | Age: 62
End: 2022-10-12
Payer: MEDICARE

## 2022-10-12 DIAGNOSIS — S13.4XXS WHIPLASH INJURY, SEQUELA: ICD-10-CM

## 2022-10-12 DIAGNOSIS — M50.30 DDD (DEGENERATIVE DISC DISEASE), CERVICAL: Primary | ICD-10-CM

## 2022-10-12 PROCEDURE — G0283 ELEC STIM OTHER THAN WOUND: HCPCS

## 2022-10-12 PROCEDURE — 97112 NEUROMUSCULAR REEDUCATION: CPT
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
## 2022-10-13 PROBLEM — S13.4XXS: Status: ACTIVE | Noted: 2022-10-13

## 2022-10-13 PROBLEM — M50.30 DDD (DEGENERATIVE DISC DISEASE), CERVICAL: Status: ACTIVE | Noted: 2022-10-13

## 2022-10-18 ENCOUNTER — TREATMENT (OUTPATIENT)
Dept: PHYSICAL THERAPY | Age: 62
End: 2022-10-18
Payer: MEDICARE

## 2022-10-18 DIAGNOSIS — S13.4XXS WHIPLASH INJURY, SEQUELA: ICD-10-CM

## 2022-10-18 DIAGNOSIS — M50.30 DDD (DEGENERATIVE DISC DISEASE), CERVICAL: Primary | ICD-10-CM

## 2022-10-18 PROCEDURE — G0283 ELEC STIM OTHER THAN WOUND: HCPCS

## 2022-10-18 PROCEDURE — 97112 NEUROMUSCULAR REEDUCATION: CPT
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
## 2022-10-25 ENCOUNTER — TREATMENT (OUTPATIENT)
Dept: PHYSICAL THERAPY | Age: 62
End: 2022-10-25
Payer: MEDICARE

## 2022-10-25 DIAGNOSIS — S13.4XXS WHIPLASH INJURY, SEQUELA: ICD-10-CM

## 2022-10-25 DIAGNOSIS — M50.30 DDD (DEGENERATIVE DISC DISEASE), CERVICAL: Primary | ICD-10-CM

## 2022-10-25 PROCEDURE — 97110 THERAPEUTIC EXERCISES: CPT

## 2022-10-25 PROCEDURE — G0283 ELEC STIM OTHER THAN WOUND: HCPCS
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
## 2022-11-01 ENCOUNTER — TREATMENT (OUTPATIENT)
Dept: PHYSICAL THERAPY | Age: 62
End: 2022-11-01
Payer: MEDICARE

## 2022-11-01 DIAGNOSIS — M50.30 DDD (DEGENERATIVE DISC DISEASE), CERVICAL: Primary | ICD-10-CM

## 2022-11-01 DIAGNOSIS — S13.4XXS WHIPLASH INJURY, SEQUELA: ICD-10-CM

## 2022-11-01 PROCEDURE — G0283 ELEC STIM OTHER THAN WOUND: HCPCS

## 2022-11-01 PROCEDURE — 97110 THERAPEUTIC EXERCISES: CPT
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
## 2022-11-08 ENCOUNTER — TREATMENT (OUTPATIENT)
Dept: PHYSICAL THERAPY | Age: 62
End: 2022-11-08
Payer: MEDICARE

## 2022-11-08 DIAGNOSIS — M50.30 DDD (DEGENERATIVE DISC DISEASE), CERVICAL: Primary | ICD-10-CM

## 2022-11-08 DIAGNOSIS — S13.4XXS WHIPLASH INJURY, SEQUELA: ICD-10-CM

## 2022-11-08 PROCEDURE — G0283 ELEC STIM OTHER THAN WOUND: HCPCS

## 2022-11-08 PROCEDURE — 97110 THERAPEUTIC EXERCISES: CPT
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
## 2022-11-10 ENCOUNTER — TREATMENT (OUTPATIENT)
Dept: PHYSICAL THERAPY | Age: 62
End: 2022-11-10
Payer: MEDICARE

## 2022-11-10 DIAGNOSIS — M50.30 DDD (DEGENERATIVE DISC DISEASE), CERVICAL: Primary | ICD-10-CM

## 2022-11-10 DIAGNOSIS — S13.4XXS WHIPLASH INJURY, SEQUELA: ICD-10-CM

## 2022-11-10 PROCEDURE — 97014 ELECTRIC STIMULATION THERAPY: CPT

## 2022-11-10 PROCEDURE — 97110 THERAPEUTIC EXERCISES: CPT
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
## 2022-11-15 ENCOUNTER — TREATMENT (OUTPATIENT)
Dept: PHYSICAL THERAPY | Age: 62
End: 2022-11-15
Payer: MEDICARE

## 2022-11-15 DIAGNOSIS — S13.4XXS WHIPLASH INJURY, SEQUELA: ICD-10-CM

## 2022-11-15 DIAGNOSIS — M50.30 DDD (DEGENERATIVE DISC DISEASE), CERVICAL: Primary | ICD-10-CM

## 2022-11-15 PROCEDURE — 97110 THERAPEUTIC EXERCISES: CPT

## 2022-11-15 PROCEDURE — 97014 ELECTRIC STIMULATION THERAPY: CPT
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
## 2022-11-17 ENCOUNTER — TREATMENT (OUTPATIENT)
Dept: PHYSICAL THERAPY | Age: 62
End: 2022-11-17
Payer: MEDICARE

## 2022-11-17 DIAGNOSIS — S13.4XXS WHIPLASH INJURY, SEQUELA: ICD-10-CM

## 2022-11-17 DIAGNOSIS — M50.30 DDD (DEGENERATIVE DISC DISEASE), CERVICAL: Primary | ICD-10-CM

## 2022-11-17 PROCEDURE — 97110 THERAPEUTIC EXERCISES: CPT

## 2022-11-17 PROCEDURE — 97014 ELECTRIC STIMULATION THERAPY: CPT
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
## 2022-11-21 ENCOUNTER — APPOINTMENT (OUTPATIENT)
Dept: GENERAL RADIOLOGY | Age: 62
End: 2022-11-21
Payer: MEDICARE

## 2022-11-21 ENCOUNTER — HOSPITAL ENCOUNTER (EMERGENCY)
Age: 62
Discharge: HOME | End: 2022-11-21
Payer: MEDICARE

## 2022-11-21 VITALS
HEART RATE: 80 BPM | OXYGEN SATURATION: 96 % | TEMPERATURE: 98 F | SYSTOLIC BLOOD PRESSURE: 136 MMHG | DIASTOLIC BLOOD PRESSURE: 67 MMHG | RESPIRATION RATE: 18 BRPM

## 2022-11-21 DIAGNOSIS — S39.012A STRAIN OF LUMBAR REGION, INITIAL ENCOUNTER: Primary | ICD-10-CM

## 2022-11-21 PROCEDURE — 72220 X-RAY EXAM SACRUM TAILBONE: CPT

## 2022-11-21 PROCEDURE — 99283 EMERGENCY DEPT VISIT LOW MDM: CPT

## 2022-11-21 PROCEDURE — 72100 X-RAY EXAM L-S SPINE 2/3 VWS: CPT

## 2022-11-21 RX ORDER — CYCLOBENZAPRINE HCL 10 MG
10 TABLET ORAL EVERY 8 HOURS PRN
Qty: 21 TABLET | Refills: 0 | Status: SHIPPED | OUTPATIENT
Start: 2022-11-21 | End: 2022-12-01

## 2022-11-21 RX ORDER — NAPROXEN 500 MG/1
500 TABLET ORAL 2 TIMES DAILY
Qty: 14 TABLET | Refills: 0 | Status: SHIPPED | OUTPATIENT
Start: 2022-11-21 | End: 2022-11-28

## 2022-11-21 ASSESSMENT — LIFESTYLE VARIABLES: HOW OFTEN DO YOU HAVE A DRINK CONTAINING ALCOHOL: NEVER

## 2022-11-21 ASSESSMENT — PAIN DESCRIPTION - ORIENTATION: ORIENTATION: LEFT

## 2022-11-21 ASSESSMENT — PAIN DESCRIPTION - LOCATION: LOCATION: BACK

## 2022-11-21 ASSESSMENT — PAIN SCALES - GENERAL: PAINLEVEL_OUTOF10: 7

## 2022-11-21 ASSESSMENT — PAIN - FUNCTIONAL ASSESSMENT: PAIN_FUNCTIONAL_ASSESSMENT: 0-10
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
## 2022-11-22 ENCOUNTER — HOSPITAL ENCOUNTER (EMERGENCY)
Age: 62
Discharge: HOME | End: 2022-11-22
Attending: EMERGENCY MEDICINE
Payer: MEDICARE

## 2022-11-22 ENCOUNTER — APPOINTMENT (OUTPATIENT)
Dept: CT IMAGING | Age: 62
End: 2022-11-22
Payer: MEDICARE

## 2022-11-22 VITALS
OXYGEN SATURATION: 96 % | SYSTOLIC BLOOD PRESSURE: 145 MMHG | HEART RATE: 78 BPM | DIASTOLIC BLOOD PRESSURE: 71 MMHG | BODY MASS INDEX: 27.98 KG/M2 | TEMPERATURE: 97.5 F | WEIGHT: 195 LBS | RESPIRATION RATE: 18 BRPM

## 2022-11-22 DIAGNOSIS — S09.90XA CLOSED HEAD INJURY, INITIAL ENCOUNTER: Primary | ICD-10-CM

## 2022-11-22 DIAGNOSIS — S01.81XA FACIAL LACERATION, INITIAL ENCOUNTER: ICD-10-CM
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
## 2022-11-22 PROCEDURE — 6360000002 HC RX W HCPCS: Performed by: STUDENT IN AN ORGANIZED HEALTH CARE EDUCATION/TRAINING PROGRAM

## 2022-11-22 PROCEDURE — 90471 IMMUNIZATION ADMIN: CPT | Performed by: STUDENT IN AN ORGANIZED HEALTH CARE EDUCATION/TRAINING PROGRAM

## 2022-11-22 PROCEDURE — 90714 TD VACC NO PRESV 7 YRS+ IM: CPT | Performed by: STUDENT IN AN ORGANIZED HEALTH CARE EDUCATION/TRAINING PROGRAM

## 2022-11-22 PROCEDURE — 99284 EMERGENCY DEPT VISIT MOD MDM: CPT

## 2022-11-22 PROCEDURE — 70450 CT HEAD/BRAIN W/O DYE: CPT

## 2022-11-22 RX ORDER — TETANUS AND DIPHTHERIA TOXOIDS ADSORBED 2; 2 [LF]/.5ML; [LF]/.5ML
0.5 INJECTION INTRAMUSCULAR ONCE
Status: COMPLETED | OUTPATIENT
Start: 2022-11-22 | End: 2022-11-22

## 2022-11-22 RX ADMIN — TETANUS AND DIPHTHERIA TOXOIDS ADSORBED 0.5 ML: 2; 2 INJECTION INTRAMUSCULAR at 22:14

## 2022-11-22 ASSESSMENT — ENCOUNTER SYMPTOMS
VOMITING: 0
DIARRHEA: 0
PHOTOPHOBIA: 0
SINUS PAIN: 0
VOICE CHANGE: 0
COUGH: 0
SHORTNESS OF BREATH: 0
NAUSEA: 0
ABDOMINAL PAIN: 0
TROUBLE SWALLOWING: 0

## 2022-11-22 ASSESSMENT — PAIN - FUNCTIONAL ASSESSMENT: PAIN_FUNCTIONAL_ASSESSMENT: NONE - DENIES PAIN

## 2022-11-23 ENCOUNTER — TREATMENT (OUTPATIENT)
Dept: PHYSICAL THERAPY | Age: 62
End: 2022-11-23
Payer: MEDICARE

## 2022-11-23 DIAGNOSIS — M50.30 DDD (DEGENERATIVE DISC DISEASE), CERVICAL: Primary | ICD-10-CM

## 2022-11-23 DIAGNOSIS — S13.4XXS WHIPLASH INJURY, SEQUELA: ICD-10-CM

## 2022-11-23 PROCEDURE — G0283 ELEC STIM OTHER THAN WOUND: HCPCS

## 2022-11-23 PROCEDURE — 97110 THERAPEUTIC EXERCISES: CPT
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
## 2022-11-30 ENCOUNTER — EVALUATION (OUTPATIENT)
Dept: PHYSICAL THERAPY | Age: 62
End: 2022-11-30
Payer: MEDICARE

## 2022-11-30 DIAGNOSIS — M54.50 LEFT-SIDED LOW BACK PAIN WITHOUT SCIATICA, UNSPECIFIED CHRONICITY: Primary | ICD-10-CM

## 2022-11-30 DIAGNOSIS — M54.2 NECK PAIN: ICD-10-CM

## 2022-11-30 PROCEDURE — 97163 PT EVAL HIGH COMPLEX 45 MIN: CPT | Performed by: PHYSICAL THERAPIST
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
## 2022-12-07 ENCOUNTER — TREATMENT (OUTPATIENT)
Dept: PHYSICAL THERAPY | Age: 62
End: 2022-12-07

## 2022-12-07 DIAGNOSIS — M54.2 NECK PAIN: ICD-10-CM

## 2022-12-07 DIAGNOSIS — M54.50 LEFT-SIDED LOW BACK PAIN WITHOUT SCIATICA, UNSPECIFIED CHRONICITY: Primary | ICD-10-CM

## 2022-12-07 NOTE — PROGRESS NOTES
Physical Therapy Treatment Note    Date: 2022  Patient Name: Vicente Mcleod  : 1960   MRN: [id number]  DOInjury : 2022   DOSx:  none   Referring Provider: Helga Galloway DO  [facility] Diagnosis:      Diagnosis Orders   1. Left-sided low back pain without sciatica, unspecified chronicity        2. Neck pain              Outcome Measure:      X = TO BE PERFORMED NEXT VISIT  > = PROGRESS TO THIS    S: Pt reports  continued neck, R shoulder pain, and low back now. O: Discussed anatomy, physiology, body mechanics, principles of loading, and progressive loading/activity. Reviewed home exercise program extensively; written copy provided. Time 1400- 1440     Visit   Repeat outcome measure at mid point and end. Pain  6/10      ROM      Modalities Use sparingly if at all. Prefer an active program.     MH + ES to cervical / low back  X 15 min supine   Allowed to use/ p. Pre ex's  MO   Traction    MO         Manual      Cervical soft tissue mobilization    MT   ROM         NR      TE      TE      TE      TE     To gain muscle control in slow movements     Exercise       Cervical AROM all motions  Rest periods as needed between reps  NR   SKTC X 10 new      Trunk rotations  X 10 new      Pelvic tilts  X 10 new            Quadruped neck retraction   NR   Cervical lateral flexion isometrics    NR   Cervical extension isometrics    NR         ROWS: H Green 3 x 15 seated   TE   ROWS: M  Reach and Pull Green 3 x 15 seated   TE   ROWS: L   Reach and Pull Green 3 x 15 seated   TE   Tubing Pushes   TE   Corebuilder    NR   Shoulder ER   TE   Shrugs  TE   Shoulder press ups       Wand shoulder flexion  TE   Wand shoulder ABD      Lateral raise / shoulder abduction  TE   Wand shoulder press ups      Alternating dumbbell shoulder press > Alternating dumbbell [place]   TE   Bent over row    TE               A: Tolerated well  .  Pt now being treated for neck/ low back soreness/pain . Pt able to tolerate performing newly low back ex's as listed. Pt advised to bring cane next session for increase gait safety  . P: Continue with rehab plan.  Candie Francois PTA    Treatment Charges: Mins Units   Initial Evaluation     Re-Evaluation     Ther Exercise         TE 25 2   Manual Therapy     MT     Ther Activities        TA     Gait Training          GT     Neuro Re-education NR     Modalities/ ES  15 1   Non-Billable Service Time     Other     Total Time/Units 40 3

## 2022-12-08 ENCOUNTER — TREATMENT (OUTPATIENT)
Dept: PHYSICAL THERAPY | Age: 62
End: 2022-12-08

## 2022-12-08 DIAGNOSIS — M54.50 LEFT-SIDED LOW BACK PAIN WITHOUT SCIATICA, UNSPECIFIED CHRONICITY: Primary | ICD-10-CM

## 2022-12-08 DIAGNOSIS — M54.2 NECK PAIN: ICD-10-CM

## 2022-12-08 NOTE — PROGRESS NOTES
Physical Therapy Treatment Note    Date: 2022  Patient Name: Rogelio Merlin  : 1960   MRN: [id number]  DOInjury : 2022   DOSx:  none   Referring Provider: Rohit Henley DO  [address] [place] [address] Diagnosis:     1. Left-sided low back pain without sciatica, unspecified chronicity          2. Neck pain             Outcome Measure:      X = TO BE PERFORMED NEXT VISIT  > = PROGRESS TO THIS    S: Pt reports  continued neck, R shoulder pain, and low back now. Going to call ortho dr for shoulder  O: entered clinic without AD   Discussed anatomy, physiology, body mechanics, principles of loading, and progressive loading/activity. Reviewed home exercise program extensively; written copy provided. Time 5961-3466     Visit   Repeat outcome measure at mid point and end. Pain  6/10      ROM      Modalities Use sparingly if at all. Prefer an active program.     MH + ES to cervical / low back  X 15 min supine   Allowed to use/ p.  Pre ex's  MO   Traction    MO         Manual      Cervical soft tissue mobilization    MT   ROM         NR      TE      TE      TE      TE     To gain muscle control in slow movements     Exercise       Cervical AROM all motions  Rest periods as needed between reps  NR   SKTC X 10 new      Trunk rotations  X 10 new      Pelvic tilts  X 10 new            Quadruped neck retraction   NR   Cervical lateral flexion isometrics    NR   Cervical extension isometrics    NR         ROWS: H Green 3 x 15 seated   TE   ROWS: M  Reach and Pull Green 3 x 15 seated   TE   ROWS: L   Reach and Pull Green 3 x 15 seated   TE   Tubing Pushes   TE   Corebuilder    NR   Shoulder ER   TE   Shrugs  TE   Shoulder press ups       Wand shoulder flexion  TE   Wand shoulder ABD      Lateral raise / shoulder abduction  TE   Wand shoulder press ups      Alternating dumbbell shoulder press > Alternating dumbbell [place]   TE   Bent over row    TE               A: Tolerated well  . Pt now being treated for neck/ low back soreness/pain . Pt able to tolerate performing newly low back ex's as listed. Pt advised to bring cane next session for increase gait safety  . P: Continue with rehab plan.  Sophie Mcclellan PTA    Treatment Charges: Mins Units   Initial Evaluation     Re-Evaluation     Ther Exercise         TE     Manual Therapy     MT     Ther Activities        TA 40 3   Gait Training          GT     Neuro Re-education NR     Modalities/ ES  15 1   Non-Billable Service Time     Other     Total Time/Units 55 3

## 2022-12-14 ENCOUNTER — TREATMENT (OUTPATIENT)
Dept: PHYSICAL THERAPY | Age: 62
End: 2022-12-14

## 2022-12-14 DIAGNOSIS — M54.50 LEFT-SIDED LOW BACK PAIN WITHOUT SCIATICA, UNSPECIFIED CHRONICITY: Primary | ICD-10-CM

## 2022-12-14 DIAGNOSIS — M54.2 NECK PAIN: ICD-10-CM

## 2022-12-14 NOTE — PROGRESS NOTES
Physical Therapy Treatment Note    Date: 2022  Patient Name: James Weller  : 1960   MRN: [id number]  DOInjury : 2022   DOSx:  none   Referring Provider: Peyton Lawson DO  [address] [place] [address] Diagnosis:     1. Left-sided low back pain without sciatica, unspecified chronicity          2. Neck pain             Outcome Measure:      X = TO BE PERFORMED NEXT VISIT  > = PROGRESS TO THIS    S: Pt reports  continued neck, R shoulder pain, and low back now. Going to call ortho dr for shoulder  O: entered clinic without AD   Discussed anatomy, physiology, body mechanics, principles of loading, and progressive loading/activity. Reviewed home exercise program extensively; written copy provided. Time 0472-5442     Visit   Repeat outcome measure at mid point and end. Pain  6/10      ROM      Modalities Use sparingly if at all. Prefer an active program.     MH + ES to cervical / low back  X 15 min supine   Allowed to use/ post ex's .  MO   Traction    MO         Manual      Cervical soft tissue mobilization    MT   ROM         NR      TE      TE      TE      TE     To gain muscle control in slow movements     Exercise       Cervical AROM all motions  Rest periods as needed between reps  NR   SKTC X 10      Trunk rotations  X 10     Pelvic tilts  X 10            Quadruped neck retraction   NR   Cervical lateral flexion isometrics    NR   Cervical extension isometrics    NR         ROWS: H Green 3 x 15 seated   TA   ROWS: M  Reach and Pull Green 3 x 15 seated   TA   ROWS: L   Reach and Pull Green 3 x 15 seated   TA   Tubing Pushes   TE   Corebuilder    NR   Shoulder ER   TE   Shrugs  TE   Shoulder press ups       Wand shoulder flexion  TE   Wand shoulder ABD      Lateral raise / shoulder abduction  TE   Wand shoulder press ups      Alternating dumbbell shoulder press > Alternating dumbbell [place]   TE   Bent over row    TE               A: Tolerated  fair . Pt with return of previous c/o soreness/pain neck and low back . Pt unable to reach STG's/LTG's . Pt now ambulating using straight cane as advised for safety. P:  Last rx session of current script . Roly Yanes PTA    Treatment Charges: Mins Units   Initial Evaluation     Re-Evaluation     Ther Exercise         TE     Manual Therapy     MT     Ther Activities        TA 25 2   Gait Training          GT     Neuro Re-education NR     Modalities/ ES  15 1   Non-Billable Service Time     Other     Total Time/Units 40 3

## 2022-12-23 PROBLEM — M54.2 NECK PAIN: Status: ACTIVE | Noted: 2022-12-23

## 2022-12-23 PROBLEM — M54.50 LEFT-SIDED LOW BACK PAIN WITHOUT SCIATICA: Status: ACTIVE | Noted: 2022-12-23

## 2023-02-20 ENCOUNTER — APPOINTMENT (OUTPATIENT)
Dept: GENERAL RADIOLOGY | Age: 63
End: 2023-02-20
Payer: MEDICARE

## 2023-02-20 ENCOUNTER — HOSPITAL ENCOUNTER (EMERGENCY)
Age: 63
Discharge: HOME | End: 2023-02-20
Payer: MEDICARE

## 2023-02-20 VITALS
BODY MASS INDEX: 26.69 KG/M2 | HEART RATE: 76 BPM | DIASTOLIC BLOOD PRESSURE: 66 MMHG | OXYGEN SATURATION: 98 % | TEMPERATURE: 97.3 F | RESPIRATION RATE: 16 BRPM | SYSTOLIC BLOOD PRESSURE: 156 MMHG | WEIGHT: 186 LBS

## 2023-02-20 DIAGNOSIS — M25.561 ACUTE PAIN OF RIGHT KNEE: Primary | ICD-10-CM

## 2023-02-20 PROCEDURE — 73562 X-RAY EXAM OF KNEE 3: CPT

## 2023-02-20 PROCEDURE — 99284 EMERGENCY DEPT VISIT MOD MDM: CPT

## 2023-02-20 PROCEDURE — 6360000002 HC RX W HCPCS: Performed by: PHYSICIAN ASSISTANT

## 2023-02-20 PROCEDURE — 96372 THER/PROPH/DIAG INJ SC/IM: CPT

## 2023-02-20 RX ORDER — PREDNISONE 20 MG/1
TABLET ORAL
Qty: 18 TABLET | Refills: 0 | Status: SHIPPED | OUTPATIENT
Start: 2023-02-20 | End: 2023-03-02

## 2023-02-20 RX ORDER — DEXAMETHASONE SODIUM PHOSPHATE 10 MG/ML
10 INJECTION INTRAMUSCULAR; INTRAVENOUS ONCE
Status: COMPLETED | OUTPATIENT
Start: 2023-02-20 | End: 2023-02-20

## 2023-02-20 RX ORDER — KETOROLAC TROMETHAMINE 30 MG/ML
30 INJECTION, SOLUTION INTRAMUSCULAR; INTRAVENOUS ONCE
Status: COMPLETED | OUTPATIENT
Start: 2023-02-20 | End: 2023-02-20

## 2023-02-20 RX ADMIN — DEXAMETHASONE SODIUM PHOSPHATE 10 MG: 10 INJECTION INTRAMUSCULAR; INTRAVENOUS at 18:54

## 2023-02-20 RX ADMIN — KETOROLAC TROMETHAMINE 30 MG: 30 INJECTION, SOLUTION INTRAMUSCULAR at 18:54

## 2023-02-20 ASSESSMENT — LIFESTYLE VARIABLES: HOW MANY STANDARD DRINKS CONTAINING ALCOHOL DO YOU HAVE ON A TYPICAL DAY: PATIENT DOES NOT DRINK

## 2023-02-20 ASSESSMENT — PAIN DESCRIPTION - PAIN TYPE: TYPE: ACUTE PAIN

## 2023-02-20 ASSESSMENT — PAIN - FUNCTIONAL ASSESSMENT: PAIN_FUNCTIONAL_ASSESSMENT: 0-10

## 2023-02-20 ASSESSMENT — PAIN DESCRIPTION - LOCATION: LOCATION: KNEE

## 2023-02-20 ASSESSMENT — PAIN SCALES - GENERAL: PAINLEVEL_OUTOF10: 8

## 2023-02-20 NOTE — Clinical Note
Judson Javed was seen and treated in our emergency department on 2/20/2023. He may return to work on 02/21/2023. If you have any questions or concerns, please don't hesitate to call.       Annia Wolfe PA-C
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
## 2023-03-29 ENCOUNTER — ANESTHESIA EVENT (OUTPATIENT)
Dept: OPERATING ROOM | Age: 63
End: 2023-03-29
Payer: COMMERCIAL

## 2023-03-29 ENCOUNTER — HOSPITAL ENCOUNTER (OUTPATIENT)
Dept: PREADMISSION TESTING | Age: 63
Discharge: HOME | End: 2023-03-29
Payer: MEDICARE

## 2023-03-29 VITALS
BODY MASS INDEX: 26.05 KG/M2 | WEIGHT: 182 LBS | HEART RATE: 63 BPM | OXYGEN SATURATION: 97 % | DIASTOLIC BLOOD PRESSURE: 59 MMHG | HEIGHT: 70 IN | SYSTOLIC BLOOD PRESSURE: 125 MMHG | RESPIRATION RATE: 20 BRPM | TEMPERATURE: 97.2 F

## 2023-03-29 LAB
ANION GAP SERPL CALCULATED.3IONS-SCNC: 8 MMOL/L (ref 7–16)
BUN SERPL-MCNC: 13 MG/DL (ref 6–23)
CALCIUM SERPL-MCNC: 9 MG/DL (ref 8.6–10.2)
CHLORIDE SERPL-SCNC: 105 MMOL/L (ref 98–107)
CO2 SERPL-SCNC: 29 MMOL/L (ref 22–29)
CREAT SERPL-MCNC: 0.7 MG/DL (ref 0.7–1.2)
ERYTHROCYTE [DISTWIDTH] IN BLOOD BY AUTOMATED COUNT: 14 FL (ref 11.5–15)
GLUCOSE SERPL-MCNC: 92 MG/DL (ref 74–99)
HCT VFR BLD AUTO: 41.7 % (ref 37–54)
HGB BLD-MCNC: 14.2 G/DL (ref 12.5–16.5)
MCH RBC QN AUTO: 32 PG (ref 26–35)
MCHC RBC AUTO-ENTMCNC: 34.1 % (ref 32–34.5)
MCV RBC AUTO: 93.9 FL (ref 80–99.9)
PLATELET # BLD AUTO: 293 E9/L (ref 130–450)
PMV BLD AUTO: 10.7 FL (ref 7–12)
POTASSIUM SERPL-SCNC: 4.1 MMOL/L (ref 3.5–5)
RBC # BLD AUTO: 4.44 E12/L (ref 3.8–5.8)
SODIUM SERPL-SCNC: 142 MMOL/L (ref 132–146)
WBC # BLD: 4.2 E9/L (ref 4.5–11.5)

## 2023-03-29 PROCEDURE — 36415 COLL VENOUS BLD VENIPUNCTURE: CPT

## 2023-03-29 PROCEDURE — 87081 CULTURE SCREEN ONLY: CPT

## 2023-03-29 PROCEDURE — 85027 COMPLETE CBC AUTOMATED: CPT

## 2023-03-29 PROCEDURE — 80048 BASIC METABOLIC PNL TOTAL CA: CPT

## 2023-03-29 RX ORDER — ROPIVACAINE HYDROCHLORIDE 5 MG/ML
30 INJECTION, SOLUTION EPIDURAL; INFILTRATION; PERINEURAL ONCE
OUTPATIENT
Start: 2023-03-29 | End: 2023-03-29

## 2023-03-29 RX ORDER — MIDAZOLAM HYDROCHLORIDE 2 MG/2ML
1 INJECTION, SOLUTION INTRAMUSCULAR; INTRAVENOUS PRN
OUTPATIENT
Start: 2023-03-29

## 2023-03-29 RX ORDER — ACETAMINOPHEN 500 MG
500 TABLET ORAL EVERY 6 HOURS PRN
COMMUNITY

## 2023-03-29 NOTE — PROGRESS NOTES
contact lenses and dentures are not permitted into surgery (bring cases)      [] Please do not wear any nail polish, make up or hair products on the day of surgery    [x] You can expect a call the business day prior to procedure to notify you if your arrival time changes    [x] If you receive a survey after surgery we would greatly appreciate your comments    [] Parent/guardian of a minor must accompany their child and remain on the premises  the entire time they are under our care     [] Pediatric patients may bring favorite toy, blanket or comfort item with them    [] A caregiver or family member must remain with the patient during their stay if they are mentally handicapped, have dementia, disoriented or unable to use a call light or would be a safety concern if left unattended    [x] Please notify surgeon if you develop any illness between now and time of surgery (cold, cough, sore throat, fever, nausea, vomiting) or any signs of infections  including skin, wounds, and dental.    [x]  The Outpatient Pharm[facility] is available to fill your prescription here on your day of surgery, ask your preop nurse for details    [x] Other instructions: wear loose, comfortable clothing    EDUCATIONAL MATERIALS PROVIDED:    [x] PAT Preoperative Education Packet/Booklet     [x] Medication List    [] Transfusion bracelet applied with instructions    [x] Shower with soap, lather and rinse well, and use CHG wipes provided the evening before surgery as instructed    [x] Incentive spirometer with instructions

## 2023-03-31 LAB — MRSA SPEC QL CULT: NORMAL

## 2023-04-02 ASSESSMENT — COPD QUESTIONNAIRES: CAT_SEVERITY: MILD
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
## 2023-04-03 ENCOUNTER — HOSPITAL ENCOUNTER (OUTPATIENT)
Age: 63
Setting detail: OBSERVATION
Discharge: HOME | End: 2023-04-04
Attending: GENERAL ACUTE CARE HOSPITAL | Admitting: GENERAL ACUTE CARE HOSPITAL
Payer: COMMERCIAL

## 2023-04-03 ENCOUNTER — ANESTHESIA (OUTPATIENT)
Dept: OPERATING ROOM | Age: 63
End: 2023-04-03
Payer: COMMERCIAL

## 2023-04-03 ENCOUNTER — APPOINTMENT (OUTPATIENT)
Dept: GENERAL RADIOLOGY | Age: 63
End: 2023-04-03
Attending: GENERAL ACUTE CARE HOSPITAL
Payer: COMMERCIAL

## 2023-04-03 DIAGNOSIS — M19.011 ARTHRITIS OF RIGHT SHOULDER REGION: ICD-10-CM

## 2023-04-03 DIAGNOSIS — M75.121 COMPLETE TEAR OF RIGHT ROTATOR CUFF, UNSPECIFIED WHETHER TRAUMATIC: ICD-10-CM

## 2023-04-03 PROCEDURE — 6360000002 HC RX W HCPCS: Performed by: STUDENT IN AN ORGANIZED HEALTH CARE EDUCATION/TRAINING PROGRAM

## 2023-04-03 PROCEDURE — 2580000003 HC RX 258: Performed by: GENERAL ACUTE CARE HOSPITAL

## 2023-04-03 PROCEDURE — 2500000003 HC RX 250 WO HCPCS: Performed by: GENERAL ACUTE CARE HOSPITAL

## 2023-04-03 PROCEDURE — 2500000003 HC RX 250 WO HCPCS: Performed by: ANESTHESIOLOGY

## 2023-04-03 PROCEDURE — 2500000003 HC RX 250 WO HCPCS

## 2023-04-03 PROCEDURE — 3600000013 HC SURGERY LEVEL 3 ADDTL 15MIN: Performed by: GENERAL ACUTE CARE HOSPITAL

## 2023-04-03 PROCEDURE — G0378 HOSPITAL OBSERVATION PER HR: HCPCS

## 2023-04-03 PROCEDURE — 88311 DECALCIFY TISSUE: CPT

## 2023-04-03 PROCEDURE — 3600000003 HC SURGERY LEVEL 3 BASE: Performed by: GENERAL ACUTE CARE HOSPITAL

## 2023-04-03 PROCEDURE — 73030 X-RAY EXAM OF SHOULDER: CPT

## 2023-04-03 PROCEDURE — C1776 JOINT DEVICE (IMPLANTABLE): HCPCS | Performed by: GENERAL ACUTE CARE HOSPITAL

## 2023-04-03 PROCEDURE — 88304 TISSUE EXAM BY PATHOLOGIST: CPT

## 2023-04-03 PROCEDURE — 6360000002 HC RX W HCPCS: Performed by: GENERAL ACUTE CARE HOSPITAL

## 2023-04-03 PROCEDURE — 64415 NJX AA&/STRD BRCH PLXS IMG: CPT | Performed by: ANESTHESIOLOGY

## 2023-04-03 PROCEDURE — 7100000000 HC PACU RECOVERY - FIRST 15 MIN: Performed by: GENERAL ACUTE CARE HOSPITAL

## 2023-04-03 PROCEDURE — 3700000000 HC ANESTHESIA ATTENDED CARE: Performed by: GENERAL ACUTE CARE HOSPITAL

## 2023-04-03 PROCEDURE — 2720000010 HC SURG SUPPLY STERILE: Performed by: GENERAL ACUTE CARE HOSPITAL

## 2023-04-03 PROCEDURE — 6360000002 HC RX W HCPCS

## 2023-04-03 PROCEDURE — 2580000003 HC RX 258

## 2023-04-03 PROCEDURE — 6360000002 HC RX W HCPCS: Performed by: ANESTHESIOLOGY

## 2023-04-03 PROCEDURE — 3700000001 HC ADD 15 MINUTES (ANESTHESIA): Performed by: GENERAL ACUTE CARE HOSPITAL

## 2023-04-03 PROCEDURE — A4216 STERILE WATER/SALINE, 10 ML: HCPCS | Performed by: ANESTHESIOLOGY

## 2023-04-03 PROCEDURE — 6370000000 HC RX 637 (ALT 250 FOR IP): Performed by: ANESTHESIOLOGY

## 2023-04-03 PROCEDURE — 7100000001 HC PACU RECOVERY - ADDTL 15 MIN: Performed by: GENERAL ACUTE CARE HOSPITAL

## 2023-04-03 PROCEDURE — 2709999900 HC NON-CHARGEABLE SUPPLY: Performed by: GENERAL ACUTE CARE HOSPITAL

## 2023-04-03 PROCEDURE — 2580000003 HC RX 258: Performed by: ANESTHESIOLOGY

## 2023-04-03 PROCEDURE — 6370000000 HC RX 637 (ALT 250 FOR IP): Performed by: GENERAL ACUTE CARE HOSPITAL

## 2023-04-03 DEVICE — IMPLANTABLE DEVICE
Type: IMPLANTABLE DEVICE | Site: SHOULDER | Status: FUNCTIONAL
Brand: COMPREHENSIVE® SHOULDER SYSTEM

## 2023-04-03 DEVICE — IMPLANTABLE DEVICE
Type: IMPLANTABLE DEVICE | Site: SHOULDER | Status: FUNCTIONAL
Brand: COMPREHENSIVE® REVERSE SHOULDER

## 2023-04-03 DEVICE — IMPLANTABLE DEVICE
Type: IMPLANTABLE DEVICE | Site: SHOULDER | Status: FUNCTIONAL
Brand: COMPREHENSIVE®

## 2023-04-03 DEVICE — IMPLANTABLE DEVICE
Type: IMPLANTABLE DEVICE | Site: SHOULDER | Status: FUNCTIONAL
Brand: COMPREHENSIVE REVERSE SHOULDER

## 2023-04-03 DEVICE — IMPLANTABLE DEVICE: Type: IMPLANTABLE DEVICE | Site: SHOULDER | Status: FUNCTIONAL

## 2023-04-03 DEVICE — IMPLANTABLE DEVICE
Type: IMPLANTABLE DEVICE | Site: SHOULDER | Status: FUNCTIONAL
Brand: COMPREHENSIVE® PROLONG®

## 2023-04-03 RX ORDER — PANTOPRAZOLE SODIUM 40 MG/1
40 TABLET, DELAYED RELEASE ORAL
Status: DISCONTINUED | OUTPATIENT
Start: 2023-04-04 | End: 2023-04-04 | Stop reason: HOSPADM

## 2023-04-03 RX ORDER — POLYETHYLENE GLYCOL 3350 17 G/17G
17 POWDER, FOR SOLUTION ORAL DAILY
Status: DISCONTINUED | OUTPATIENT
Start: 2023-04-03 | End: 2023-04-04 | Stop reason: HOSPADM

## 2023-04-03 RX ORDER — LIDOCAINE HYDROCHLORIDE 20 MG/ML
INJECTION, SOLUTION EPIDURAL; INFILTRATION; INTRACAUDAL; PERINEURAL PRN
Status: DISCONTINUED | OUTPATIENT
Start: 2023-04-03 | End: 2023-04-03 | Stop reason: SDUPTHER

## 2023-04-03 RX ORDER — SODIUM CHLORIDE 0.9 % (FLUSH) 0.9 %
5-40 SYRINGE (ML) INJECTION PRN
Status: DISCONTINUED | OUTPATIENT
Start: 2023-04-03 | End: 2023-04-03 | Stop reason: HOSPADM

## 2023-04-03 RX ORDER — ONDANSETRON 2 MG/ML
4 INJECTION INTRAMUSCULAR; INTRAVENOUS EVERY 6 HOURS PRN
Status: DISCONTINUED | OUTPATIENT
Start: 2023-04-03 | End: 2023-04-04 | Stop reason: HOSPADM

## 2023-04-03 RX ORDER — KETOROLAC TROMETHAMINE 30 MG/ML
15 INJECTION, SOLUTION INTRAMUSCULAR; INTRAVENOUS ONCE
Status: DISCONTINUED | OUTPATIENT
Start: 2023-04-03 | End: 2023-04-03 | Stop reason: HOSPADM

## 2023-04-03 RX ORDER — MIDAZOLAM HYDROCHLORIDE 2 MG/2ML
2 INJECTION, SOLUTION INTRAMUSCULAR; INTRAVENOUS
Status: DISCONTINUED | OUTPATIENT
Start: 2023-04-03 | End: 2023-04-03 | Stop reason: HOSPADM

## 2023-04-03 RX ORDER — FENTANYL CITRATE 50 UG/ML
INJECTION, SOLUTION INTRAMUSCULAR; INTRAVENOUS PRN
Status: DISCONTINUED | OUTPATIENT
Start: 2023-04-03 | End: 2023-04-03 | Stop reason: SDUPTHER

## 2023-04-03 RX ORDER — ASPIRIN 81 MG/1
325 TABLET ORAL DAILY
Status: DISCONTINUED | OUTPATIENT
Start: 2023-04-03 | End: 2023-04-03 | Stop reason: CLARIF

## 2023-04-03 RX ORDER — OXYCODONE HYDROCHLORIDE 5 MG/1
10 TABLET ORAL EVERY 4 HOURS PRN
Status: DISCONTINUED | OUTPATIENT
Start: 2023-04-03 | End: 2023-04-04 | Stop reason: HOSPADM

## 2023-04-03 RX ORDER — LABETALOL HYDROCHLORIDE 5 MG/ML
INJECTION, SOLUTION INTRAVENOUS PRN
Status: DISCONTINUED | OUTPATIENT
Start: 2023-04-03 | End: 2023-04-03 | Stop reason: SDUPTHER

## 2023-04-03 RX ORDER — ROCURONIUM BROMIDE 10 MG/ML
INJECTION, SOLUTION INTRAVENOUS PRN
Status: DISCONTINUED | OUTPATIENT
Start: 2023-04-03 | End: 2023-04-03 | Stop reason: SDUPTHER

## 2023-04-03 RX ORDER — OXYCODONE HYDROCHLORIDE 5 MG/1
5 TABLET ORAL EVERY 4 HOURS PRN
Status: DISCONTINUED | OUTPATIENT
Start: 2023-04-03 | End: 2023-04-04 | Stop reason: HOSPADM

## 2023-04-03 RX ORDER — PROCHLORPERAZINE EDISYLATE 5 MG/ML
5 INJECTION INTRAMUSCULAR; INTRAVENOUS
Status: DISCONTINUED | OUTPATIENT
Start: 2023-04-03 | End: 2023-04-03 | Stop reason: HOSPADM

## 2023-04-03 RX ORDER — PHENYTOIN SODIUM 100 MG/1
100 CAPSULE, EXTENDED RELEASE ORAL 3 TIMES DAILY
Status: DISCONTINUED | OUTPATIENT
Start: 2023-04-03 | End: 2023-04-04 | Stop reason: HOSPADM

## 2023-04-03 RX ORDER — SODIUM CHLORIDE 0.9 % (FLUSH) 0.9 %
5-40 SYRINGE (ML) INJECTION EVERY 12 HOURS SCHEDULED
Status: DISCONTINUED | OUTPATIENT
Start: 2023-04-03 | End: 2023-04-04 | Stop reason: HOSPADM

## 2023-04-03 RX ORDER — MORPHINE SULFATE 2 MG/ML
2 INJECTION, SOLUTION INTRAMUSCULAR; INTRAVENOUS
Status: DISCONTINUED | OUTPATIENT
Start: 2023-04-03 | End: 2023-04-04 | Stop reason: HOSPADM

## 2023-04-03 RX ORDER — PROPOFOL 10 MG/ML
INJECTION, EMULSION INTRAVENOUS PRN
Status: DISCONTINUED | OUTPATIENT
Start: 2023-04-03 | End: 2023-04-03 | Stop reason: SDUPTHER

## 2023-04-03 RX ORDER — SODIUM CHLORIDE 0.9 % (FLUSH) 0.9 %
5-40 SYRINGE (ML) INJECTION EVERY 12 HOURS SCHEDULED
Status: DISCONTINUED | OUTPATIENT
Start: 2023-04-03 | End: 2023-04-03 | Stop reason: HOSPADM

## 2023-04-03 RX ORDER — ONDANSETRON 4 MG/1
4 TABLET, ORALLY DISINTEGRATING ORAL EVERY 8 HOURS PRN
Status: DISCONTINUED | OUTPATIENT
Start: 2023-04-03 | End: 2023-04-04 | Stop reason: HOSPADM

## 2023-04-03 RX ORDER — HYDRALAZINE HYDROCHLORIDE 20 MG/ML
10 INJECTION INTRAMUSCULAR; INTRAVENOUS
Status: DISCONTINUED | OUTPATIENT
Start: 2023-04-03 | End: 2023-04-03 | Stop reason: HOSPADM

## 2023-04-03 RX ORDER — DEXAMETHASONE SODIUM PHOSPHATE 4 MG/ML
INJECTION, SOLUTION INTRA-ARTICULAR; INTRALESIONAL; INTRAMUSCULAR; INTRAVENOUS; SOFT TISSUE PRN
Status: DISCONTINUED | OUTPATIENT
Start: 2023-04-03 | End: 2023-04-03 | Stop reason: SDUPTHER

## 2023-04-03 RX ORDER — ROPIVACAINE HYDROCHLORIDE 5 MG/ML
INJECTION, SOLUTION EPIDURAL; INFILTRATION; PERINEURAL
Status: COMPLETED | OUTPATIENT
Start: 2023-04-03 | End: 2023-04-03

## 2023-04-03 RX ORDER — SODIUM CHLORIDE 0.9 % (FLUSH) 0.9 %
5-40 SYRINGE (ML) INJECTION PRN
Status: DISCONTINUED | OUTPATIENT
Start: 2023-04-03 | End: 2023-04-04 | Stop reason: HOSPADM

## 2023-04-03 RX ORDER — MEPERIDINE HYDROCHLORIDE 25 MG/ML
12.5 INJECTION INTRAMUSCULAR; INTRAVENOUS; SUBCUTANEOUS EVERY 5 MIN PRN
Status: DISCONTINUED | OUTPATIENT
Start: 2023-04-03 | End: 2023-04-03 | Stop reason: HOSPADM

## 2023-04-03 RX ORDER — LABETALOL HYDROCHLORIDE 5 MG/ML
10 INJECTION, SOLUTION INTRAVENOUS
Status: DISCONTINUED | OUTPATIENT
Start: 2023-04-03 | End: 2023-04-03 | Stop reason: HOSPADM

## 2023-04-03 RX ORDER — SODIUM CHLORIDE 9 MG/ML
INJECTION, SOLUTION INTRAVENOUS PRN
Status: DISCONTINUED | OUTPATIENT
Start: 2023-04-03 | End: 2023-04-03 | Stop reason: HOSPADM

## 2023-04-03 RX ORDER — SODIUM CHLORIDE, SODIUM LACTATE, POTASSIUM CHLORIDE, CALCIUM CHLORIDE 600; 310; 30; 20 MG/100ML; MG/100ML; MG/100ML; MG/100ML
INJECTION, SOLUTION INTRAVENOUS CONTINUOUS PRN
Status: DISCONTINUED | OUTPATIENT
Start: 2023-04-03 | End: 2023-04-03 | Stop reason: SDUPTHER

## 2023-04-03 RX ORDER — HALOPERIDOL 5 MG/ML
1 INJECTION INTRAMUSCULAR
Status: DISCONTINUED | OUTPATIENT
Start: 2023-04-03 | End: 2023-04-03 | Stop reason: HOSPADM

## 2023-04-03 RX ORDER — MIDAZOLAM HYDROCHLORIDE 2 MG/2ML
1 INJECTION, SOLUTION INTRAMUSCULAR; INTRAVENOUS PRN
Status: DISCONTINUED | OUTPATIENT
Start: 2023-04-03 | End: 2023-04-03 | Stop reason: HOSPADM

## 2023-04-03 RX ORDER — LIDOCAINE HYDROCHLORIDE 10 MG/ML
5 INJECTION, SOLUTION EPIDURAL; INFILTRATION; INTRACAUDAL; PERINEURAL ONCE
Status: DISCONTINUED | OUTPATIENT
Start: 2023-04-03 | End: 2023-04-03 | Stop reason: HOSPADM

## 2023-04-03 RX ORDER — ACETAMINOPHEN 325 MG/1
650 TABLET ORAL EVERY 6 HOURS
Status: DISCONTINUED | OUTPATIENT
Start: 2023-04-03 | End: 2023-04-04 | Stop reason: HOSPADM

## 2023-04-03 RX ORDER — SCOLOPAMINE TRANSDERMAL SYSTEM 1 MG/1
1 PATCH, EXTENDED RELEASE TRANSDERMAL ONCE
Status: DISCONTINUED | OUTPATIENT
Start: 2023-04-03 | End: 2023-04-04 | Stop reason: HOSPADM

## 2023-04-03 RX ORDER — LIDOCAINE HYDROCHLORIDE 10 MG/ML
INJECTION, SOLUTION INFILTRATION; PERINEURAL
Status: COMPLETED
Start: 2023-04-03 | End: 2023-04-03

## 2023-04-03 RX ORDER — FENTANYL CITRATE 50 UG/ML
100 INJECTION, SOLUTION INTRAMUSCULAR; INTRAVENOUS ONCE
Status: COMPLETED | OUTPATIENT
Start: 2023-04-03 | End: 2023-04-03

## 2023-04-03 RX ORDER — OXYCODONE HYDROCHLORIDE 5 MG/1
5 TABLET ORAL
Status: DISCONTINUED | OUTPATIENT
Start: 2023-04-03 | End: 2023-04-03 | Stop reason: HOSPADM

## 2023-04-03 RX ORDER — KETOROLAC TROMETHAMINE 30 MG/ML
INJECTION, SOLUTION INTRAMUSCULAR; INTRAVENOUS PRN
Status: DISCONTINUED | OUTPATIENT
Start: 2023-04-03 | End: 2023-04-03 | Stop reason: SDUPTHER

## 2023-04-03 RX ORDER — ONDANSETRON 2 MG/ML
INJECTION INTRAMUSCULAR; INTRAVENOUS PRN
Status: DISCONTINUED | OUTPATIENT
Start: 2023-04-03 | End: 2023-04-03 | Stop reason: SDUPTHER

## 2023-04-03 RX ORDER — IPRATROPIUM BROMIDE AND ALBUTEROL SULFATE 2.5; .5 MG/3ML; MG/3ML
1 SOLUTION RESPIRATORY (INHALATION)
Status: DISCONTINUED | OUTPATIENT
Start: 2023-04-03 | End: 2023-04-03 | Stop reason: HOSPADM

## 2023-04-03 RX ORDER — VANCOMYCIN HYDROCHLORIDE 1 G/20ML
INJECTION, POWDER, LYOPHILIZED, FOR SOLUTION INTRAVENOUS PRN
Status: DISCONTINUED | OUTPATIENT
Start: 2023-04-03 | End: 2023-04-03 | Stop reason: ALTCHOICE

## 2023-04-03 RX ORDER — SODIUM CHLORIDE 9 MG/ML
INJECTION, SOLUTION INTRAVENOUS CONTINUOUS
Status: DISCONTINUED | OUTPATIENT
Start: 2023-04-03 | End: 2023-04-04 | Stop reason: HOSPADM

## 2023-04-03 RX ORDER — SODIUM CHLORIDE 9 MG/ML
INJECTION, SOLUTION INTRAVENOUS PRN
Status: DISCONTINUED | OUTPATIENT
Start: 2023-04-03 | End: 2023-04-04 | Stop reason: HOSPADM

## 2023-04-03 RX ORDER — MORPHINE SULFATE 4 MG/ML
4 INJECTION, SOLUTION INTRAMUSCULAR; INTRAVENOUS
Status: DISCONTINUED | OUTPATIENT
Start: 2023-04-03 | End: 2023-04-04 | Stop reason: HOSPADM

## 2023-04-03 RX ORDER — ROPIVACAINE HYDROCHLORIDE 5 MG/ML
30 INJECTION, SOLUTION EPIDURAL; INFILTRATION; PERINEURAL ONCE
Status: DISCONTINUED | OUTPATIENT
Start: 2023-04-03 | End: 2023-04-03 | Stop reason: HOSPADM

## 2023-04-03 RX ORDER — DIPHENHYDRAMINE HYDROCHLORIDE 50 MG/ML
12.5 INJECTION INTRAMUSCULAR; INTRAVENOUS
Status: DISCONTINUED | OUTPATIENT
Start: 2023-04-03 | End: 2023-04-03 | Stop reason: HOSPADM

## 2023-04-03 RX ADMIN — FENTANYL CITRATE 50 MCG: 50 INJECTION, SOLUTION INTRAMUSCULAR; INTRAVENOUS at 10:08

## 2023-04-03 RX ADMIN — SODIUM CHLORIDE, PRESERVATIVE FREE 10 ML: 5 INJECTION INTRAVENOUS at 13:51

## 2023-04-03 RX ADMIN — ROCURONIUM BROMIDE 50 MG: 10 INJECTION, SOLUTION INTRAVENOUS at 10:08

## 2023-04-03 RX ADMIN — OXYCODONE 10 MG: 5 TABLET ORAL at 22:18

## 2023-04-03 RX ADMIN — ONDANSETRON 4 MG: 2 INJECTION INTRAMUSCULAR; INTRAVENOUS at 14:58

## 2023-04-03 RX ADMIN — ONDANSETRON 4 MG: 2 INJECTION INTRAMUSCULAR; INTRAVENOUS at 12:19

## 2023-04-03 RX ADMIN — ROPIVACAINE HYDROCHLORIDE 30 ML: 5 INJECTION, SOLUTION EPIDURAL; INFILTRATION; PERINEURAL at 08:57

## 2023-04-03 RX ADMIN — SODIUM CHLORIDE: 9 INJECTION, SOLUTION INTRAVENOUS at 14:30

## 2023-04-03 RX ADMIN — SODIUM CHLORIDE, POTASSIUM CHLORIDE, SODIUM LACTATE AND CALCIUM CHLORIDE: 600; 310; 30; 20 INJECTION, SOLUTION INTRAVENOUS at 10:04

## 2023-04-03 RX ADMIN — WATER 2000 MG: 1 INJECTION INTRAMUSCULAR; INTRAVENOUS; SUBCUTANEOUS at 17:55

## 2023-04-03 RX ADMIN — TRANEXAMIC ACID 1000 MG: 100 INJECTION, SOLUTION INTRAVENOUS at 13:24

## 2023-04-03 RX ADMIN — TRANEXAMIC ACID 1000 MG: 100 INJECTION, SOLUTION INTRAVENOUS at 10:25

## 2023-04-03 RX ADMIN — FENTANYL CITRATE 50 MCG: 50 INJECTION, SOLUTION INTRAMUSCULAR; INTRAVENOUS at 11:41

## 2023-04-03 RX ADMIN — WATER 2000 MG: 1 INJECTION INTRAMUSCULAR; INTRAVENOUS; SUBCUTANEOUS at 10:17

## 2023-04-03 RX ADMIN — ROCURONIUM BROMIDE 25 MG: 10 INJECTION, SOLUTION INTRAVENOUS at 11:41

## 2023-04-03 RX ADMIN — ROCURONIUM BROMIDE 25 MG: 10 INJECTION, SOLUTION INTRAVENOUS at 10:36

## 2023-04-03 RX ADMIN — OXYCODONE 5 MG: 5 TABLET ORAL at 16:38

## 2023-04-03 RX ADMIN — SODIUM CHLORIDE, POTASSIUM CHLORIDE, SODIUM LACTATE AND CALCIUM CHLORIDE: 600; 310; 30; 20 INJECTION, SOLUTION INTRAVENOUS at 10:40

## 2023-04-03 RX ADMIN — PROPOFOL 200 MG: 10 INJECTION, EMULSION INTRAVENOUS at 10:08

## 2023-04-03 RX ADMIN — PHENYTOIN SODIUM 100 MG: 100 CAPSULE ORAL at 22:19

## 2023-04-03 RX ADMIN — FAMOTIDINE 20 MG: 10 INJECTION, SOLUTION INTRAVENOUS at 09:32

## 2023-04-03 RX ADMIN — DEXAMETHASONE SODIUM PHOSPHATE 10 MG: 4 INJECTION, SOLUTION INTRAMUSCULAR; INTRAVENOUS at 10:08

## 2023-04-03 RX ADMIN — KETOROLAC TROMETHAMINE 30 MG: 30 INJECTION, SOLUTION INTRAMUSCULAR; INTRAVENOUS at 12:20

## 2023-04-03 RX ADMIN — FENTANYL CITRATE 50 MCG: 50 INJECTION INTRAMUSCULAR; INTRAVENOUS at 08:58

## 2023-04-03 RX ADMIN — MIDAZOLAM HYDROCHLORIDE 1 MG: 1 INJECTION, SOLUTION INTRAMUSCULAR; INTRAVENOUS at 08:58

## 2023-04-03 RX ADMIN — LABETALOL HYDROCHLORIDE 5 MG: 5 INJECTION INTRAVENOUS at 12:09

## 2023-04-03 RX ADMIN — ACETAMINOPHEN 650 MG: 325 TABLET ORAL at 22:19

## 2023-04-03 RX ADMIN — SUGAMMADEX 300 MG: 100 INJECTION, SOLUTION INTRAVENOUS at 12:49

## 2023-04-03 RX ADMIN — LIDOCAINE HYDROCHLORIDE 100 MG: 20 INJECTION, SOLUTION EPIDURAL; INFILTRATION; INTRACAUDAL; PERINEURAL at 10:08

## 2023-04-03 RX ADMIN — LIDOCAINE HYDROCHLORIDE 2 ML: 10 INJECTION, SOLUTION INFILTRATION; PERINEURAL at 09:03

## 2023-04-03 ASSESSMENT — PAIN DESCRIPTION - LOCATION
LOCATION: NECK
LOCATION: NECK;SHOULDER

## 2023-04-03 ASSESSMENT — PAIN DESCRIPTION - ORIENTATION: ORIENTATION: RIGHT

## 2023-04-03 ASSESSMENT — PAIN SCALES - GENERAL
PAINLEVEL_OUTOF10: 5
PAINLEVEL_OUTOF10: 10
PAINLEVEL_OUTOF10: 7

## 2023-04-03 ASSESSMENT — PAIN - FUNCTIONAL ASSESSMENT: PAIN_FUNCTIONAL_ASSESSMENT: 0-10

## 2023-04-03 ASSESSMENT — PAIN DESCRIPTION - DESCRIPTORS: DESCRIPTORS: DISCOMFORT;SORE;ACHING

## 2023-04-03 NOTE — ANESTHESIA POSTPROCEDURE EVALUATION
Department of Anesthesiology  Postprocedure Note    Patient: Rogelio Merlin  MRN: [id number]  YOB: 1960  Date of evaluation: 4/3/2023      Procedure Summary     Date: 04/03/23 Room / Location: [facility] OR 02 / [facility]    Anesthesia Start: 1004 Anesthesia Stop: 1301    Procedure: RIGHT TOTAL SHOULDER  REVERSE ARTHROPLASTY ++LAUREL++ ++PNB++ (Right: Shoulder) Diagnosis:       Arthritis of right shoulder region      Complete tear of right rotator cuff, unspecified whether traumatic      (Arthritis of right shoulder region [M19.011])      (Complete tear of right rotator cuff, unspecified whether traumatic [M75.121])    Surgeons: Wilfredo Manjarrez DO Responsible Provider: Duran German MD    Anesthesia Type: general, regional ASA Status: 3          Anesthesia Type: No value filed.     Annalise Phase I: Annalise Score: 10    Annalise Phase II:        Anesthesia Post Evaluation    Patient location during evaluation: PACU  Patient participation: complete - patient participated  Level of consciousness: sleepy but conscious  Pain score: 0  Airway patency: patent  Nausea & Vomiting: no nausea and no vomiting  Complications: no  Cardiovascular status: hemodynamically stable  Respiratory status: acceptable  Hydration status: euvolemic
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
## 2023-04-03 NOTE — ANESTHESIA PROCEDURE NOTES
Peripheral Block    Patient location during procedure: procedure area  Reason for block: post-op pain management and at surgeon's request  Start time: 4/3/2023 8:57 AM  End time: 4/3/2023 9:10 AM  Staffing  Performed: anesthesiologist   Anesthesiologist: Alayna Lacey MD  Preanesthetic Checklist  Completed: patient identified, IV checked, site marked, risks and benefits discussed, surgical/procedural consents, equipment checked, pre-op evaluation, timeout performed, anesthesia consent given, oxygen available, monitors applied/VS acknowledged, fire risk safety assessment completed and verbalized and blood product R/B/A discussed and consented  Peripheral Block   Patient position: supine  Prep: ChloraPrep  Provider prep: mask and sterile gloves  Patient monitoring: cardiac monitor, continuous pulse ox, frequent blood pressure checks, IV access, oxygen and responsive to questions  Block type: Brachial plexus  Interscalene  Laterality: right  Injection technique: single-shot  Guidance: ultrasound guided  Local infiltration: lidocaine  Infiltration strength: 1 %  Local infiltration: lidocaine  Dose: 1 mL    Needle   Needle type: insulated echogenic nerve stimulator needle   Needle gauge: 22 G  Needle localization: ultrasound guidance  Needle length: 5 cm  Assessment   Injection assessment: negative aspiration for heme, no paresthesia on injection, local visualized surrounding nerve on ultrasound and no intravascular symptoms  Paresthesia pain: none  Slow fractionated injection: yes  Hemodynamics: stable  Real-time US image taken/store: yes  Outcomes: uncomplicated and patient tolerated procedure well    Medications Administered  ropivacaine (NAROPIN) injection 0.5% - Perineural   30 mL - 4/3/2023 8:57:00 AM

## 2023-04-03 NOTE — OP NOTE
Operative Note      Patient: Aliyah Storm  YOB: 1960  MRN: [id number]    Date of Procedure: 4/3/2023    Pre-Op Diagnosis: Arthritis of right shoulder region [M19.011]  Complete tear of right rotator cuff, unspecified whether traumatic [M75.121]    Post-Op Diagnosis: Same       Procedure(s):  RIGHT TOTAL SHOULDER  REVERSE ARTHROPLASTY ++LAUREL++ ++PNB++    Surgeon(s):  Megan Villalta DO    Assistant:   First Assistant: Marissa Patel    Anesthesia: General    Estimated Blood Loss (mL): less than 871     Complications: None    Specimens:   ID Type Source Tests Collected by Time Destination   A : RIGHT HUMERAL HEAD Bone Shoulder SURGICAL PATHOLOGY Megan Villalta DO 4/3/2023 1059        Implants:  Implant Name Type Inv. Item Serial No.  Lot No. LRB No. Used Action   BASEPLATE JARAD [id number] MINI SHLDR REV TAPR COMPHSVE - [id number]  BASEPLATE JARAD [id number] MINI SHLDR REV TAPR [employer] [employer] [id number] Right 1 Implanted   SCREW BNE L25MM DIA6. 5MM HEX HD DIA3. 5MM FOR COMPHSVE CONV - [id number]  SCREW BNE L25MM DIA6. 5MM HEX HD DIA3. 5MM FOR COMPHSVE CONV  LAUREL [employer]- [id number] Right 1 Implanted   SPHERE JARAD [id number] +3MM OFFSET CO CHROM SHLDR REV SYS - [id number]  SPHERE JARAD [id number] +3MM OFFSET CO CHROM SHLDR REV SYS  [employer]URE[employer]- [id number] Right 1 Implanted   SCREW BNE L15MM DIA4. 75MM HD DIA3.5MM TORIE FIX ANG - [id number]  SCREW BNE L15MM DIA4. 75MM HD DIA3.5MM TORIE FIX ANG  LAUREL [employer]- [id number] Right 1 Implanted   SCREW BNE L25MM DIA4. 75MM HD DIA3.5MM TORIE FIX ANG - [id number]  SCREW BNE L25MM DIA4. 75MM HD DIA3.5MM TORIE FIX ANG  [employer] [id number] Right 1 Implanted   SCREW BNE L25MM DIA4. 75MM HD DIA3.5MM TORIE FIX ANG - [id number]  SCREW BNE L25MM DIA4. 75MM HD DIA3.5MM TORIE FIX ANG  [employer]URE[employer]- [id number] Right 1 Implanted   SCREW BNE L15MM DIA4. 75MM HD DIA3.5MM TORIE FIX ANG - [id number]

## 2023-04-03 NOTE — PLAN OF CARE
Problem: Discharge Planning  Goal: Discharge to home or other facility with appropriate resources  Outcome: Progressing  Flowsheets (Taken 4/3/2023 1350)  Discharge to home or other facility with appropriate resources: Identify barriers to discharge with patient and caregiver     Problem: Pain  Goal: Verbalizes/displays adequate comfort level or baseline comfort level  Outcome: Progressing

## 2023-04-03 NOTE — CARE COORDINATION
Met with patient and wife, Rohini Hernandez about diagnosis and discharge plan of care. Post op right total shoulder reverse arthoplasty. Pt lives with spouse in 2nd floor apt. Ortho wants home care-choices given and referral made to [address] [place] [address]. Plan is home. Will follow-[facility]    Case Management Assessment  Initial Evaluation    Date/Time of Evaluation: 4/3/2023 3:57 PM  Assessment Completed by: Verdell Fabry, RN    If patient is discharged prior to next notation, then this note serves as note for discharge by case management. Patient Name: Massiel Fierro                   YOB: 1960  Diagnosis: Arthritis of right shoulder region [M19.011]  Complete tear of right rotator cuff, unspecified whether traumatic [M75.121]                   Date / Time: 4/3/2023  7:11 AM    Patient Admission Status: Observation   Readmission Risk (Low < 19, Mod (19-27), High > 27): No data recorded  Current PCP: Tawanda Leung, DO  PCP verified by ? Yes    Chart Reviewed: Yes      History Provided by: Patient  Patient Orientation: Alert and Oriented, Person, Place, Situation, Self    Patient Cognition: Alert    Hospitalization in the last 30 days (Readmission):  No    If yes, Readmission Assessment in  Navigator will be completed. Advance Directives:      Code Status: Full Code   Patient's Primary Decision Maker is:      Primary Decision Maker: Ninfa Prado - Brother/Sister - [phone]    Secondary Decision Maker: Haven Ray - Brother/Sister - [phone]    Discharge Planning:    Patient lives with: Spouse/Significant Other Type of Home: Apartment  Primary Care Giver: Self  Patient Support Systems include: Spouse/Significant Other   Current Financial resources:    Current community resources:    Current services prior to admission: None            Current DME:              Type of Home Care services:  None    ADLS  Prior functional level:  Independent in ADLs/IADLs  Current functional level: Assistance with the

## 2023-04-03 NOTE — PROGRESS NOTES
Right interscalene block completed per Dr Shahla Mohamud with good toleration. Call light within reach. Wife called to bedside.

## 2023-04-04 VITALS
DIASTOLIC BLOOD PRESSURE: 70 MMHG | RESPIRATION RATE: 16 BRPM | OXYGEN SATURATION: 98 % | HEART RATE: 86 BPM | TEMPERATURE: 97.8 F | SYSTOLIC BLOOD PRESSURE: 110 MMHG

## 2023-04-04 LAB
BASOPHILS # BLD: 0.01 E9/L (ref 0–0.2)
BASOPHILS NFR BLD: 0.1 % (ref 0–2)
EOSINOPHIL # BLD: 0.01 E9/L (ref 0.05–0.5)
EOSINOPHIL NFR BLD: 0.1 % (ref 0–6)
ERYTHROCYTE [DISTWIDTH] IN BLOOD BY AUTOMATED COUNT: 14.4 FL (ref 11.5–15)
HCT VFR BLD AUTO: 35 % (ref 37–54)
HGB BLD-MCNC: 11.9 G/DL (ref 12.5–16.5)
IMM GRANULOCYTES # BLD: 0.04 E9/L
IMM GRANULOCYTES NFR BLD: 0.4 % (ref 0–5)
LYMPHOCYTES # BLD: 1.53 E9/L (ref 1.5–4)
LYMPHOCYTES NFR BLD: 16.2 % (ref 20–42)
MCH RBC QN AUTO: 32.8 PG (ref 26–35)
MCHC RBC AUTO-ENTMCNC: 34 % (ref 32–34.5)
MCV RBC AUTO: 96.4 FL (ref 80–99.9)
MONOCYTES # BLD: 1.35 E9/L (ref 0.1–0.95)
MONOCYTES NFR BLD: 14.3 % (ref 2–12)
NEUTROPHILS # BLD: 6.53 E9/L (ref 1.8–7.3)
NEUTS SEG NFR BLD: 68.9 % (ref 43–80)
PLATELET # BLD AUTO: 209 E9/L (ref 130–450)
PMV BLD AUTO: 11.2 FL (ref 7–12)
RBC # BLD AUTO: 3.63 E12/L (ref 3.8–5.8)
WBC # BLD: 9.5 E9/L (ref 4.5–11.5)

## 2023-04-04 PROCEDURE — 97165 OT EVAL LOW COMPLEX 30 MIN: CPT

## 2023-04-04 PROCEDURE — 6370000000 HC RX 637 (ALT 250 FOR IP): Performed by: GENERAL ACUTE CARE HOSPITAL

## 2023-04-04 PROCEDURE — 96374 THER/PROPH/DIAG INJ IV PUSH: CPT

## 2023-04-04 PROCEDURE — 36415 COLL VENOUS BLD VENIPUNCTURE: CPT

## 2023-04-04 PROCEDURE — 2580000003 HC RX 258: Performed by: GENERAL ACUTE CARE HOSPITAL

## 2023-04-04 PROCEDURE — G0378 HOSPITAL OBSERVATION PER HR: HCPCS

## 2023-04-04 PROCEDURE — 6360000002 HC RX W HCPCS: Performed by: GENERAL ACUTE CARE HOSPITAL

## 2023-04-04 PROCEDURE — 85025 COMPLETE CBC W/AUTO DIFF WBC: CPT

## 2023-04-04 RX ORDER — OXYCODONE HYDROCHLORIDE 5 MG/1
5 TABLET ORAL EVERY 4 HOURS PRN
Qty: 30 TABLET | Refills: 0 | Status: SHIPPED | OUTPATIENT
Start: 2023-04-04 | End: 2023-04-07

## 2023-04-04 RX ADMIN — MORPHINE SULFATE 4 MG: 4 INJECTION, SOLUTION INTRAMUSCULAR; INTRAVENOUS at 05:01

## 2023-04-04 RX ADMIN — ACETAMINOPHEN 650 MG: 325 TABLET ORAL at 05:02

## 2023-04-04 RX ADMIN — ACETAMINOPHEN 650 MG: 325 TABLET ORAL at 08:47

## 2023-04-04 RX ADMIN — PHENYTOIN SODIUM 100 MG: 100 CAPSULE ORAL at 08:47

## 2023-04-04 RX ADMIN — OXYCODONE 10 MG: 5 TABLET ORAL at 10:36

## 2023-04-04 RX ADMIN — ASPIRIN 325 MG: 325 TABLET, COATED ORAL at 08:47

## 2023-04-04 RX ADMIN — POLYETHYLENE GLYCOL 3350 17 G: 17 POWDER, FOR SOLUTION ORAL at 08:49

## 2023-04-04 RX ADMIN — PHENYTOIN SODIUM 100 MG: 100 CAPSULE ORAL at 15:05

## 2023-04-04 RX ADMIN — WATER 2000 MG: 1 INJECTION INTRAMUSCULAR; INTRAVENOUS; SUBCUTANEOUS at 02:59

## 2023-04-04 RX ADMIN — OXYCODONE 10 MG: 5 TABLET ORAL at 02:57

## 2023-04-04 RX ADMIN — Medication 10 ML: at 08:45

## 2023-04-04 RX ADMIN — ACETAMINOPHEN 650 MG: 325 TABLET ORAL at 15:05

## 2023-04-04 RX ADMIN — ONDANSETRON 4 MG: 2 INJECTION INTRAMUSCULAR; INTRAVENOUS at 05:05

## 2023-04-04 RX ADMIN — PANTOPRAZOLE SODIUM 40 MG: 40 TABLET, DELAYED RELEASE ORAL at 05:05

## 2023-04-04 ASSESSMENT — PAIN SCALES - GENERAL
PAINLEVEL_OUTOF10: 9
PAINLEVEL_OUTOF10: 9
PAINLEVEL_OUTOF10: 8

## 2023-04-04 ASSESSMENT — PAIN DESCRIPTION - LOCATION
LOCATION: SHOULDER;NECK
LOCATION: SHOULDER;NECK

## 2023-04-04 ASSESSMENT — PAIN DESCRIPTION - DESCRIPTORS
DESCRIPTORS: ACHING;DISCOMFORT;SORE;SHARP
DESCRIPTORS: ACHING;DISCOMFORT;SORE

## 2023-04-04 ASSESSMENT — PAIN DESCRIPTION - ORIENTATION
ORIENTATION: RIGHT
ORIENTATION: RIGHT

## 2023-04-04 NOTE — PROGRESS NOTES
Department of Orthopedic Surgery  Shoulder Service  Attending Progress Note        Subjective:  No complaints. Patient resting comfortably in bed. No acute issues overnight. Notes full neurovascular control of the right upper extremity following preoperative nerve block    Vitals  VITALS:  /70   Pulse 86   Temp 97.8 °F (36.6 °C) (Oral)   Resp 16   SpO2 98%     PHYSICAL EXAM:    Orientation:  alert and oriented to person, place and time    Right Upper Extremity    Incision:  dressing in place, clean, dry, and intact    Upper Extremity Motor:  Deltoid:  5/5  Biceps:  5/5  Triceps:  5/5  Wrist Flexion:  5/5  Wrist Extension:  5/5  Finger Flexion:  5/5  Finger Extension:  5/5  Upper Extremity Sensory:  Axillary:  intact  Median:  intact  Radial:  intact  Ulnar:  intact  Vascular:  Radial:  2    Brace/Sling: Sling intact    HgB:    Lab Results   Component Value Date/Time    HGB 11.9 04/04/2023 05:19 AM     CBC:   Lab Results   Component Value Date/Time    WBC 9.5 04/04/2023 05:19 AM    RBC 3.63 04/04/2023 05:19 AM    RBC 4.84 12/16/2022 11:22 AM    HGB 11.9 04/04/2023 05:19 AM    HCT 35.0 04/04/2023 05:19 AM    MCV 96.4 04/04/2023 05:19 AM    MCH 32.8 04/04/2023 05:19 AM    MCHC 34.0 04/04/2023 05:19 AM    RDW 14.4 04/04/2023 05:19 AM     04/04/2023 05:19 AM    MPV 11.2 04/04/2023 05:19 AM       ASSESSMENT AND PLAN:    Post operative day 1 status post right reverse total shoulder arthoplasty    1:  Continue physical therapy and occupational therapy  2:  Discharge Plan: Previous residence, home health/therapy  3: Patient doing well. Reviewed postoperative plan with him at the bedside today. I explained and emphasized the need and importance for postoperative sling wear. [facility] will begin [facility] upon discharge. All questions answered. Discharge instructions left on chart. We will follow-up in my office on 4/18/2023 for standard postoperative follow-up.       Matt King DO  4/4/2023

## 2023-04-04 NOTE — PROGRESS NOTES
Was doing bedside report and patient was standing, using the phone, with his right arm hanging by his side and complaining of pain. Educated patient that he is to be wearing his sling. If he would wear his sling, his pain should not be that bad. He also stated he has a balance issue and his right arm is the one he uses to catch himself. He also told this nurse that he is left handed. I placed his sling back on and educated him that he would need to use his left arm to catch him when losing his balance.

## 2023-04-04 NOTE — PLAN OF CARE
Problem: Discharge Planning  Goal: Discharge to home or other facility with appropriate resources  4/4/2023 0309 by Nicole Fabian RN  Outcome: Progressing  Flowsheets (Taken 4/3/2023 2015)  Discharge to home or other facility with appropriate resources: Identify barriers to discharge with patient and caregiver  4/3/2023 1521 by Eil Eli RN  Outcome: Progressing  Flowsheets (Taken 4/3/2023 1350)  Discharge to home or other facility with appropriate resources: Identify barriers to discharge with patient and caregiver     Problem: Pain  Goal: Verbalizes/displays adequate comfort level or baseline comfort level  4/4/2023 0309 by Nicole Fabian RN  Outcome: Progressing  4/3/2023 1521 by Eli Eli RN  Outcome: Progressing     Problem: Safety - Adult  Goal: Free from fall injury  Outcome: Progressing

## 2023-04-04 NOTE — DISCHARGE SUMMARY
masses, no organomegaly   Genitalia:    Normal male without lesion, discharge or tenderness   Rectal:    Normal tone, normal prostate, no masses or tenderness;    guaiac negative stool   Extremities: Sling in place about right upper extremity. Strength and sensation appropriate. Postoperative dressing benign. Pulses:   2+ and symmetric all extremities   Skin:   Skin color, texture, turgor normal, no rashes or lesions   Lymph nodes:   Cervical, supraclavicular, and axillary nodes normal   Neurologic:   CNII-XII intact.  Normal strength, sensation and reflexes       throughout       Disposition: home    Patient Instructions:   Maintain sling  Okay to begin motion/therapy with [facility]  Activity: activity as tolerated  Diet: regular diet  Wound Care: Maintain Aquacel dressing    Follow-up with Dr. Kari Real in 2 weeks    Signed:  Raji Larsen DO  4/4/2023  4:09 PM

## 2023-04-04 NOTE — PLAN OF CARE
Problem: Discharge Planning  Goal: Discharge to home or other facility with appropriate resources  4/4/2023 1111 by Kodi Oshea RN  Outcome: Progressing  4/4/2023 0309 by Thom Fontanez RN  Outcome: Progressing  Flowsheets (Taken 4/3/2023 2015)  Discharge to home or other facility with appropriate resources: Identify barriers to discharge with patient and caregiver     Problem: Pain  Goal: Verbalizes/displays adequate comfort level or baseline comfort level  4/4/2023 1111 by Kodi Oshea RN  Outcome: Progressing  4/4/2023 0309 by Thom Fontanez RN  Outcome: Progressing     Problem: Safety - Adult  Goal: Free from fall injury  4/4/2023 1111 by Kodi Oshea RN  Outcome: Progressing  4/4/2023 0309 by Thom Fontanez RN  Outcome: Progressing

## 2023-04-04 NOTE — CARE COORDINATION
Updated plan of care. POD#1. Plan for discharge today, home with [facility]-orders completed and notified.  Will follow-mjo
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
## 2023-04-04 NOTE — PROGRESS NOTES
P Quality Flow/Interdisciplinary Rounds Progress Note        Quality Flow Rounds held on April 4, 2023    Disciplines Attending:  Bedside Nurse, , , and Nursing Unit Leadership    Mariel Ortega was admitted on 4/3/2023  7:11 AM    Anticipated Discharge Date:  Expected Discharge Date: 04/04/23    Disposition:    Marino Score:  Marino Scale Score: 21    Readmission Risk              Risk of Unplanned Readmission:  0           Discussed patient goal for the day, patient clinical progression, and barriers to discharge.   The following Goal(s) of the Day/Commitment(s) have been identified:   Discharge planning      Jean Claude Huston RN  April 4, 2023

## 2023-04-04 NOTE — PROGRESS NOTES
Nurse found patient using Right arm, patient arm is not in sling. Nurse re-applied sling, patient educated proper alignment and NWB status.

## 2023-07-13 ENCOUNTER — HOSPITAL ENCOUNTER (OUTPATIENT)
Age: 63
Discharge: HOME | End: 2023-07-13
Payer: MEDICARE

## 2023-07-13 DIAGNOSIS — R73.01 IFG (IMPAIRED FASTING GLUCOSE): ICD-10-CM

## 2023-07-13 DIAGNOSIS — E55.9 VITAMIN D DEFICIENCY: ICD-10-CM

## 2023-07-13 DIAGNOSIS — K21.9 GASTROESOPHAGEAL REFLUX DISEASE WITHOUT ESOPHAGITIS: ICD-10-CM

## 2023-07-13 DIAGNOSIS — E78.5 DYSLIPIDEMIA: ICD-10-CM

## 2023-07-13 DIAGNOSIS — Z12.5 PROSTATE CANCER SCREENING: ICD-10-CM

## 2023-07-13 DIAGNOSIS — G40.909 SEIZURE DISORDER (HCC): ICD-10-CM

## 2023-07-13 LAB
ALBUMIN SERPL-MCNC: 4.1 G/DL (ref 3.5–5.2)
ALP SERPL-CCNC: 183 U/L (ref 40–129)
ALT SERPL-CCNC: 9 U/L (ref 0–40)
ANION GAP SERPL CALCULATED.3IONS-SCNC: 9 MMOL/L (ref 7–16)
AST SERPL-CCNC: 14 U/L (ref 0–39)
BASOPHILS # BLD: 0.03 E9/L (ref 0–0.2)
BASOPHILS NFR BLD: 0.9 % (ref 0–2)
BILIRUB SERPL-MCNC: 0.2 MG/DL (ref 0–1.2)
BUN SERPL-MCNC: 13 MG/DL (ref 6–23)
CALCIUM SERPL-MCNC: 9.5 MG/DL (ref 8.6–10.2)
CHLORIDE SERPL-SCNC: 105 MMOL/L (ref 98–107)
CHOLESTEROL, TOTAL: 223 MG/DL (ref 0–199)
CO2 SERPL-SCNC: 28 MMOL/L (ref 22–29)
CREAT SERPL-MCNC: 0.8 MG/DL (ref 0.7–1.2)
EOSINOPHIL # BLD: 0.11 E9/L (ref 0.05–0.5)
EOSINOPHIL NFR BLD: 3.3 % (ref 0–6)
ERYTHROCYTE [DISTWIDTH] IN BLOOD BY AUTOMATED COUNT: 13.8 FL (ref 11.5–15)
GLUCOSE SERPL-MCNC: 99 MG/DL (ref 74–99)
HBA1C MFR BLD: 5.2 % (ref 4–5.6)
HCT VFR BLD AUTO: 44.2 % (ref 37–54)
HDLC SERPL-MCNC: 70 MG/DL
HGB BLD-MCNC: 15.1 G/DL (ref 12.5–16.5)
IMM GRANULOCYTES # BLD: 0 E9/L
IMM GRANULOCYTES NFR BLD: 0 % (ref 0–5)
LDLC SERPL CALC-MCNC: 135 MG/DL (ref 0–99)
LYMPHOCYTES # BLD: 1.37 E9/L (ref 1.5–4)
LYMPHOCYTES NFR BLD: 40.9 % (ref 20–42)
MCH RBC QN AUTO: 31.7 PG (ref 26–35)
MCHC RBC AUTO-ENTMCNC: 34.2 % (ref 32–34.5)
MCV RBC AUTO: 92.7 FL (ref 80–99.9)
MONOCYTES # BLD: 0.42 E9/L (ref 0.1–0.95)
MONOCYTES NFR BLD: 12.5 % (ref 2–12)
NEUTROPHILS # BLD: 1.42 E9/L (ref 1.8–7.3)
NEUTS SEG NFR BLD: 42.4 % (ref 43–80)
PLATELET # BLD AUTO: 290 E9/L (ref 130–450)
PMV BLD AUTO: 11.1 FL (ref 7–12)
POTASSIUM SERPL-SCNC: 4.5 MMOL/L (ref 3.5–5)
PROT SERPL-MCNC: 7.1 G/DL (ref 6.4–8.3)
RBC # BLD AUTO: 4.77 E12/L (ref 3.8–5.8)
SODIUM SERPL-SCNC: 142 MMOL/L (ref 132–146)
T4 FREE SERPL-MCNC: 1.13 NG/DL (ref 0.93–1.7)
TRIGL SERPL-MCNC: 88 MG/DL (ref 0–149)
TSH SERPL-MCNC: 0.99 UIU/ML (ref 0.27–4.2)
VITAMIN D 25-HYDROXY: 38 NG/ML (ref 30–100)
VLDLC SERPL CALC-MCNC: 18 MG/DL
WBC # BLD: 3.4 E9/L (ref 4.5–11.5)

## 2023-07-13 PROCEDURE — 80061 LIPID PANEL: CPT

## 2023-07-13 PROCEDURE — 82306 VITAMIN D 25 HYDROXY: CPT

## 2023-07-13 PROCEDURE — 83036 HEMOGLOBIN GLYCOSYLATED A1C: CPT

## 2023-07-13 PROCEDURE — 84443 ASSAY THYROID STIM HORMONE: CPT

## 2023-07-13 PROCEDURE — 84439 ASSAY OF FREE THYROXINE: CPT

## 2023-07-13 PROCEDURE — 36415 COLL VENOUS BLD VENIPUNCTURE: CPT

## 2023-07-13 PROCEDURE — 80053 COMPREHEN METABOLIC PANEL: CPT

## 2023-07-13 PROCEDURE — 85025 COMPLETE CBC W/AUTO DIFF WBC: CPT

## 2023-08-10 ENCOUNTER — HOSPITAL ENCOUNTER (OUTPATIENT)
Dept: ULTRASOUND IMAGING | Age: 63
Discharge: HOME | End: 2023-08-10
Payer: COMMERCIAL

## 2023-08-10 DIAGNOSIS — Z13.6 SCREENING FOR AAA (ABDOMINAL AORTIC ANEURYSM): ICD-10-CM

## 2023-08-10 PROCEDURE — 76706 US ABDL AORTA SCREEN AAA: CPT

## 2023-11-22 ENCOUNTER — OFFICE VISIT (OUTPATIENT)
Dept: FAMILY MEDICINE CLINIC | Age: 63
End: 2023-11-22
Payer: MEDICARE

## 2023-11-22 VITALS
OXYGEN SATURATION: 98 % | HEART RATE: 74 BPM | DIASTOLIC BLOOD PRESSURE: 70 MMHG | RESPIRATION RATE: 16 BRPM | TEMPERATURE: 97.1 F | WEIGHT: 185.6 LBS | SYSTOLIC BLOOD PRESSURE: 132 MMHG | BODY MASS INDEX: 25.98 KG/M2 | HEIGHT: 71 IN

## 2023-11-22 DIAGNOSIS — K21.9 GASTROESOPHAGEAL REFLUX DISEASE WITHOUT ESOPHAGITIS: Chronic | ICD-10-CM

## 2023-11-22 DIAGNOSIS — Z12.5 PROSTATE CANCER SCREENING: ICD-10-CM

## 2023-11-22 DIAGNOSIS — G40.909 SEIZURE DISORDER (HCC): Chronic | ICD-10-CM

## 2023-11-22 DIAGNOSIS — E78.5 DYSLIPIDEMIA: Primary | Chronic | ICD-10-CM

## 2023-11-22 DIAGNOSIS — E55.9 VITAMIN D DEFICIENCY: Chronic | ICD-10-CM

## 2023-11-22 PROBLEM — M79.601 PAIN, ARM, RIGHT: Status: RESOLVED | Noted: 2019-09-04 | Resolved: 2023-11-22

## 2023-11-22 PROBLEM — M54.2 NECK PAIN: Status: RESOLVED | Noted: 2022-12-23 | Resolved: 2023-11-22

## 2023-11-22 PROBLEM — S13.4XXS: Status: RESOLVED | Noted: 2022-10-13 | Resolved: 2023-11-22

## 2023-11-22 PROCEDURE — 1123F ACP DISCUSS/DSCN MKR DOCD: CPT | Performed by: FAMILY MEDICINE

## 2023-11-22 PROCEDURE — 99214 OFFICE O/P EST MOD 30 MIN: CPT | Performed by: FAMILY MEDICINE

## 2023-11-22 RX ORDER — PHENYTOIN SODIUM 100 MG/1
100 CAPSULE, EXTENDED RELEASE ORAL 3 TIMES DAILY
Qty: 270 CAPSULE | Refills: 1 | Status: SHIPPED | OUTPATIENT
Start: 2023-11-22

## 2023-11-22 ASSESSMENT — ENCOUNTER SYMPTOMS
BLOOD IN STOOL: 0
BACK PAIN: 1
CHEST TIGHTNESS: 0
SHORTNESS OF BREATH: 0

## 2023-11-22 NOTE — PROGRESS NOTES
[address]   Patient is a 70y.o. year old male who presents with:  Chief Complaint   Patient presents with    Health Maintenance     Blood work not done; Colonoscopy 1999 by Dr Eileen Darling    Gastroesophageal Reflux     No issues or concerns     Patient also follows with: Ortho, [facility]    HPI    GERD  Current treatment: omeprazole 20mg daily  Recent changes in medication: none. Seizure disorder  Current treatment phenytoin 100 mg TID  Recent change in medication: None  Patient reports last seizure occurred in 1996. Vitamin D deficiency  Current treatment: cholecalciferol 1000 units daily  Recent changes in medication: none  Lab Results   Component Value Date    VITD25 38 07/13/2023    VITD25 50 12/16/2022    VITD25 58 06/07/2022     Dyslipidemia  Current treatment: none  Recent changes in medications: none   Indications for statin therapy include: LDL > 160 or other evidence of genetic hyperlipidemia. Father with CAD diagnosed in his late 76s. Declined statin  Lab Results   Component Value Date    CHOL 223 (H) 07/13/2023    HDL 70 07/13/2023    LDLCALC 135 (H) 07/13/2023    TRIG 88 07/13/2023   The 10-year ASCVD risk score (Michael MEDINA, et al., 2019) is: 11.9%    Values used to calculate the score:      Age: 70 years      Sex: Male      Is Non- : No      Diabetic: No      Tobacco smoker: No      Systolic Blood Pressure: 805 mmHg      Is BP treated: No      HDL Cholesterol: 70 mg/dL      Total Cholesterol: 223 mg/dL     IFG  Current treatment: none  Patient denies history of diabetes or treatment with diabetic medications.    Lab Results   Component Value Date    LABA1C 5.2 07/13/2023    LABA1C 5.1 12/16/2022    LABA1C 4.9 06/07/2022    GLUCOSE 99 07/13/2023    GLUCOSE 92 03/29/2023    GLUCOSE 112 (H) 12/16/2022     Prostate Ca screening  PSA 2.58 12/2022  Lab Results   Component Value Date    PSA 2.33 11/30/2021    PSA 2.55 06/11/2020    PSA 2.19 02/20/2019       Review of Systems

## 2024-01-31 DIAGNOSIS — E55.9 VITAMIN D DEFICIENCY: Chronic | ICD-10-CM

## 2024-01-31 DIAGNOSIS — K21.9 GASTROESOPHAGEAL REFLUX DISEASE WITHOUT ESOPHAGITIS: Chronic | ICD-10-CM

## 2024-01-31 RX ORDER — OMEPRAZOLE 20 MG/1
20 CAPSULE, DELAYED RELEASE ORAL DAILY
Qty: 90 CAPSULE | Refills: 1 | Status: SHIPPED | OUTPATIENT
Start: 2024-01-31

## 2024-01-31 RX ORDER — MULTIVIT-MIN/IRON/FOLIC ACID/K 18-600-40
1 CAPSULE ORAL DAILY
Qty: 90 CAPSULE | Refills: 1 | Status: SHIPPED | OUTPATIENT
Start: 2024-01-31

## 2024-02-15 ENCOUNTER — HOSPITAL ENCOUNTER (OUTPATIENT)
Age: 64
Discharge: HOME | End: 2024-02-15
Payer: MEDICARE

## 2024-02-15 DIAGNOSIS — E78.5 DYSLIPIDEMIA: Chronic | ICD-10-CM

## 2024-02-15 DIAGNOSIS — K21.9 GASTROESOPHAGEAL REFLUX DISEASE WITHOUT ESOPHAGITIS: Chronic | ICD-10-CM

## 2024-02-15 DIAGNOSIS — G40.909 SEIZURE DISORDER (HCC): Chronic | ICD-10-CM

## 2024-02-15 DIAGNOSIS — Z12.5 PROSTATE CANCER SCREENING: ICD-10-CM

## 2024-02-15 DIAGNOSIS — E55.9 VITAMIN D DEFICIENCY: Chronic | ICD-10-CM

## 2024-02-15 LAB
25(OH)D3 SERPL-MCNC: 50.2 NG/ML (ref 30–100)
ALBUMIN SERPL-MCNC: 4.2 G/DL (ref 3.5–5.2)
ALP SERPL-CCNC: 144 U/L (ref 40–129)
ALT SERPL-CCNC: 13 U/L (ref 0–40)
ANION GAP SERPL CALCULATED.3IONS-SCNC: 11 MMOL/L (ref 7–16)
AST SERPL-CCNC: 16 U/L (ref 0–39)
BASOPHILS # BLD: 0.02 K/UL (ref 0–0.2)
BASOPHILS NFR BLD: 0 % (ref 0–2)
BILIRUB SERPL-MCNC: 0.3 MG/DL (ref 0–1.2)
BUN SERPL-MCNC: 19 MG/DL (ref 6–23)
CALCIUM SERPL-MCNC: 9 MG/DL (ref 8.6–10.2)
CHLORIDE SERPL-SCNC: 105 MMOL/L (ref 98–107)
CHOLEST SERPL-MCNC: 229 MG/DL
CO2 SERPL-SCNC: 26 MMOL/L (ref 22–29)
CREAT SERPL-MCNC: 0.8 MG/DL (ref 0.7–1.2)
EOSINOPHIL # BLD: 0.1 K/UL (ref 0.05–0.5)
EOSINOPHILS RELATIVE PERCENT: 2 % (ref 0–6)
ERYTHROCYTE [DISTWIDTH] IN BLOOD BY AUTOMATED COUNT: 14.2 % (ref 11.5–15)
FOLATE SERPL-MCNC: >20 NG/ML (ref 4.8–24.2)
GFR SERPL CREATININE-BSD FRML MDRD: >60 ML/MIN/1.73M2
GLUCOSE SERPL-MCNC: 93 MG/DL (ref 74–99)
HCT VFR BLD AUTO: 42.8 % (ref 37–54)
HDLC SERPL-MCNC: 80 MG/DL
HGB BLD-MCNC: 14.8 G/DL (ref 12.5–16.5)
IMM GRANULOCYTES # BLD AUTO: <0.03 K/UL (ref 0–0.58)
IMM GRANULOCYTES NFR BLD: 0 % (ref 0–5)
LDLC SERPL CALC-MCNC: 131 MG/DL
LYMPHOCYTES NFR BLD: 2.11 K/UL (ref 1.5–4)
LYMPHOCYTES RELATIVE PERCENT: 46 % (ref 20–42)
MCH RBC QN AUTO: 32.4 PG (ref 26–35)
MCHC RBC AUTO-ENTMCNC: 34.6 G/DL (ref 32–34.5)
MCV RBC AUTO: 93.7 FL (ref 80–99.9)
MONOCYTES NFR BLD: 0.49 K/UL (ref 0.1–0.95)
MONOCYTES NFR BLD: 11 % (ref 2–12)
NEUTROPHILS NFR BLD: 41 % (ref 43–80)
NEUTS SEG NFR BLD: 1.86 K/UL (ref 1.8–7.3)
PLATELET # BLD AUTO: 259 K/UL (ref 130–450)
PMV BLD AUTO: 11.8 FL (ref 7–12)
POTASSIUM SERPL-SCNC: 4.4 MMOL/L (ref 3.5–5)
PROT SERPL-MCNC: 7.2 G/DL (ref 6.4–8.3)
PSA SERPL-MCNC: 2.1 NG/ML (ref 0–4)
RBC # BLD AUTO: 4.57 M/UL (ref 3.8–5.8)
SODIUM SERPL-SCNC: 142 MMOL/L (ref 132–146)
T4 FREE SERPL-MCNC: 1.1 NG/DL (ref 0.9–1.7)
TRIGL SERPL-MCNC: 90 MG/DL
TSH SERPL DL<=0.05 MIU/L-ACNC: 1.21 UIU/ML (ref 0.27–4.2)
VIT B12 SERPL-MCNC: 377 PG/ML (ref 211–946)
VLDLC SERPL CALC-MCNC: 18 MG/DL
WBC OTHER # BLD: 4.6 K/UL (ref 4.5–11.5)

## 2024-02-15 PROCEDURE — 82306 VITAMIN D 25 HYDROXY: CPT

## 2024-02-15 PROCEDURE — 84439 ASSAY OF FREE THYROXINE: CPT

## 2024-02-15 PROCEDURE — 84443 ASSAY THYROID STIM HORMONE: CPT

## 2024-02-15 PROCEDURE — 80185 ASSAY OF PHENYTOIN TOTAL: CPT

## 2024-02-15 PROCEDURE — 80061 LIPID PANEL: CPT

## 2024-02-15 PROCEDURE — G0103 PSA SCREENING: HCPCS

## 2024-02-15 PROCEDURE — 80053 COMPREHEN METABOLIC PANEL: CPT

## 2024-02-15 PROCEDURE — 36415 COLL VENOUS BLD VENIPUNCTURE: CPT

## 2024-02-15 PROCEDURE — 82607 VITAMIN B-12: CPT

## 2024-02-15 PROCEDURE — 80186 ASSAY OF PHENYTOIN FREE: CPT

## 2024-02-15 PROCEDURE — 85025 COMPLETE CBC W/AUTO DIFF WBC: CPT

## 2024-02-15 PROCEDURE — 82746 ASSAY OF FOLIC ACID SERUM: CPT

## 2024-02-17 LAB
DATE LAST DOSE: NORMAL
PHENYTOIN DOSE: NORMAL MG
PHENYTOIN FREE SERPL-MCNC: 1.7 UG/ML (ref 1–2)
PHENYTOIN SERPL-MCNC: 17.9 UG/ML (ref 10–20)
TME LAST DOSE: NORMAL H

## 2024-02-22 ENCOUNTER — OFFICE VISIT (OUTPATIENT)
Dept: FAMILY MEDICINE CLINIC | Age: 64
End: 2024-02-22
Payer: MEDICARE

## 2024-02-22 VITALS
RESPIRATION RATE: 16 BRPM | OXYGEN SATURATION: 99 % | DIASTOLIC BLOOD PRESSURE: 82 MMHG | WEIGHT: 191 LBS | SYSTOLIC BLOOD PRESSURE: 132 MMHG | TEMPERATURE: 97.2 F | HEART RATE: 78 BPM | HEIGHT: 71 IN | BODY MASS INDEX: 26.74 KG/M2

## 2024-02-22 DIAGNOSIS — E55.9 VITAMIN D DEFICIENCY: ICD-10-CM

## 2024-02-22 DIAGNOSIS — M25.562 CHRONIC PAIN OF LEFT KNEE: ICD-10-CM

## 2024-02-22 DIAGNOSIS — G40.909 SEIZURE DISORDER (HCC): Chronic | ICD-10-CM

## 2024-02-22 DIAGNOSIS — E78.5 DYSLIPIDEMIA: Chronic | ICD-10-CM

## 2024-02-22 DIAGNOSIS — Z12.5 PROSTATE CANCER SCREENING: ICD-10-CM

## 2024-02-22 DIAGNOSIS — K21.9 GASTROESOPHAGEAL REFLUX DISEASE WITHOUT ESOPHAGITIS: Primary | Chronic | ICD-10-CM

## 2024-02-22 DIAGNOSIS — G89.29 CHRONIC PAIN OF LEFT KNEE: ICD-10-CM

## 2024-02-22 PROCEDURE — 1123F ACP DISCUSS/DSCN MKR DOCD: CPT | Performed by: FAMILY MEDICINE

## 2024-02-22 PROCEDURE — 99214 OFFICE O/P EST MOD 30 MIN: CPT | Performed by: FAMILY MEDICINE

## 2024-02-22 RX ORDER — ATORVASTATIN CALCIUM 10 MG/1
10 TABLET, FILM COATED ORAL NIGHTLY
Qty: 90 TABLET | Refills: 0 | Status: SHIPPED | OUTPATIENT
Start: 2024-02-22

## 2024-02-22 ASSESSMENT — PATIENT HEALTH QUESTIONNAIRE - PHQ9
SUM OF ALL RESPONSES TO PHQ9 QUESTIONS 1 & 2: 0
SUM OF ALL RESPONSES TO PHQ QUESTIONS 1-9: 0
2. FEELING DOWN, DEPRESSED OR HOPELESS: 0
1. LITTLE INTEREST OR PLEASURE IN DOING THINGS: 0
SUM OF ALL RESPONSES TO PHQ QUESTIONS 1-9: 0

## 2024-02-22 ASSESSMENT — ENCOUNTER SYMPTOMS
BLOOD IN STOOL: 0
CHEST TIGHTNESS: 0
BACK PAIN: 1
SHORTNESS OF BREATH: 0
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
## 2024-03-18 ENCOUNTER — TELEPHONE (OUTPATIENT)
Dept: FAMILY MEDICINE CLINIC | Age: 64
End: 2024-03-18

## 2024-03-18 NOTE — TELEPHONE ENCOUNTER
----- Message from Yuliya Bowles sent at 3/18/2024 12:53 PM EDT -----  Subject: Message to Provider    QUESTIONS  Information for Provider? Patient called and would like to get his xray   results left knee  ---------------------------------------------------------------------------  --------------  CALL BACK INFO  [phone]; OK to leave message on voicemail  ---------------------------------------------------------------------------  --------------  SCRIPT ANSWERS  Relationship to Patient? Self

## 2024-03-18 NOTE — TELEPHONE ENCOUNTER
Karine Sánchez MA P yx [facility]   Subject: Results Request    QUESTIONS  Results: xray left knee; Ordered by: John Munoz  Date Performed: 2024-02-22  ---------------------------------------------------------------------------  --------------  CALL BACK INFO    [phone]; OK to leave message on voicemail  -----------------------------------------------------------

## 2024-03-19 NOTE — TELEPHONE ENCOUNTER
Per Dr Munoz:   XR shows mild-moderate degen/arthritic changes.     Pt informed and verbalized understanding.

## 2024-04-22 ENCOUNTER — OFFICE VISIT (OUTPATIENT)
Dept: FAMILY MEDICINE CLINIC | Age: 64
End: 2024-04-22
Payer: MEDICARE

## 2024-04-22 VITALS
HEART RATE: 68 BPM | WEIGHT: 190 LBS | HEIGHT: 70 IN | BODY MASS INDEX: 27.2 KG/M2 | DIASTOLIC BLOOD PRESSURE: 72 MMHG | SYSTOLIC BLOOD PRESSURE: 126 MMHG | TEMPERATURE: 97.6 F | OXYGEN SATURATION: 98 % | RESPIRATION RATE: 16 BRPM

## 2024-04-22 DIAGNOSIS — M25.562 ACUTE PAIN OF LEFT KNEE: Primary | ICD-10-CM

## 2024-04-22 PROCEDURE — 99213 OFFICE O/P EST LOW 20 MIN: CPT

## 2024-04-22 PROCEDURE — 96372 THER/PROPH/DIAG INJ SC/IM: CPT

## 2024-04-22 PROCEDURE — 1123F ACP DISCUSS/DSCN MKR DOCD: CPT

## 2024-04-22 RX ORDER — DEXAMETHASONE SODIUM PHOSPHATE 10 MG/ML
10 INJECTION INTRAMUSCULAR; INTRAVENOUS ONCE
Status: COMPLETED | OUTPATIENT
Start: 2024-04-22 | End: 2024-04-22

## 2024-04-22 RX ADMIN — DEXAMETHASONE SODIUM PHOSPHATE 10 MG: 10 INJECTION INTRAMUSCULAR; INTRAVENOUS at 16:06

## 2024-04-22 SDOH — ECONOMIC STABILITY: INCOME INSECURITY: HOW HARD IS IT FOR YOU TO PAY FOR THE VERY BASICS LIKE FOOD, HOUSING, MEDICAL CARE, AND HEATING?: NOT HARD AT ALL

## 2024-04-22 SDOH — ECONOMIC STABILITY: FOOD INSECURITY: WITHIN THE PAST 12 MONTHS, THE FOOD YOU BOUGHT JUST DIDN'T LAST AND YOU DIDN'T HAVE MONEY TO GET MORE.: NEVER TRUE

## 2024-04-22 SDOH — ECONOMIC STABILITY: HOUSING INSECURITY
IN THE LAST 12 MONTHS, WAS THERE A TIME WHEN YOU DID NOT HAVE A STEADY PLACE TO SLEEP OR SLEPT IN A SHELTER (INCLUDING NOW)?: NO

## 2024-04-22 SDOH — ECONOMIC STABILITY: FOOD INSECURITY: WITHIN THE PAST 12 MONTHS, YOU WORRIED THAT YOUR FOOD WOULD RUN OUT BEFORE YOU GOT MONEY TO BUY MORE.: NEVER TRUE

## 2024-04-22 NOTE — PROGRESS NOTES
Chief Complaint       Knee Pain (Left knee pain, had x rays done in February and fell shortly after that.  Hurts really bad. 10 plus. Hard time standing and walking. No medications.)      History of Present Illness   Source of history provided by:  patient.      Jemima Shook is a 64 y.o. old male presenting to the walk in clinic for evaluation of left knee pain for the past 30 days. States the pain is located over the patella aspect and does not radiate. States the pain is progressive. Reports falling on left knee in february. Reports associated swelling and moderate pain with ROM. Pain is also exacerbated by ambulation. Denies any weakness, paresthesias, calf pain/edema, foot/ankle pain, hip pain, back pain, abrasions, fever, chills, rash, or any other symptoms. There has been a history or prior knee problems. Denies any history of previous knee surgery.  Has been taking Tylenol OTC without relief.    ROS    Unless otherwise stated in this report or unable to obtain because of the patient's clinical or mental status as evidenced by the medical record, this patients's positive and negative responses for Review of Systems, constitutional, psych, eyes, ENT, cardiovascular, respiratory, gastrointestinal, neurological, genitourinary, musculoskeletal, integument systems and systems related to the presenting problem are either stated in the preceding or were not pertinent or were negative for the symptoms and/or complaints related to the medical problem.[email or web address].    Physical Exam         VS:  /72 (Site: Left Upper Arm, Position: Sitting, Cuff Size: Medium Adult)   Pulse 68   Temp 97.6 °F (36.4 °C) (Infrared)   Resp 16   Ht 1.778 m (5' 10\")   Wt 86.2 kg (190 lb)   SpO2 98%   BMI 27.26 kg/m²    Oxygen Saturation Interpretation: Normal.    Constitutional:  Alert, development consistent with age.  Lungs: CTAB without wheezing, rales, or rhonchi.  CV: RRR without pathologic murmurs or gallops.  Knee:

## 2024-04-24 ENCOUNTER — HOSPITAL ENCOUNTER (OUTPATIENT)
Dept: GENERAL RADIOLOGY | Age: 64
Discharge: HOME | End: 2024-04-26
Payer: MEDICARE

## 2024-04-24 DIAGNOSIS — M25.562 ACUTE PAIN OF LEFT KNEE: ICD-10-CM

## 2024-04-24 PROCEDURE — 73562 X-RAY EXAM OF KNEE 3: CPT

## 2024-05-09 ENCOUNTER — OFFICE VISIT (OUTPATIENT)
Dept: ORTHOPEDIC SURGERY | Age: 64
End: 2024-05-09

## 2024-05-09 DIAGNOSIS — M17.12 PRIMARY OSTEOARTHRITIS OF LEFT KNEE: Primary | ICD-10-CM

## 2024-05-09 RX ORDER — IBUPROFEN 800 MG/1
800 TABLET ORAL EVERY 8 HOURS PRN
Qty: 40 TABLET | Refills: 2 | Status: SHIPPED | OUTPATIENT
Start: 2024-05-09

## 2024-05-09 NOTE — PROGRESS NOTES
discussed with patient shows mild tricompartmental joint space narrowing. No acute fractures or dislocations noted.       ASSESSMENT  Left knee osteoarthritis    PLAN  We discussed his left knee pain today. Images were discussed with patient showing osteoarthritis in the and patellofemoral compartments.  Recommend steroid injection to the left knee nonsteroid anti-inflammatory treatment with home exercise program. We discussed that he can receive these every 3 months and to allow a couple weeks for effect. Ibuprofen sent to pharmacy. We discussed the risks of NSAID use and how to take them appropriately. He is understanding of this. He is happy with this treatment plan. All questions and concerns were answered in detail. He can follow up as needed.     Left knee Steroid Injection    The left knee identified as the injection site. The risk and benefits of a cortisone injection were explained and the patient consented to the injection. Under sterile conditions,the left  knee was injected with a mixture of 80  mg of Kenalog and 3 mL of 0.25% Marcaine without complication. A sterile bandage was applied.      AMANDA Barkley - CNP  Orthopaedic Surgery   5/9/24   12:51 PM EDT     I have seen and evaluated the patient and agree with the above assessment on today's visit. I have performed the key components of the history and physical examination and concur completely with the findings and plans as documented.    Agree with ROS, examination, FMH, PMH, PSH, SocHx, and allergies as above.      -I explained to him his pathology in detail.  He certainly has osteoarthritis that is exacerbated by his AFO and abnormal gait pattern.  Recommend a steroid injection today.  She he can follow-up on an as-needed basis      Bonilla Chauhan DO   Orthopaedic Surgery   5/9/24  1:37 PM    Note: This report was completed using computerInteliVideo voiced recognition software.  Every effort has been made to ensure accuracy; however, inadvertent

## 2024-05-16 ENCOUNTER — HOSPITAL ENCOUNTER (OUTPATIENT)
Age: 64
Discharge: HOME | End: 2024-05-16
Payer: MEDICARE

## 2024-05-16 ENCOUNTER — HOSPITAL ENCOUNTER (OUTPATIENT)
Age: 64
Discharge: HOME | End: 2024-05-18
Payer: MEDICARE

## 2024-05-16 DIAGNOSIS — Z12.5 PROSTATE CANCER SCREENING: ICD-10-CM

## 2024-05-16 DIAGNOSIS — E55.9 VITAMIN D DEFICIENCY: ICD-10-CM

## 2024-05-16 DIAGNOSIS — E78.5 DYSLIPIDEMIA: Chronic | ICD-10-CM

## 2024-05-16 DIAGNOSIS — G40.909 SEIZURE DISORDER (HCC): Chronic | ICD-10-CM

## 2024-05-16 DIAGNOSIS — K21.9 GASTROESOPHAGEAL REFLUX DISEASE WITHOUT ESOPHAGITIS: Chronic | ICD-10-CM

## 2024-05-16 LAB
25(OH)D3 SERPL-MCNC: 51.7 NG/ML (ref 30–100)
ALBUMIN SERPL-MCNC: 4.3 G/DL (ref 3.5–5.2)
ALP SERPL-CCNC: 123 U/L (ref 40–129)
ALT SERPL-CCNC: 13 U/L (ref 0–40)
ANION GAP SERPL CALCULATED.3IONS-SCNC: 9 MMOL/L (ref 7–16)
AST SERPL-CCNC: 16 U/L (ref 0–39)
BASOPHILS # BLD: 0 K/UL (ref 0–0.2)
BASOPHILS NFR BLD: 0 % (ref 0–2)
BILIRUB SERPL-MCNC: 0.4 MG/DL (ref 0–1.2)
BUN SERPL-MCNC: 17 MG/DL (ref 6–23)
CALCIUM SERPL-MCNC: 9.1 MG/DL (ref 8.6–10.2)
CHLORIDE SERPL-SCNC: 103 MMOL/L (ref 98–107)
CHOLEST SERPL-MCNC: 194 MG/DL
CO2 SERPL-SCNC: 25 MMOL/L (ref 22–29)
CREAT SERPL-MCNC: 0.7 MG/DL (ref 0.7–1.2)
EOSINOPHIL # BLD: 0.09 K/UL (ref 0.05–0.5)
EOSINOPHILS RELATIVE PERCENT: 2 % (ref 0–6)
ERYTHROCYTE [DISTWIDTH] IN BLOOD BY AUTOMATED COUNT: 14.1 % (ref 11.5–15)
GFR, ESTIMATED: >90 ML/MIN/1.73M2
GLUCOSE SERPL-MCNC: 93 MG/DL (ref 74–99)
HCT VFR BLD AUTO: 43.4 % (ref 37–54)
HDLC SERPL-MCNC: 84 MG/DL
HGB BLD-MCNC: 15.2 G/DL (ref 12.5–16.5)
LDLC SERPL CALC-MCNC: 94 MG/DL
LYMPHOCYTES NFR BLD: 1.66 K/UL (ref 1.5–4)
LYMPHOCYTES RELATIVE PERCENT: 31 % (ref 20–42)
MCH RBC QN AUTO: 33.1 PG (ref 26–35)
MCHC RBC AUTO-ENTMCNC: 35 G/DL (ref 32–34.5)
MCV RBC AUTO: 94.6 FL (ref 80–99.9)
MONOCYTES NFR BLD: 0.74 K/UL (ref 0.1–0.95)
MONOCYTES NFR BLD: 14 % (ref 2–12)
NEUTROPHILS NFR BLD: 53 % (ref 43–80)
NEUTS SEG NFR BLD: 2.81 K/UL (ref 1.8–7.3)
PLATELET # BLD AUTO: 244 K/UL (ref 130–450)
PMV BLD AUTO: 10.8 FL (ref 7–12)
POTASSIUM SERPL-SCNC: 4.1 MMOL/L (ref 3.5–5)
PROT SERPL-MCNC: 7.1 G/DL (ref 6.4–8.3)
RBC # BLD AUTO: 4.59 M/UL (ref 3.8–5.8)
RBC # BLD: ABNORMAL 10*6/UL
RBC # BLD: ABNORMAL 10*6/UL
SODIUM SERPL-SCNC: 137 MMOL/L (ref 132–146)
TRIGL SERPL-MCNC: 81 MG/DL
VLDLC SERPL CALC-MCNC: 16 MG/DL
WBC OTHER # BLD: 5.3 K/UL (ref 4.5–11.5)

## 2024-05-16 PROCEDURE — 80186 ASSAY OF PHENYTOIN FREE: CPT

## 2024-05-16 PROCEDURE — 82306 VITAMIN D 25 HYDROXY: CPT

## 2024-05-16 PROCEDURE — 85025 COMPLETE CBC W/AUTO DIFF WBC: CPT

## 2024-05-16 PROCEDURE — 36415 COLL VENOUS BLD VENIPUNCTURE: CPT

## 2024-05-16 PROCEDURE — 80185 ASSAY OF PHENYTOIN TOTAL: CPT

## 2024-05-16 PROCEDURE — 80061 LIPID PANEL: CPT

## 2024-05-16 PROCEDURE — 80053 COMPREHEN METABOLIC PANEL: CPT

## 2024-05-18 LAB
DATE LAST DOSE: NORMAL
PHENYTOIN DOSE: NORMAL MG
PHENYTOIN FREE SERPL-MCNC: 1.7 UG/ML (ref 1–2)
PHENYTOIN SERPL-MCNC: 19.8 UG/ML (ref 10–20)
TME LAST DOSE: NORMAL H

## 2024-05-19 DIAGNOSIS — E78.5 DYSLIPIDEMIA: Chronic | ICD-10-CM

## 2024-05-20 RX ORDER — ATORVASTATIN CALCIUM 10 MG/1
10 TABLET, FILM COATED ORAL NIGHTLY
Qty: 90 TABLET | Refills: 0 | Status: SHIPPED | OUTPATIENT
Start: 2024-05-20

## 2024-05-23 ENCOUNTER — OFFICE VISIT (OUTPATIENT)
Dept: FAMILY MEDICINE CLINIC | Age: 64
End: 2024-05-23
Payer: MEDICARE

## 2024-05-23 VITALS
OXYGEN SATURATION: 97 % | HEIGHT: 71 IN | WEIGHT: 193 LBS | SYSTOLIC BLOOD PRESSURE: 114 MMHG | RESPIRATION RATE: 18 BRPM | DIASTOLIC BLOOD PRESSURE: 76 MMHG | TEMPERATURE: 97.1 F | HEART RATE: 79 BPM | BODY MASS INDEX: 27.02 KG/M2

## 2024-05-23 DIAGNOSIS — Z12.5 PROSTATE CANCER SCREENING: ICD-10-CM

## 2024-05-23 DIAGNOSIS — G89.29 CHRONIC PAIN OF LEFT KNEE: ICD-10-CM

## 2024-05-23 DIAGNOSIS — E55.9 VITAMIN D DEFICIENCY: ICD-10-CM

## 2024-05-23 DIAGNOSIS — M25.562 CHRONIC PAIN OF LEFT KNEE: ICD-10-CM

## 2024-05-23 DIAGNOSIS — G40.909 SEIZURE DISORDER (HCC): ICD-10-CM

## 2024-05-23 DIAGNOSIS — E78.5 DYSLIPIDEMIA: Primary | ICD-10-CM

## 2024-05-23 PROCEDURE — 1123F ACP DISCUSS/DSCN MKR DOCD: CPT | Performed by: FAMILY MEDICINE

## 2024-05-23 PROCEDURE — 99214 OFFICE O/P EST MOD 30 MIN: CPT | Performed by: FAMILY MEDICINE

## 2024-05-23 ASSESSMENT — ENCOUNTER SYMPTOMS
CHEST TIGHTNESS: 0
BACK PAIN: 1
SHORTNESS OF BREATH: 0
BLOOD IN STOOL: 0

## 2024-05-23 NOTE — PROGRESS NOTES
with Friends and Family: More than three times a week     Frequency of Social Gatherings with Friends and Family: More than three times a week     Attends Yazidi Services: More than 4 times per year     Active Member of Clubs or Organizations: No     Attends Club or Organization Meetings: Never     Marital Status:    Intimate Partner Violence: Not At Risk (8/6/2019)    Humiliation, Afraid, Rape, and Kick questionnaire     Fear of Current or Ex-Partner: No     Emotionally Abused: No     Physically Abused: No     Sexually Abused: No   Housing Stability: Unknown (4/22/2024)    Housing Stability Vital Sign     Unstable Housing in the Last Year: No       OBJECTIVE    /76 (Site: Right Upper Arm, Position: Sitting, Cuff Size: Medium Adult)   Pulse 79   Temp 97.1 °F (36.2 °C) (Temporal)   Resp 18   Ht 1.803 m (5' 11\")   Wt 87.5 kg (193 lb)   SpO2 97%   BMI 26.92 kg/m²     Wt Readings from Last 3 Encounters:   05/23/24 87.5 kg (193 lb)   04/22/24 86.2 kg (190 lb)   02/22/24 86.6 kg (191 lb)       Physical Exam  Constitutional:       Appearance: Normal appearance.   HENT:      Head: Normocephalic and atraumatic.   Eyes:      Conjunctiva/sclera: Conjunctivae normal.   Neck:      Thyroid: No thyroid mass or thyromegaly.      Vascular: No carotid bruit.   Cardiovascular:      Rate and Rhythm: Normal rate and regular rhythm.      Heart sounds: Normal heart sounds.   Pulmonary:      Effort: Pulmonary effort is normal.      Breath sounds: Normal breath sounds.   Abdominal:      General: There is no abdominal bruit.   Musculoskeletal:      Right lower leg: No edema.      Left lower leg: No edema.      Comments: AFO in place   Skin:     General: Skin is warm and dry.   Neurological:      General: No focal deficit present.      Mental Status: He is alert and oriented to person, place, and time. Mental status is at baseline.      Gait: Gait abnormal.   Psychiatric:         Mood and Affect: Mood normal.

## 2024-05-24 DIAGNOSIS — G40.909 SEIZURE DISORDER (HCC): Chronic | ICD-10-CM

## 2024-05-24 RX ORDER — PHENYTOIN SODIUM 100 MG/1
100 CAPSULE, EXTENDED RELEASE ORAL 3 TIMES DAILY
Qty: 270 CAPSULE | Refills: 1 | Status: SHIPPED | OUTPATIENT
Start: 2024-05-24

## 2024-06-12 ENCOUNTER — OFFICE VISIT (OUTPATIENT)
Dept: FAMILY MEDICINE CLINIC | Age: 64
End: 2024-06-12
Payer: MEDICARE

## 2024-06-12 VITALS
TEMPERATURE: 98.2 F | HEART RATE: 86 BPM | DIASTOLIC BLOOD PRESSURE: 84 MMHG | BODY MASS INDEX: 27.02 KG/M2 | RESPIRATION RATE: 16 BRPM | SYSTOLIC BLOOD PRESSURE: 138 MMHG | HEIGHT: 71 IN | OXYGEN SATURATION: 98 % | WEIGHT: 193 LBS

## 2024-06-12 DIAGNOSIS — M17.12 OSTEOARTHRITIS OF LEFT KNEE, UNSPECIFIED OSTEOARTHRITIS TYPE: Primary | ICD-10-CM

## 2024-06-12 PROCEDURE — 1123F ACP DISCUSS/DSCN MKR DOCD: CPT | Performed by: FAMILY MEDICINE

## 2024-06-12 PROCEDURE — 99213 OFFICE O/P EST LOW 20 MIN: CPT | Performed by: FAMILY MEDICINE

## 2024-06-12 ASSESSMENT — PATIENT HEALTH QUESTIONNAIRE - PHQ9
SUM OF ALL RESPONSES TO PHQ9 QUESTIONS 1 & 2: 0
SUM OF ALL RESPONSES TO PHQ QUESTIONS 1-9: 0
2. FEELING DOWN, DEPRESSED OR HOPELESS: NOT AT ALL
SUM OF ALL RESPONSES TO PHQ QUESTIONS 1-9: 0
1. LITTLE INTEREST OR PLEASURE IN DOING THINGS: NOT AT ALL
SUM OF ALL RESPONSES TO PHQ QUESTIONS 1-9: 0
SUM OF ALL RESPONSES TO PHQ QUESTIONS 1-9: 0

## 2024-06-12 ASSESSMENT — ENCOUNTER SYMPTOMS: SHORTNESS OF BREATH: 0
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
## 2024-06-16 ENCOUNTER — HOSPITAL ENCOUNTER (EMERGENCY)
Age: 64
Discharge: HOME | End: 2024-06-16
Attending: STUDENT IN AN ORGANIZED HEALTH CARE EDUCATION/TRAINING PROGRAM
Payer: MEDICARE

## 2024-06-16 ENCOUNTER — APPOINTMENT (OUTPATIENT)
Dept: CT IMAGING | Age: 64
End: 2024-06-16
Payer: MEDICARE

## 2024-06-16 VITALS
RESPIRATION RATE: 18 BRPM | HEIGHT: 71 IN | HEART RATE: 81 BPM | WEIGHT: 190 LBS | DIASTOLIC BLOOD PRESSURE: 71 MMHG | BODY MASS INDEX: 26.6 KG/M2 | TEMPERATURE: 97.3 F | OXYGEN SATURATION: 94 % | SYSTOLIC BLOOD PRESSURE: 127 MMHG
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
## 2024-06-16 DIAGNOSIS — M54.9 ACUTE BACK PAIN, UNSPECIFIED BACK LOCATION, UNSPECIFIED BACK PAIN LATERALITY: ICD-10-CM

## 2024-06-16 PROCEDURE — 6370000000 HC RX 637 (ALT 250 FOR IP): Performed by: STUDENT IN AN ORGANIZED HEALTH CARE EDUCATION/TRAINING PROGRAM

## 2024-06-16 PROCEDURE — 72128 CT CHEST SPINE W/O DYE: CPT

## 2024-06-16 PROCEDURE — 70450 CT HEAD/BRAIN W/O DYE: CPT

## 2024-06-16 PROCEDURE — 72125 CT NECK SPINE W/O DYE: CPT

## 2024-06-16 PROCEDURE — 99284 EMERGENCY DEPT VISIT MOD MDM: CPT

## 2024-06-16 PROCEDURE — 71250 CT THORAX DX C-: CPT

## 2024-06-16 RX ORDER — ACETAMINOPHEN 500 MG
1000 TABLET ORAL ONCE
Status: COMPLETED | OUTPATIENT
Start: 2024-06-16 | End: 2024-06-16

## 2024-06-16 RX ADMIN — ACETAMINOPHEN 1000 MG: 500 TABLET ORAL at 20:38

## 2024-06-16 ASSESSMENT — LIFESTYLE VARIABLES
HOW MANY STANDARD DRINKS CONTAINING ALCOHOL DO YOU HAVE ON A TYPICAL DAY: 1 OR 2
HOW OFTEN DO YOU HAVE A DRINK CONTAINING ALCOHOL: MONTHLY OR LESS

## 2024-06-16 ASSESSMENT — PAIN - FUNCTIONAL ASSESSMENT: PAIN_FUNCTIONAL_ASSESSMENT: NONE - DENIES PAIN

## 2024-06-16 ASSESSMENT — PAIN SCALES - GENERAL: PAINLEVEL_OUTOF10: 5

## 2024-06-17 NOTE — ED PROVIDER NOTES
------------------------- NURSING NOTES AND VITALS REVIEWED ---------------------------  Date / Time Roomed:  6/16/2024  8:16 PM  ED Bed Assignment:  18/18    The nursing notes within the ED encounter and vital signs as below have been reviewed.   /71   Pulse 81   Temp 97.3 °F (36.3 °C)   Resp 18   Ht 1.803 m (5' 11\")   Wt 86.2 kg (190 lb)   SpO2 94%   BMI 26.50 kg/m²   Oxygen Saturation Interpretation: Normal      --------------------------------- ADDITIONAL PROVIDER NOTES ---------------------------------  At this time the patient is without objective evidence of an acute process requiring hospitalization or inpatient management.  They have remained hemodynamically stable throughout their entire ED visit and are stable for discharge with outpatient follow-up.     The plan has been discussed in detail and they are aware of the specific conditions for emergent return, as well as the importance of follow-up.      Discharge Medication List as of 6/16/2024 11:36 PM          Diagnosis:  1. Fall, initial encounter    2. Acute back pain, unspecified back location, unspecified back pain laterality        Disposition:  Patient's disposition: Discharge to home  Patient's condition is stable.            Frank Bolton DO  06/17/24 0311

## 2024-06-18 ENCOUNTER — TELEPHONE (OUTPATIENT)
Dept: FAMILY MEDICINE CLINIC | Age: 64
End: 2024-06-18

## 2024-06-18 NOTE — TELEPHONE ENCOUNTER
Called pt and pt is asking for referral to Orthopedic doctor at [facility] for left knee pain.  Pt had previously seen Dr Chauhan for his knee.    ----- Message from Jr Palacio sent at 6/18/2024  2:18 PM EDT -----  Regarding: [facility] Referral Request  [facility] Referral Request    Reason for referral request: Specialty Provider    Specialist/Lab/Test patient is requesting (if known): [facility]   Address of the Clinic : [address] [place] [address]    Additional Information : Patient wants a referral because his left knee has a problem. He was seen with an orthopedic surgeon and give him a shot on the May 9,2024 and that's until August, but its not working. Patient wanted to got to the clinic mentioned above for second opinion. Patient knee is hurting and having a hard time walking.   --------------------------------------------------------------------------------------------------------------------------    Relationship to Patient: Self     Call Back Information: OK to leave message on voicemail  Preferred Call Back Number: Phone [phone]

## 2024-06-19 ENCOUNTER — TELEPHONE (OUTPATIENT)
Dept: FAMILY MEDICINE CLINIC | Age: 64
End: 2024-06-19

## 2024-06-19 DIAGNOSIS — G89.29 CHRONIC PAIN OF LEFT KNEE: Primary | ICD-10-CM

## 2024-06-19 DIAGNOSIS — M25.562 CHRONIC PAIN OF LEFT KNEE: Primary | ICD-10-CM

## 2024-06-19 NOTE — TELEPHONE ENCOUNTER
Pt called stating he sees the orthopedic surgeon at the [facility] on 7.1.2024 For osteoarthritis left knee. they are asking for order for MRI of his L Knee.  Order can be faxed to them.    Last seen 6/12/2024  Next appt 8/23/2024    Requested Prescriptions      No prescriptions requested or ordered in this encounter      [phone]

## 2024-07-22 ENCOUNTER — TELEPHONE (OUTPATIENT)
Dept: FAMILY MEDICINE CLINIC | Age: 64
End: 2024-07-22

## 2024-07-22 NOTE — TELEPHONE ENCOUNTER
Pt called he had MRI done on his left knee on 7/17/2024. Pt saw the results on my chart. He is not sure if he should do Physical Therapy that was set up prior to the MRI.  Pt asking for a return call     Last seen 6/12/2024  Next appt 8/23/2024    Requested Prescriptions      No prescriptions requested or ordered in this encounter

## 2024-07-24 ENCOUNTER — TELEPHONE (OUTPATIENT)
Dept: FAMILY MEDICINE CLINIC | Age: 64
End: 2024-07-24

## 2024-07-24 ENCOUNTER — OFFICE VISIT (OUTPATIENT)
Dept: FAMILY MEDICINE CLINIC | Age: 64
End: 2024-07-24
Payer: MEDICARE

## 2024-07-24 VITALS
RESPIRATION RATE: 16 BRPM | SYSTOLIC BLOOD PRESSURE: 118 MMHG | TEMPERATURE: 98.2 F | BODY MASS INDEX: 27.72 KG/M2 | HEART RATE: 78 BPM | HEIGHT: 71 IN | WEIGHT: 198 LBS | OXYGEN SATURATION: 98 % | DIASTOLIC BLOOD PRESSURE: 74 MMHG

## 2024-07-24 DIAGNOSIS — S83.272S COMPLEX TEAR OF LATERAL MENISCUS OF LEFT KNEE, UNSPECIFIED WHETHER OLD OR CURRENT TEAR, SEQUELA: Primary | ICD-10-CM

## 2024-07-24 DIAGNOSIS — G40.909 SEIZURE DISORDER (HCC): Chronic | ICD-10-CM

## 2024-07-24 DIAGNOSIS — K21.9 GASTROESOPHAGEAL REFLUX DISEASE WITHOUT ESOPHAGITIS: Chronic | ICD-10-CM

## 2024-07-24 PROCEDURE — 1123F ACP DISCUSS/DSCN MKR DOCD: CPT | Performed by: FAMILY MEDICINE

## 2024-07-24 PROCEDURE — 99213 OFFICE O/P EST LOW 20 MIN: CPT | Performed by: FAMILY MEDICINE

## 2024-07-24 RX ORDER — OMEPRAZOLE 20 MG/1
20 CAPSULE, DELAYED RELEASE ORAL DAILY
Qty: 90 CAPSULE | Refills: 1 | Status: SHIPPED | OUTPATIENT
Start: 2024-07-24

## 2024-07-24 RX ORDER — PHENYTOIN SODIUM 100 MG/1
100 CAPSULE, EXTENDED RELEASE ORAL 3 TIMES DAILY
Qty: 270 CAPSULE | Refills: 1 | Status: SHIPPED | OUTPATIENT
Start: 2024-07-24

## 2024-07-24 ASSESSMENT — ENCOUNTER SYMPTOMS: SHORTNESS OF BREATH: 0

## 2024-07-24 NOTE — TELEPHONE ENCOUNTER
Pt would like a referral to Dr. Fields. He stated he just met with PCP and was told to call the doctor in [place] to be seen about his knee. He called that office and was told they do not do surgery.     Last seen 7/24/2024  Next appt 8/23/2024    Requested Prescriptions      No prescriptions requested or ordered in this encounter

## 2024-07-24 NOTE — PROGRESS NOTES
(198 lb)   SpO2 98%   BMI 27.63 kg/m²     Wt Readings from Last 3 Encounters:   07/24/24 89.8 kg (198 lb)   07/17/24 86.2 kg (190 lb)   06/16/24 86.2 kg (190 lb)       Physical Exam  Constitutional:       Appearance: Normal appearance.      Comments: Sitting in wheelchair   HENT:      Head: Normocephalic and atraumatic.   Cardiovascular:      Rate and Rhythm: Normal rate and regular rhythm.   Pulmonary:      Effort: Pulmonary effort is normal.      Breath sounds: Normal breath sounds.   Musculoskeletal:      Left knee: Crepitus present. Tenderness present over the lateral joint line.      Right lower leg: No edema.      Left lower leg: No edema.   Skin:     General: Skin is warm and dry.   Neurological:      General: No focal deficit present.      Mental Status: He is alert and oriented to person, place, and time.   Psychiatric:         Mood and Affect: Mood normal.         Behavior: Behavior normal.       ASSESSMENT AND PLAN    1. Complex tear of lateral meniscus of left knee, unspecified whether old or current tear, sequela  Patient initially planned to schedule appointment with Ortho through [facility] where he was previously seen to review MRI results and recommendations.  Later called requesting referral to Dr. Fields-referral order placed.  - Mo Johnson DO, Orthopaedics and Sports Medicine, [place]    2. Seizure disorder (HCC)  - phenytoin (DILANTIN) 100 MG ER capsule; Take 1 capsule by mouth 3 times daily  Dispense: 270 capsule; Refill: 1    3. Gastroesophageal reflux disease without esophagitis  - omeprazole (PRILOSEC) 20 MG delayed release capsule; Take 1 capsule by mouth daily  Dispense: 90 capsule; Refill: 1    Return for routine f/u as previously scheduled, or sooner if needed.     John Munoz DO  07/24/24  6:10 PM      There are no Patient Instructions on file for this visit.

## 2024-07-28 DIAGNOSIS — E78.5 DYSLIPIDEMIA: Chronic | ICD-10-CM

## 2024-07-28 DIAGNOSIS — E55.9 VITAMIN D DEFICIENCY: Chronic | ICD-10-CM

## 2024-07-30 RX ORDER — ATORVASTATIN CALCIUM 10 MG/1
10 TABLET, FILM COATED ORAL NIGHTLY
Qty: 90 TABLET | Refills: 0 | Status: SHIPPED | OUTPATIENT
Start: 2024-07-30

## 2024-07-30 RX ORDER — ACETAMINOPHEN 160 MG
TABLET,DISINTEGRATING ORAL DAILY
Qty: 90 CAPSULE | Refills: 1 | Status: SHIPPED | OUTPATIENT
Start: 2024-07-30

## 2024-07-30 NOTE — TELEPHONE ENCOUNTER
Last seen 7/24/2024  Next appt 8/23/2024    Requested Prescriptions     Pending Prescriptions Disp Refills    Cholecalciferol (VITAMIN D3) 50 MCG (2000 UT) CAPS [Pharmacy Med Name: VITAMIN D3 2,000 UNIT SOFTGEL] 90 capsule 1     Sig: TAKE 1 CAPSULE BY MOUTH EVERY DAY    atorvastatin (LIPITOR) 10 MG tablet [Pharmacy Med Name: ATORVASTATIN 10 MG TABLET] 90 tablet 0     Sig: TAKE 1 TABLET BY MOUTH EVERY DAY AT NIGHT

## 2024-08-05 ENCOUNTER — HOSPITAL ENCOUNTER (EMERGENCY)
Age: 64
Discharge: HOME | End: 2024-08-05
Payer: MEDICARE

## 2024-08-05 ENCOUNTER — APPOINTMENT (OUTPATIENT)
Dept: GENERAL RADIOLOGY | Age: 64
End: 2024-08-05
Payer: MEDICARE

## 2024-08-05 VITALS
RESPIRATION RATE: 18 BRPM | DIASTOLIC BLOOD PRESSURE: 78 MMHG | WEIGHT: 190 LBS | BODY MASS INDEX: 26.51 KG/M2 | HEART RATE: 52 BPM | TEMPERATURE: 98.3 F | OXYGEN SATURATION: 97 % | SYSTOLIC BLOOD PRESSURE: 138 MMHG

## 2024-08-05 DIAGNOSIS — S40.021A CONTUSION OF MULTIPLE SITES OF RIGHT SHOULDER AND UPPER ARM, INITIAL ENCOUNTER: Primary | ICD-10-CM

## 2024-08-05 DIAGNOSIS — S40.011A CONTUSION OF MULTIPLE SITES OF RIGHT SHOULDER AND UPPER ARM, INITIAL ENCOUNTER: Primary | ICD-10-CM

## 2024-08-05 PROCEDURE — 99283 EMERGENCY DEPT VISIT LOW MDM: CPT

## 2024-08-05 PROCEDURE — 73060 X-RAY EXAM OF HUMERUS: CPT

## 2024-08-05 PROCEDURE — 73030 X-RAY EXAM OF SHOULDER: CPT

## 2024-08-05 RX ORDER — NAPROXEN 500 MG/1
500 TABLET ORAL 2 TIMES DAILY WITH MEALS
Qty: 20 TABLET | Refills: 0 | Status: SHIPPED | OUTPATIENT
Start: 2024-08-05

## 2024-08-05 ASSESSMENT — PAIN DESCRIPTION - ORIENTATION
ORIENTATION: RIGHT
ORIENTATION: RIGHT

## 2024-08-05 ASSESSMENT — PAIN SCALES - GENERAL
PAINLEVEL_OUTOF10: 10
PAINLEVEL_OUTOF10: 6

## 2024-08-05 ASSESSMENT — PAIN - FUNCTIONAL ASSESSMENT: PAIN_FUNCTIONAL_ASSESSMENT: 0-10

## 2024-08-05 ASSESSMENT — LIFESTYLE VARIABLES
HOW OFTEN DO YOU HAVE A DRINK CONTAINING ALCOHOL: NEVER
HOW MANY STANDARD DRINKS CONTAINING ALCOHOL DO YOU HAVE ON A TYPICAL DAY: PATIENT DOES NOT DRINK

## 2024-08-05 ASSESSMENT — PAIN DESCRIPTION - LOCATION
LOCATION: SHOULDER
LOCATION: ARM
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
## 2024-08-08 ENCOUNTER — OFFICE VISIT (OUTPATIENT)
Dept: FAMILY MEDICINE CLINIC | Age: 64
End: 2024-08-08
Payer: MEDICARE

## 2024-08-08 VITALS
HEART RATE: 83 BPM | TEMPERATURE: 97 F | WEIGHT: 198 LBS | HEIGHT: 71 IN | BODY MASS INDEX: 27.72 KG/M2 | DIASTOLIC BLOOD PRESSURE: 78 MMHG | RESPIRATION RATE: 18 BRPM | SYSTOLIC BLOOD PRESSURE: 130 MMHG | OXYGEN SATURATION: 99 %

## 2024-08-08 DIAGNOSIS — R29.6 RECURRENT FALLS WHILE WALKING: Primary | ICD-10-CM

## 2024-08-08 DIAGNOSIS — S83.272S COMPLEX TEAR OF LATERAL MENISCUS OF LEFT KNEE, UNSPECIFIED WHETHER OLD OR CURRENT TEAR, SEQUELA: ICD-10-CM

## 2024-08-08 DIAGNOSIS — M21.372 LEFT FOOT DROP: ICD-10-CM

## 2024-08-08 PROCEDURE — 1123F ACP DISCUSS/DSCN MKR DOCD: CPT | Performed by: FAMILY MEDICINE

## 2024-08-08 PROCEDURE — 99213 OFFICE O/P EST LOW 20 MIN: CPT | Performed by: FAMILY MEDICINE

## 2024-08-08 ASSESSMENT — ENCOUNTER SYMPTOMS: SHORTNESS OF BREATH: 0
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
## 2024-08-15 ENCOUNTER — HOSPITAL ENCOUNTER (OUTPATIENT)
Age: 64
Discharge: HOME | End: 2024-08-15
Payer: MEDICARE

## 2024-08-15 DIAGNOSIS — G40.909 SEIZURE DISORDER (HCC): Chronic | ICD-10-CM

## 2024-08-15 DIAGNOSIS — E78.5 DYSLIPIDEMIA: Chronic | ICD-10-CM

## 2024-08-15 DIAGNOSIS — K21.9 GASTROESOPHAGEAL REFLUX DISEASE WITHOUT ESOPHAGITIS: Chronic | ICD-10-CM

## 2024-08-15 DIAGNOSIS — E55.9 VITAMIN D DEFICIENCY: Chronic | ICD-10-CM

## 2024-08-15 DIAGNOSIS — Z12.5 PROSTATE CANCER SCREENING: ICD-10-CM

## 2024-08-15 LAB
25(OH)D3 SERPL-MCNC: 43.4 NG/ML (ref 30–100)
ALBUMIN SERPL-MCNC: 3.8 G/DL (ref 3.5–5.2)
ALP SERPL-CCNC: 131 U/L (ref 40–129)
ALT SERPL-CCNC: 14 U/L (ref 0–40)
ANION GAP SERPL CALCULATED.3IONS-SCNC: 9 MMOL/L (ref 7–16)
AST SERPL-CCNC: 18 U/L (ref 0–39)
BASOPHILS # BLD: 0.01 K/UL (ref 0–0.2)
BASOPHILS NFR BLD: 0 % (ref 0–2)
BILIRUB SERPL-MCNC: 0.3 MG/DL (ref 0–1.2)
BUN SERPL-MCNC: 17 MG/DL (ref 6–23)
CALCIUM SERPL-MCNC: 9 MG/DL (ref 8.6–10.2)
CHLORIDE SERPL-SCNC: 103 MMOL/L (ref 98–107)
CHOLEST SERPL-MCNC: 180 MG/DL
CO2 SERPL-SCNC: 27 MMOL/L (ref 22–29)
CREAT SERPL-MCNC: 0.7 MG/DL (ref 0.7–1.2)
DATE LAST DOSE: NORMAL
EOSINOPHIL # BLD: 0.1 K/UL (ref 0.05–0.5)
EOSINOPHILS RELATIVE PERCENT: 2 % (ref 0–6)
ERYTHROCYTE [DISTWIDTH] IN BLOOD BY AUTOMATED COUNT: 13.8 % (ref 11.5–15)
GFR, ESTIMATED: >90 ML/MIN/1.73M2
GLUCOSE SERPL-MCNC: 90 MG/DL (ref 74–99)
HCT VFR BLD AUTO: 41.8 % (ref 37–54)
HDLC SERPL-MCNC: 80 MG/DL
HGB BLD-MCNC: 14.6 G/DL (ref 12.5–16.5)
IMM GRANULOCYTES # BLD AUTO: <0.03 K/UL (ref 0–0.58)
IMM GRANULOCYTES NFR BLD: 0 % (ref 0–5)
LDLC SERPL CALC-MCNC: 83 MG/DL
LYMPHOCYTES NFR BLD: 1.45 K/UL (ref 1.5–4)
LYMPHOCYTES RELATIVE PERCENT: 34 % (ref 20–42)
MCH RBC QN AUTO: 33.5 PG (ref 26–35)
MCHC RBC AUTO-ENTMCNC: 34.9 G/DL (ref 32–34.5)
MCV RBC AUTO: 95.9 FL (ref 80–99.9)
MONOCYTES NFR BLD: 0.48 K/UL (ref 0.1–0.95)
MONOCYTES NFR BLD: 11 % (ref 2–12)
NEUTROPHILS NFR BLD: 53 % (ref 43–80)
NEUTS SEG NFR BLD: 2.28 K/UL (ref 1.8–7.3)
PHENYTOIN DOSE: NORMAL MG
PHENYTOIN SERPL-MCNC: 18.6 UG/ML (ref 10–20)
PLATELET # BLD AUTO: 210 K/UL (ref 130–450)
PMV BLD AUTO: 11.2 FL (ref 7–12)
POTASSIUM SERPL-SCNC: 3.8 MMOL/L (ref 3.5–5)
PROT SERPL-MCNC: 6.6 G/DL (ref 6.4–8.3)
RBC # BLD AUTO: 4.36 M/UL (ref 3.8–5.8)
SODIUM SERPL-SCNC: 139 MMOL/L (ref 132–146)
TME LAST DOSE: NORMAL H
TRIGL SERPL-MCNC: 85 MG/DL
VLDLC SERPL CALC-MCNC: 17 MG/DL
WBC OTHER # BLD: 4.3 K/UL (ref 4.5–11.5)

## 2024-08-15 PROCEDURE — 80061 LIPID PANEL: CPT

## 2024-08-15 PROCEDURE — 82306 VITAMIN D 25 HYDROXY: CPT

## 2024-08-15 PROCEDURE — 36415 COLL VENOUS BLD VENIPUNCTURE: CPT

## 2024-08-15 PROCEDURE — 80185 ASSAY OF PHENYTOIN TOTAL: CPT

## 2024-08-15 PROCEDURE — 80053 COMPREHEN METABOLIC PANEL: CPT

## 2024-08-15 PROCEDURE — 85025 COMPLETE CBC W/AUTO DIFF WBC: CPT

## 2024-08-22 ENCOUNTER — TELEPHONE (OUTPATIENT)
Dept: FAMILY MEDICINE CLINIC | Age: 64
End: 2024-08-22
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
## 2024-08-26 ENCOUNTER — OFFICE VISIT (OUTPATIENT)
Dept: ORTHOPEDIC SURGERY | Age: 64
End: 2024-08-26
Payer: MEDICARE

## 2024-08-26 VITALS — BODY MASS INDEX: 27.72 KG/M2 | WEIGHT: 198 LBS | HEIGHT: 71 IN

## 2024-08-26 DIAGNOSIS — M17.12 PRIMARY OSTEOARTHRITIS OF LEFT KNEE: Primary | ICD-10-CM

## 2024-08-26 PROCEDURE — 1123F ACP DISCUSS/DSCN MKR DOCD: CPT | Performed by: ORTHOPAEDIC SURGERY

## 2024-08-26 PROCEDURE — 99203 OFFICE O/P NEW LOW 30 MIN: CPT | Performed by: ORTHOPAEDIC SURGERY

## 2024-09-18 ENCOUNTER — NURSE ONLY (OUTPATIENT)
Dept: ORTHOPEDIC SURGERY | Age: 64
End: 2024-09-18
Payer: MEDICARE

## 2024-09-18 DIAGNOSIS — M17.12 PRIMARY OSTEOARTHRITIS OF LEFT KNEE: Primary | ICD-10-CM

## 2024-09-18 PROCEDURE — 20610 DRAIN/INJ JOINT/BURSA W/O US: CPT

## 2024-09-25 ENCOUNTER — TELEPHONE (OUTPATIENT)
Dept: FAMILY MEDICINE CLINIC | Age: 64
End: 2024-09-25

## 2024-09-25 DIAGNOSIS — R29.6 RECURRENT FALLS WHILE WALKING: Primary | ICD-10-CM

## 2024-09-25 DIAGNOSIS — M25.562 CHRONIC PAIN OF LEFT KNEE: ICD-10-CM

## 2024-09-25 DIAGNOSIS — S83.272S COMPLEX TEAR OF LATERAL MENISCUS OF LEFT KNEE, UNSPECIFIED WHETHER OLD OR CURRENT TEAR, SEQUELA: ICD-10-CM

## 2024-09-25 DIAGNOSIS — M17.12 OSTEOARTHRITIS OF LEFT KNEE, UNSPECIFIED OSTEOARTHRITIS TYPE: ICD-10-CM

## 2024-09-25 DIAGNOSIS — M25.562 ACUTE PAIN OF LEFT KNEE: ICD-10-CM

## 2024-09-25 DIAGNOSIS — G40.909 SEIZURE DISORDER (HCC): ICD-10-CM

## 2024-09-25 DIAGNOSIS — G89.29 CHRONIC PAIN OF LEFT KNEE: ICD-10-CM

## 2024-09-25 DIAGNOSIS — M21.372 LEFT FOOT DROP: ICD-10-CM

## 2024-10-03 ENCOUNTER — OFFICE VISIT (OUTPATIENT)
Dept: ORTHOPEDIC SURGERY | Age: 64
End: 2024-10-03
Payer: MEDICARE

## 2024-10-03 VITALS — TEMPERATURE: 98 F | WEIGHT: 198 LBS | HEIGHT: 71 IN | BODY MASS INDEX: 27.72 KG/M2

## 2024-10-03 DIAGNOSIS — S83.242A ACUTE MEDIAL MENISCUS TEAR, LEFT, INITIAL ENCOUNTER: Primary | ICD-10-CM

## 2024-10-03 DIAGNOSIS — M17.12 PRIMARY OSTEOARTHRITIS OF LEFT KNEE: ICD-10-CM

## 2024-10-03 PROCEDURE — 1123F ACP DISCUSS/DSCN MKR DOCD: CPT | Performed by: ORTHOPAEDIC SURGERY

## 2024-10-03 PROCEDURE — 99214 OFFICE O/P EST MOD 30 MIN: CPT | Performed by: ORTHOPAEDIC SURGERY

## 2024-10-03 NOTE — PROGRESS NOTES
further operative intervention, blood loss, PE/DVT, MI and death.  The risks are understood by the patient and they wish to proceed with a Left knee arthroscopy, medial menisectomy and debridement 11/15/24.       At least 30 minutes was spent discussing the diagnosis and treatment options with the patient with at least 50% of the time was spent with decision making and counseling the patient.

## 2024-10-13 NOTE — ACP (ADVANCE CARE PLANNING)
Advance Care Planning   Healthcare Decision Maker:    Primary Decision Maker: Mati Zelaya - Brother/Sister - [phone]    Secondary Decision Maker: Joce Rose - Brother/Sister - [phone]    Click here to complete Healthcare Decision Makers including selection of the Healthcare Decision Maker Relationship (ie \"Primary\"). regular

## 2024-10-14 ENCOUNTER — APPOINTMENT (OUTPATIENT)
Dept: CT IMAGING | Age: 64
End: 2024-10-14
Payer: MEDICARE

## 2024-10-14 ENCOUNTER — HOSPITAL ENCOUNTER (EMERGENCY)
Age: 64
Discharge: HOME | End: 2024-10-14
Attending: EMERGENCY MEDICINE
Payer: MEDICARE

## 2024-10-14 ENCOUNTER — APPOINTMENT (OUTPATIENT)
Dept: GENERAL RADIOLOGY | Age: 64
End: 2024-10-14
Payer: MEDICARE

## 2024-10-14 VITALS
DIASTOLIC BLOOD PRESSURE: 75 MMHG | TEMPERATURE: 97.6 F | RESPIRATION RATE: 18 BRPM | WEIGHT: 185 LBS | SYSTOLIC BLOOD PRESSURE: 149 MMHG | HEIGHT: 71 IN | BODY MASS INDEX: 25.9 KG/M2 | OXYGEN SATURATION: 100 % | HEART RATE: 80 BPM

## 2024-10-14 DIAGNOSIS — S20.219A CONTUSION OF CHEST WALL, UNSPECIFIED LATERALITY, INITIAL ENCOUNTER: ICD-10-CM
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
## 2024-10-14 DIAGNOSIS — S09.90XA CLOSED HEAD INJURY, INITIAL ENCOUNTER: ICD-10-CM

## 2024-10-14 PROCEDURE — 71046 X-RAY EXAM CHEST 2 VIEWS: CPT

## 2024-10-14 PROCEDURE — 72125 CT NECK SPINE W/O DYE: CPT

## 2024-10-14 PROCEDURE — 70450 CT HEAD/BRAIN W/O DYE: CPT

## 2024-10-14 PROCEDURE — 99284 EMERGENCY DEPT VISIT MOD MDM: CPT

## 2024-10-14 RX ORDER — LIDOCAINE 50 MG/G
1 PATCH TOPICAL DAILY
Qty: 10 PATCH | Refills: 0 | Status: SHIPPED | OUTPATIENT
Start: 2024-10-14 | End: 2024-10-24

## 2024-10-15 NOTE — DISCHARGE INSTRUCTIONS
Return to the ER if you have worsening pain, difficulty walking, talking, or seeing, numbness or weakness in your extremities, difficulty controlling your bowels or bladder, chest pain, difficulty breathing, abdominal pain, or any other new or concerning symptoms.    Follow-up with your primary care physician at the next available appointment.

## 2024-10-20 ENCOUNTER — PREP FOR PROCEDURE (OUTPATIENT)
Dept: ORTHOPEDIC SURGERY | Age: 64
End: 2024-10-20

## 2024-10-20 ENCOUNTER — TELEPHONE (OUTPATIENT)
Dept: ORTHOPEDIC SURGERY | Age: 64
End: 2024-10-20

## 2024-10-20 DIAGNOSIS — S83.242A ACUTE MEDIAL MENISCAL TEAR, LEFT, INITIAL ENCOUNTER: ICD-10-CM

## 2024-10-22 ENCOUNTER — OFFICE VISIT (OUTPATIENT)
Dept: FAMILY MEDICINE CLINIC | Age: 64
End: 2024-10-22
Payer: MEDICARE

## 2024-10-22 VITALS
SYSTOLIC BLOOD PRESSURE: 129 MMHG | DIASTOLIC BLOOD PRESSURE: 69 MMHG | HEART RATE: 73 BPM | WEIGHT: 185 LBS | OXYGEN SATURATION: 98 % | TEMPERATURE: 97.4 F | HEIGHT: 71 IN | RESPIRATION RATE: 19 BRPM | BODY MASS INDEX: 25.9 KG/M2

## 2024-10-22 DIAGNOSIS — A60.00 GENITAL HERPES SIMPLEX, UNSPECIFIED SITE: ICD-10-CM

## 2024-10-22 DIAGNOSIS — A60.00 GENITAL HERPES SIMPLEX, UNSPECIFIED SITE: Primary | ICD-10-CM

## 2024-10-22 PROCEDURE — 99213 OFFICE O/P EST LOW 20 MIN: CPT

## 2024-10-22 PROCEDURE — 1123F ACP DISCUSS/DSCN MKR DOCD: CPT

## 2024-10-22 RX ORDER — VALACYCLOVIR HYDROCHLORIDE 1 G/1
2000 TABLET, FILM COATED ORAL 2 TIMES DAILY
Qty: 4 TABLET | Refills: 0 | Status: SHIPPED | OUTPATIENT
Start: 2024-10-22 | End: 2024-10-23

## 2024-10-22 RX ORDER — DOXYCYCLINE 100 MG/1
100 CAPSULE ORAL 2 TIMES DAILY
Qty: 20 CAPSULE | Refills: 0 | Status: SHIPPED
Start: 2024-10-22 | End: 2024-10-22 | Stop reason: CLARIF

## 2024-10-22 NOTE — PROGRESS NOTES
Start date:      Quit date:      Years since quittin.8    Smokeless tobacco: Former     Quit date:    Vaping Use    Vaping status: Never Used   Substance Use Topics    Alcohol use: Not Currently    Drug use: No       Physical Exam:     Vitals:    10/22/24 1453   BP: 129/69   Pulse: 73   Resp: 19   Temp: 97.4 °F (36.3 °C)   TempSrc: Temporal   SpO2: 98%   Weight: 83.9 kg (185 lb)   Height: 1.803 m (5' 10.98\")       Physical Exam (PE)   Constitutional: Alert, development consistent with age.  HENT:      Head: Normocephalic.      Right Ear: External ear normal.      Left Ear: External ear normal.      Nose: Normal.      Mouth/Throat:     Mouth: Mucous membranes are moist.      Pharynx: Oropharynx is clear.  Eyes: Pupils: Pupils are equal, round, and reactive to light.   Neck: Normal ROM. Supple.  Cardiovascular: Heart RRR without pathologic murmurs or gallops.   Pulmonary: Respiratory effort normal.  Normal breath sounds.  Abdomen: Soft, nontender, normal bowel sounds.  Skin:  Normal turgor and appropriately dry to touch. Dry, erythematous, macular papular rash noted over the right genital area. Signs of excoriation. No TTP, vesicles, pustules, induration, or fluctuance. No bleeding or discharge noted. No lymphangitic streaking.  Musculoskeletal: General: Normal strength / ROM.  Neurological:  Orientation age-appropriate unless noted elsewhere.  Motor functions intact.  Psychiatric: Mood and Affect: Mood normal. Behavior: Behavior normal    Testing:   (All laboratory and radiology results have been personally reviewed by myself)  Labs:  No results found for this visit on 10/22/24.    Imaging:  All Radiology results interpreted by Radiologist unless otherwise noted.  No orders to display       Assessment / Plan:   The patient's vitals, allergies, medications, and past medical history have been reviewed.  Jemima was seen today for rash.    Diagnoses and all orders for this visit:    Genital herpes

## 2024-10-25 LAB
HSV 1 GLYCOPROTEIN G AB IGG: 23.8 IV
HSV 2 GLYCOPROTEIN G AB IGG: 11.1 IV

## 2024-10-26 ENCOUNTER — HOSPITAL ENCOUNTER (EMERGENCY)
Age: 64
Discharge: HOME | End: 2024-10-26
Attending: EMERGENCY MEDICINE
Payer: MEDICARE

## 2024-10-26 ENCOUNTER — APPOINTMENT (OUTPATIENT)
Dept: CT IMAGING | Age: 64
End: 2024-10-26
Payer: MEDICARE

## 2024-10-26 VITALS
TEMPERATURE: 98 F | RESPIRATION RATE: 16 BRPM | OXYGEN SATURATION: 98 % | DIASTOLIC BLOOD PRESSURE: 75 MMHG | HEART RATE: 77 BPM | SYSTOLIC BLOOD PRESSURE: 145 MMHG

## 2024-10-26 DIAGNOSIS — S22.22XA CLOSED FRACTURE OF BODY OF STERNUM, INITIAL ENCOUNTER: Primary | ICD-10-CM

## 2024-10-26 PROCEDURE — 6370000000 HC RX 637 (ALT 250 FOR IP): Performed by: EMERGENCY MEDICINE

## 2024-10-26 PROCEDURE — 71250 CT THORAX DX C-: CPT

## 2024-10-26 PROCEDURE — 99284 EMERGENCY DEPT VISIT MOD MDM: CPT

## 2024-10-26 RX ORDER — OXYCODONE AND ACETAMINOPHEN 7.5; 325 MG/1; MG/1
1 TABLET ORAL EVERY 8 HOURS PRN
Qty: 8 TABLET | Refills: 0 | Status: SHIPPED | OUTPATIENT
Start: 2024-10-26 | End: 2024-10-30

## 2024-10-26 RX ORDER — HYDROCODONE BITARTRATE AND ACETAMINOPHEN 5; 325 MG/1; MG/1
1 TABLET ORAL ONCE
Status: COMPLETED | OUTPATIENT
Start: 2024-10-26 | End: 2024-10-26

## 2024-10-26 RX ADMIN — HYDROCODONE BITARTRATE AND ACETAMINOPHEN 1 TABLET: 5; 325 TABLET ORAL at 17:21

## 2024-10-26 ASSESSMENT — ENCOUNTER SYMPTOMS
VOMITING: 0
ABDOMINAL PAIN: 0
COUGH: 0
WHEEZING: 0
BACK PAIN: 0
SHORTNESS OF BREATH: 0
NAUSEA: 0
CHEST TIGHTNESS: 0
SORE THROAT: 0
DIARRHEA: 0

## 2024-10-26 ASSESSMENT — LIFESTYLE VARIABLES: HOW OFTEN DO YOU HAVE A DRINK CONTAINING ALCOHOL: NEVER

## 2024-10-26 NOTE — ED NOTES
Department of Emergency Medicine  FIRST PROVIDER TRIAGE NOTE             Independent MLP           10/26/24  3:41 PM EDT    Date of Encounter: 10/26/24   MRN: [id number]      HPI: Jemima Shook is a 64 y.o. male who presents to the ED for Chest Injury (Pt c/o chest pain since fall on 10/14, painful inspiration and worse with movement, painful, was seen at that time, no cough, )       ROS: Negative for sob or fever.    PE: Gen Appearance/Constitutional: alert  HEENT: NC/NT. PERRLA,  Airway patent.  Neck: supple  CV: regular rate  Pulm: CTA bilat  GI: soft and NT  Musculoskeletal: moves all extremities x 4  Lymphatics: no edema     Initial Plan of Care: All treatment areas with department are currently occupied. Proceed toTreatment Area When Bed Available for ED Attending/MLP to Continue Care    Electronically signed by AMANDA Funk CNP   DD: 10/26/24

## 2024-10-26 NOTE — ED PROVIDER NOTES
Chief complaint:  Chest injury      HPI history provided by the patient  Patient presents here complaining of chest wall injury, he states he fell on October 14 and landed on his chest wall and has pain since that time to the chest wall diffusely.  No actual shortness of breath.  No palpitations.  No lightheadedness or syncope.  No headache although he hit his head then he was seen and evaluated states there is no headache now and no neck or back pain and no arm or leg pain or injuries no extremity weakness or numbness or paresthesia.  No abdominal pain.  States the whole chest wall hurts.    Review of Systems   Constitutional:  Negative for chills, diaphoresis, fatigue and fever.   HENT:  Negative for congestion and sore throat.    Respiratory:  Negative for cough, chest tightness, shortness of breath and wheezing.    Cardiovascular:  Positive for chest pain. Negative for palpitations and leg swelling.   Gastrointestinal:  Negative for abdominal pain, diarrhea, nausea and vomiting.   Genitourinary:  Negative for dysuria, flank pain, frequency and urgency.   Musculoskeletal:  Negative for arthralgias, back pain, gait problem, joint swelling, myalgias, neck pain and neck stiffness.   Skin:  Negative for rash and wound.   Neurological:  Negative for dizziness, seizures, syncope, weakness, light-headedness, numbness and headaches.   All other systems reviewed and are negative.       Physical Exam  Vitals and nursing note reviewed.   Constitutional:       General: He is not in acute distress.     Appearance: He is well-developed. He is not ill-appearing, toxic-appearing or diaphoretic.   HENT:      Head: Normocephalic and atraumatic.      Comments: No sign of acute head or face injuries  Eyes:      General: No scleral icterus.     Pupils: Pupils are equal, round, and reactive to light.      Comments: No hyphemas   Cardiovascular:      Rate and Rhythm: Normal rate and regular rhythm.      Heart sounds: Normal heart

## 2024-10-30 ENCOUNTER — TELEPHONE (OUTPATIENT)
Dept: FAMILY MEDICINE CLINIC | Age: 64
End: 2024-10-30

## 2024-10-30 ENCOUNTER — TELEPHONE (OUTPATIENT)
Dept: ADMINISTRATIVE | Age: 64
End: 2024-10-30

## 2024-10-30 DIAGNOSIS — S22.22XA CLOSED FRACTURE OF BODY OF STERNUM, INITIAL ENCOUNTER: Primary | ICD-10-CM

## 2024-10-30 DIAGNOSIS — E78.5 DYSLIPIDEMIA: Chronic | ICD-10-CM

## 2024-10-30 RX ORDER — ATORVASTATIN CALCIUM 10 MG/1
10 TABLET, FILM COATED ORAL NIGHTLY
Qty: 90 TABLET | Refills: 0 | Status: SHIPPED | OUTPATIENT
Start: 2024-10-30

## 2024-10-30 NOTE — TELEPHONE ENCOUNTER
I called patient informing him that the referral and insurance auth have both been done.  I gave him the phone number to Dr. Felix's of[facility] and transferred his call to schedule.

## 2024-10-30 NOTE — TELEPHONE ENCOUNTER
Patient called as a follow up from his ED visit on 10/26/24, stating he was advised to see Dr. Jeyson Felix and he's unsure why.  Patient informed me that he is scheduled for Lt Knee SX with Dr. Frederick Walsh on 11/29/24 and he's worried that his SX will be RS.  I informed patient that he was advised to have a consult w/Dr. Felix and should follow these instructions.  Patient was agreeable.    I informed patient that I will send msg for insurance auth to be done and once obtained, someone from Dr. Felix[facility] should be calling him to schedule.

## 2024-10-30 NOTE — TELEPHONE ENCOUNTER
Per Mariam ORDOÑEZ,   I did the ins auth and faxed it to [facility] office

## 2024-10-30 NOTE — TELEPHONE ENCOUNTER
Name of Medication(s) Requested:  Requested Prescriptions     Pending Prescriptions Disp Refills    atorvastatin (LIPITOR) 10 MG tablet [Pharmacy Med Name: ATORVASTATIN 10 MG TABLET] 90 tablet 0     Sig: TAKE 1 TABLET BY MOUTH EVERY DAY AT NIGHT       Medication is on current medication list Yes    Dosage and directions were verified? Yes    Quantity verified: 90 day supply     Pharmacy Verified?  Yes    Last Appointment:  8/8/2024    Future appts:  Future Appointments   Date Time Provider Department Center   11/12/2024 12:00 PM John Munoz DO [place] [facility]   11/20/2024  4:45 PM John Munoz DO [place] [facility]   11/22/2024  9:30 AM [facility] ROOM 1 [facility]        (If no appt send self scheduling link. .REFILLAPPT)  Scheduling request sent?     [] Yes  [x] No    Does patient need updated?  [] Yes  [x] No

## 2024-10-30 NOTE — TELEPHONE ENCOUNTER
Pt called to schedule urgent referral with Dr. Felix, dx Closed fracture of body of sternum, initial encounter. Unsure if this is a dx Dr. Felix would treat. Pt was seen previously by Dr. Velazco in 2017 for a skin lesion.  Staff unavailable due to pt care.  Please contact pt.

## 2024-11-06 ENCOUNTER — OFFICE VISIT (OUTPATIENT)
Dept: FAMILY MEDICINE CLINIC | Age: 64
End: 2024-11-06

## 2024-11-06 VITALS
OXYGEN SATURATION: 97 % | WEIGHT: 201 LBS | BODY MASS INDEX: 28.14 KG/M2 | HEIGHT: 71 IN | DIASTOLIC BLOOD PRESSURE: 80 MMHG | SYSTOLIC BLOOD PRESSURE: 124 MMHG | RESPIRATION RATE: 16 BRPM | HEART RATE: 69 BPM | TEMPERATURE: 97.5 F

## 2024-11-06 DIAGNOSIS — S22.22XS CLOSED FRACTURE OF BODY OF STERNUM, SEQUELA: Primary | ICD-10-CM

## 2024-11-06 ASSESSMENT — ENCOUNTER SYMPTOMS
COUGH: 0
BACK PAIN: 1
SHORTNESS OF BREATH: 0
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
## 2024-11-12 ENCOUNTER — OFFICE VISIT (OUTPATIENT)
Dept: FAMILY MEDICINE CLINIC | Age: 64
End: 2024-11-12

## 2024-11-12 VITALS
RESPIRATION RATE: 20 BRPM | BODY MASS INDEX: 28.2 KG/M2 | SYSTOLIC BLOOD PRESSURE: 134 MMHG | HEART RATE: 75 BPM | HEIGHT: 70 IN | OXYGEN SATURATION: 97 % | DIASTOLIC BLOOD PRESSURE: 84 MMHG | WEIGHT: 197 LBS

## 2024-11-12 DIAGNOSIS — K21.9 GASTROESOPHAGEAL REFLUX DISEASE WITHOUT ESOPHAGITIS: Chronic | ICD-10-CM

## 2024-11-12 DIAGNOSIS — Z01.818 PRE-OP TESTING: Primary | ICD-10-CM

## 2024-11-12 DIAGNOSIS — R29.6 RECURRENT FALLS: ICD-10-CM

## 2024-11-12 DIAGNOSIS — E78.5 DYSLIPIDEMIA: ICD-10-CM

## 2024-11-12 DIAGNOSIS — G40.909 SEIZURE DISORDER (HCC): ICD-10-CM

## 2024-11-12 DIAGNOSIS — E55.9 VITAMIN D DEFICIENCY: ICD-10-CM

## 2024-11-12 DIAGNOSIS — Z12.5 PROSTATE CANCER SCREENING: ICD-10-CM

## 2024-11-12 DIAGNOSIS — G89.29 CHRONIC PAIN OF LEFT KNEE: ICD-10-CM

## 2024-11-12 DIAGNOSIS — K21.9 GASTROESOPHAGEAL REFLUX DISEASE WITHOUT ESOPHAGITIS: ICD-10-CM

## 2024-11-12 DIAGNOSIS — M25.562 CHRONIC PAIN OF LEFT KNEE: ICD-10-CM

## 2024-11-12 LAB
BASOPHILS ABSOLUTE: 0.03 K/UL (ref 0–0.2)
BASOPHILS RELATIVE PERCENT: 1 % (ref 0–2)
EOSINOPHILS ABSOLUTE: 0.12 K/UL (ref 0.05–0.5)
EOSINOPHILS RELATIVE PERCENT: 2 % (ref 0–6)
HCT VFR BLD CALC: 43.2 % (ref 37–54)
HEMOGLOBIN: 14.6 G/DL (ref 12.5–16.5)
IMMATURE GRANULOCYTES %: 0 % (ref 0–5)
IMMATURE GRANULOCYTES ABSOLUTE: <0.03 K/UL (ref 0–0.58)
LYMPHOCYTES ABSOLUTE: 1.31 K/UL (ref 1.5–4)
LYMPHOCYTES RELATIVE PERCENT: 25 % (ref 20–42)
MCH RBC QN AUTO: 32.6 PG (ref 26–35)
MCHC RBC AUTO-ENTMCNC: 33.8 G/DL (ref 32–34.5)
MCV RBC AUTO: 96.4 FL (ref 80–99.9)
MONOCYTES ABSOLUTE: 0.69 K/UL (ref 0.1–0.95)
MONOCYTES RELATIVE PERCENT: 13 % (ref 2–12)
NEUTROPHILS ABSOLUTE: 3.17 K/UL (ref 1.8–7.3)
NEUTROPHILS RELATIVE PERCENT: 59 % (ref 43–80)
PDW BLD-RTO: 14.9 % (ref 11.5–15)
PLATELET # BLD: 274 K/UL (ref 130–450)
PMV BLD AUTO: 11.8 FL (ref 7–12)
RBC # BLD: 4.48 M/UL (ref 3.8–5.8)
WBC # BLD: 5.3 K/UL (ref 4.5–11.5)

## 2024-11-12 ASSESSMENT — ENCOUNTER SYMPTOMS
BLOOD IN STOOL: 0
CHEST TIGHTNESS: 0
SHORTNESS OF BREATH: 0
BACK PAIN: 1
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
## 2024-11-13 LAB
ALBUMIN: 4 G/DL (ref 3.5–5.2)
ALP BLD-CCNC: 193 U/L (ref 40–129)
ALT SERPL-CCNC: 15 U/L (ref 0–40)
ANION GAP SERPL CALCULATED.3IONS-SCNC: 15 MMOL/L (ref 7–16)
AST SERPL-CCNC: 22 U/L (ref 0–39)
BILIRUB SERPL-MCNC: 0.2 MG/DL (ref 0–1.2)
BUN BLDV-MCNC: 13 MG/DL (ref 6–23)
CALCIUM SERPL-MCNC: 9.4 MG/DL (ref 8.6–10.2)
CHLORIDE BLD-SCNC: 104 MMOL/L (ref 98–107)
CHOLESTEROL, TOTAL: 205 MG/DL
CO2: 23 MMOL/L (ref 22–29)
CREAT SERPL-MCNC: 0.7 MG/DL (ref 0.7–1.2)
GFR, ESTIMATED: >90 ML/MIN/1.73M2
GLUCOSE BLD-MCNC: 86 MG/DL (ref 74–99)
HDLC SERPL-MCNC: 78 MG/DL
LDL CHOLESTEROL: 108 MG/DL
PHENYTOIN DATE LAST DOSE: 0
PHENYTOIN DOSE AMOUNT: 0
PHENYTOIN DOSE TIME: 0
PHENYTOIN LEVEL: 16.9 UG/ML (ref 10–20)
POTASSIUM SERPL-SCNC: 4.1 MMOL/L (ref 3.5–5)
PROSTATE SPECIFIC ANTIGEN: 2.98 NG/ML (ref 0–4)
SODIUM BLD-SCNC: 142 MMOL/L (ref 132–146)
TOTAL PROTEIN: 7.1 G/DL (ref 6.4–8.3)
TRIGL SERPL-MCNC: 97 MG/DL
VITAMIN D 25-HYDROXY: 42.3 NG/ML (ref 30–100)
VLDLC SERPL CALC-MCNC: 19 MG/DL

## 2024-11-14 DIAGNOSIS — E78.5 DYSLIPIDEMIA: Chronic | ICD-10-CM

## 2024-11-14 RX ORDER — ATORVASTATIN CALCIUM 10 MG/1
10 TABLET, FILM COATED ORAL NIGHTLY
Qty: 90 TABLET | Refills: 0 | Status: SHIPPED | OUTPATIENT
Start: 2024-11-14

## 2024-11-14 NOTE — TELEPHONE ENCOUNTER
Name of Medication(s) Requested:  Requested Prescriptions     Pending Prescriptions Disp Refills    atorvastatin (LIPITOR) 10 MG tablet [Pharmacy Med Name: ATORVASTATIN 10 MG TABLET] 90 tablet 0     Sig: TAKE 1 TABLET BY MOUTH EVERY DAY AT NIGHT       Medication is on current medication list Yes    Dosage and directions were verified? Yes    Quantity verified: 90 day supply     Pharmacy Verified?  Yes    Last Appointment:  11/12/2024    Future appts:  Future Appointments   Date Time Provider Department Center   11/20/2024  4:45 PM John Munoz DO [place] [facility]   11/22/2024  9:30 AM [facility] ROOM 1 [facility]        (If no appt send self scheduling link. .REFILLAPPT)  Scheduling request sent?     [] Yes  [x] No    Does patient need updated?  [] Yes  [x] No

## 2024-11-20 ENCOUNTER — OFFICE VISIT (OUTPATIENT)
Dept: FAMILY MEDICINE CLINIC | Age: 64
End: 2024-11-20

## 2024-11-20 VITALS
HEART RATE: 78 BPM | BODY MASS INDEX: 28.77 KG/M2 | SYSTOLIC BLOOD PRESSURE: 104 MMHG | OXYGEN SATURATION: 98 % | WEIGHT: 201 LBS | DIASTOLIC BLOOD PRESSURE: 70 MMHG | HEIGHT: 70 IN | RESPIRATION RATE: 16 BRPM | TEMPERATURE: 97.4 F

## 2024-11-20 DIAGNOSIS — K21.9 GASTROESOPHAGEAL REFLUX DISEASE WITHOUT ESOPHAGITIS: Primary | ICD-10-CM

## 2024-11-20 DIAGNOSIS — G40.909 SEIZURE DISORDER (HCC): ICD-10-CM

## 2024-11-20 DIAGNOSIS — Z12.5 PROSTATE CANCER SCREENING: ICD-10-CM

## 2024-11-20 DIAGNOSIS — E78.5 DYSLIPIDEMIA: Chronic | ICD-10-CM

## 2024-11-20 DIAGNOSIS — E55.9 VITAMIN D DEFICIENCY: Chronic | ICD-10-CM

## 2024-11-20 DIAGNOSIS — G47.33 OBSTRUCTIVE SLEEP APNEA: Chronic | ICD-10-CM

## 2024-11-20 ASSESSMENT — ENCOUNTER SYMPTOMS
CHEST TIGHTNESS: 0
SHORTNESS OF BREATH: 0
BACK PAIN: 1
BLOOD IN STOOL: 0

## 2024-11-20 NOTE — PROGRESS NOTES
of Occupational Health - Occupational Stress Questionnaire     Feeling of Stress : To some extent   Social Connections: Moderately Isolated (8/6/2019)    Social Connection and Isolation Panel [NHANES]     Frequency of Communication with Friends and Family: More than three times a week     Frequency of Social Gatherings with Friends and Family: More than three times a week     Attends [employer] Services: More than 4 times per year     Active Member of Clubs or Organizations: No     Attends Club or Organization Meetings: Never     Marital Status:    Intimate Partner Violence: Not At Risk (8/6/2019)    Humiliation, Afraid, Rape, and Kick questionnaire     Fear of Current or Ex-Partner: No     Emotionally Abused: No     Physically Abused: No     Sexually Abused: No   Housing Stability: Unknown (4/22/2024)    Housing Stability Vital Sign     Unstable Housing in the Last Year: No       OBJECTIVE    /70   Pulse 78   Temp 97.4 °F (36.3 °C) (Temporal)   Resp 16   Ht 1.778 m (5' 10\")   Wt 91.2 kg (201 lb)   SpO2 98%   BMI 28.84 kg/m²     Wt Readings from Last 3 Encounters:   11/20/24 91.2 kg (201 lb)   11/12/24 89.4 kg (197 lb)   11/06/24 91.2 kg (201 lb)       Physical Exam  Constitutional:       General: He is not in acute distress.     Appearance: Normal appearance.   HENT:      Head: Normocephalic and atraumatic.   Eyes:      Conjunctiva/sclera: Conjunctivae normal.   Neck:      Thyroid: No thyroid mass or thyromegaly.      Vascular: No carotid bruit.   Cardiovascular:      Rate and Rhythm: Normal rate and regular rhythm.      Heart sounds: Normal heart sounds.   Pulmonary:      Effort: Pulmonary effort is normal.      Breath sounds: Normal breath sounds.   Abdominal:      General: There is no abdominal bruit.   Musculoskeletal:      Right lower leg: No edema.      Left lower leg: No edema.      Comments: AFO in place   Skin:     General: Skin is warm and dry.   Neurological:      General: No focal

## 2024-11-21 ENCOUNTER — ANESTHESIA EVENT (OUTPATIENT)
Dept: OPERATING ROOM | Age: 64
End: 2024-11-21
Payer: MEDICARE

## 2024-11-21 RX ORDER — ROPIVACAINE HYDROCHLORIDE 5 MG/ML
30 INJECTION, SOLUTION EPIDURAL; INFILTRATION; PERINEURAL
OUTPATIENT
Start: 2024-11-21 | End: 2024-11-22

## 2024-11-21 RX ORDER — MIDAZOLAM HYDROCHLORIDE 2 MG/2ML
0.5 INJECTION, SOLUTION INTRAMUSCULAR; INTRAVENOUS PRN
OUTPATIENT
Start: 2024-11-21

## 2024-11-21 RX ORDER — FENTANYL CITRATE 50 UG/ML
25 INJECTION, SOLUTION INTRAMUSCULAR; INTRAVENOUS PRN
OUTPATIENT
Start: 2024-11-21
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
## 2024-11-22 ENCOUNTER — HOSPITAL ENCOUNTER (OUTPATIENT)
Dept: PREADMISSION TESTING | Age: 64
Discharge: HOME | End: 2024-11-22
Payer: MEDICARE

## 2024-11-22 VITALS
HEART RATE: 56 BPM | SYSTOLIC BLOOD PRESSURE: 134 MMHG | OXYGEN SATURATION: 95 % | BODY MASS INDEX: 27.44 KG/M2 | WEIGHT: 196 LBS | DIASTOLIC BLOOD PRESSURE: 71 MMHG | RESPIRATION RATE: 12 BRPM | HEIGHT: 71 IN | TEMPERATURE: 98 F

## 2024-11-22 LAB
ANION GAP SERPL CALCULATED.3IONS-SCNC: 11 MMOL/L (ref 7–16)
BUN SERPL-MCNC: 13 MG/DL (ref 6–23)
CALCIUM SERPL-MCNC: 8.9 MG/DL (ref 8.6–10.2)
CHLORIDE SERPL-SCNC: 101 MMOL/L (ref 98–107)
CO2 SERPL-SCNC: 26 MMOL/L (ref 22–29)
CREAT SERPL-MCNC: 0.7 MG/DL (ref 0.7–1.2)
ERYTHROCYTE [DISTWIDTH] IN BLOOD BY AUTOMATED COUNT: 14.6 % (ref 11.5–15)
GFR, ESTIMATED: >90 ML/MIN/1.73M2
GLUCOSE SERPL-MCNC: 98 MG/DL (ref 74–99)
HCT VFR BLD AUTO: 42.4 % (ref 37–54)
HGB BLD-MCNC: 14.8 G/DL (ref 12.5–16.5)
MCH RBC QN AUTO: 32.7 PG (ref 26–35)
MCHC RBC AUTO-ENTMCNC: 34.9 G/DL (ref 32–34.5)
MCV RBC AUTO: 93.8 FL (ref 80–99.9)
PLATELET # BLD AUTO: 258 K/UL (ref 130–450)
PMV BLD AUTO: 10.5 FL (ref 7–12)
POTASSIUM SERPL-SCNC: 3.9 MMOL/L (ref 3.5–5)
RBC # BLD AUTO: 4.52 M/UL (ref 3.8–5.8)
SODIUM SERPL-SCNC: 138 MMOL/L (ref 132–146)
WBC OTHER # BLD: 4.9 K/UL (ref 4.5–11.5)

## 2024-11-22 PROCEDURE — 87081 CULTURE SCREEN ONLY: CPT

## 2024-11-22 PROCEDURE — 80048 BASIC METABOLIC PNL TOTAL CA: CPT

## 2024-11-22 PROCEDURE — 36415 COLL VENOUS BLD VENIPUNCTURE: CPT

## 2024-11-22 PROCEDURE — 85027 COMPLETE CBC AUTOMATED: CPT

## 2024-11-22 RX ORDER — ACETAMINOPHEN 500 MG
500 TABLET ORAL EVERY 4 HOURS PRN
COMMUNITY

## 2024-11-22 RX ORDER — M-VIT,TX,IRON,MINS/CALC/FOLIC 27MG-0.4MG
1 TABLET ORAL DAILY
COMMUNITY

## 2024-11-22 ASSESSMENT — PROMIS GLOBAL HEALTH SCALE
IN GENERAL, WOULD YOU SAY YOUR QUALITY OF LIFE IS...[ON A SCALE OF 1 (POOR) TO 5 (EXCELLENT)]: EXCELLENT
WHO IS THE PERSON COMPLETING THE PROMIS V1.1 SURVEY?: SELF
IN THE PAST 7 DAYS, HOW OFTEN HAVE YOU BEEN BOTHERED BY EMOTIONAL PROBLEMS, SUCH AS FEELING ANXIOUS, DEPRESSED, OR IRRITABLE [ON A SCALE FROM 1 (NEVER) TO 5 (ALWAYS)]?: NEVER
TO WHAT EXTENT ARE YOU ABLE TO CARRY OUT YOUR EVERYDAY PHYSICAL ACTIVITIES SUCH AS WALKING, CLIMBING STAIRS, CARRYING GROCERIES, OR MOVING A CHAIR [ON A SCALE OF 1 (NOT AT ALL) TO 5 (COMPLETELY)]?: A LITTLE
SUM OF RESPONSES TO QUESTIONS 3, 6, 7, & 8: 17
IN GENERAL, PLEASE RATE HOW WELL YOU CARRY OUT YOUR USUAL SOCIAL ACTIVITIES (INCLUDES ACTIVITIES AT HOME, AT WORK, AND IN YOUR COMMUNITY, AND RESPONSIBILITIES AS A PARENT, CHILD, SPOUSE, EMPLOYEE, FRIEND, ETC) [ON A SCALE OF 1 (POOR) TO 5 (EXCELLENT)]?: GOOD
IN GENERAL, HOW WOULD YOU RATE YOUR PHYSICAL HEALTH [ON A SCALE OF 1 (POOR) TO 5 (EXCELLENT)]?: FAIR
IN GENERAL, HOW WOULD YOU RATE YOUR MENTAL HEALTH, INCLUDING YOUR MOOD AND YOUR ABILITY TO THINK [ON A SCALE OF 1 (POOR) TO 5 (EXCELLENT)]?: VERY GOOD
IN GENERAL, HOW WOULD YOU RATE YOUR SATISFACTION WITH YOUR SOCIAL ACTIVITIES AND RELATIONSHIPS [ON A SCALE OF 1 (POOR) TO 5 (EXCELLENT)]?: VERY GOOD
IN GENERAL, WOULD YOU SAY YOUR HEALTH IS...[ON A SCALE OF 1 (POOR) TO 5 (EXCELLENT)]: GOOD
SUM OF RESPONSES TO QUESTIONS 2, 4, 5, & 10: 18
HOW IS THE PROMIS V1.1 BEING ADMINISTERED?: ELECTRONIC
IN THE PAST 7 DAYS, HOW WOULD YOU RATE YOUR PAIN ON AVERAGE [ON A SCALE FROM 0 (NO PAIN) TO 10 (WORST IMAGINABLE PAIN)]?: 8

## 2024-11-22 ASSESSMENT — PAIN DESCRIPTION - ONSET: ONSET: ON-GOING

## 2024-11-22 ASSESSMENT — KOOS JR
GOING UP OR DOWN STAIRS: MILD
TWISING OR PIVOTING ON KNEE: MODERATE
KOOS JR TOTAL INTERVAL SCORE: 73.342
HOW SEVERE IS YOUR KNEE STIFFNESS AFTER FIRST WAKING IN MORNING: MILD
STANDING UPRIGHT: MILD

## 2024-11-22 ASSESSMENT — PAIN DESCRIPTION - FREQUENCY: FREQUENCY: CONTINUOUS

## 2024-11-22 ASSESSMENT — PAIN DESCRIPTION - ORIENTATION: ORIENTATION: LEFT

## 2024-11-22 ASSESSMENT — PAIN SCALES - GENERAL: PAINLEVEL_OUTOF10: 10

## 2024-11-22 ASSESSMENT — PAIN DESCRIPTION - PAIN TYPE: TYPE: CHRONIC PAIN

## 2024-11-22 ASSESSMENT — PAIN DESCRIPTION - LOCATION: LOCATION: KNEE

## 2024-11-22 ASSESSMENT — PAIN - FUNCTIONAL ASSESSMENT: PAIN_FUNCTIONAL_ASSESSMENT: PREVENTS OR INTERFERES WITH MANY ACTIVE NOT PASSIVE ACTIVITIES

## 2024-11-22 ASSESSMENT — PAIN DESCRIPTION - DESCRIPTORS: DESCRIPTORS: SHARP;ACHING

## 2024-11-22 NOTE — PROGRESS NOTES
[facility] PRE-ADMISSION TESTING INSTRUCTIONS    The Preadmission Testing patient is instructed accordingly using the following criteria (check applicable):    ARRIVAL INSTRUCTIONS:  [x] Parking the day of Surgery is located in the Main Entrance lot.  Upon entering the door, make an immediate right to the surgery reception desk    [x] Bring photo ID and insurance card    [x] Please be sure to arrange for responsible adult to provide transportation to and from the hospital    [x] Please arrange for responsible adult to be with you for the 24 hour period post procedure due to having anesthesia    [x] If you awake am of surgery not feeling well or have temperature >100 please call [phone]    GENERAL INSTRUCTIONS:    [x] Follow instructions for hydration that have been provided to you at your Pre-Admission Visit. Solid food until midnight then clear liquids. No gum, candy or mints.    [x] You may brush your teeth, but do not swallow any water    [x] Take medications as instructed with 1-2 oz of water    [x] Stop herbal supplements and vitamins 5 days prior to procedur    [x] Shower or bath with soap, lather and rinse well, AM of Surgery, no lotion, powders or creams to surgical site    [x] No tobacco products within 24 hours of surgery     [x] No alcohol or illegal drug use within 24 hours of surgery.    [x] Jewelry, body piercing's, eyeglasses, contact lenses and dentures are not permitted into surgery (bring cases)      [x] Please do not wear any nail polish, make up or hair products on the day of surgery    [x] You can expect a call the business day prior to procedure to notify you if your arrival time changes    [x] If you receive a survey after surgery we would greatly appreciate your comment    [x] Please notify surgeon if you develop any illness between now and time of surgery (cold, cough, sore throat, fever, nausea, vomiting) or any signs of infections  including skin, wounds,

## 2024-11-22 NOTE — DISCHARGE INSTRUCTIONS
[facility] PRE-ADMISSION TESTING INSTRUCTIONS     The Preadmission Testing patient is instructed accordingly using the following criteria (check applicable):     ARRIVAL INSTRUCTIONS:  [x] Parking the day of Surgery is located in the Main Entrance lot.  Upon entering the door, make an immediate right to the surgery reception desk     [x] Bring photo ID and insurance card     [x] Please be sure to arrange for responsible adult to provide transportation to and from the hospital     [x] Please arrange for responsible adult to be with you for the 24 hour period post procedure due to having anesthesia     [x] If you awake am of surgery not feeling well or have temperature >100 please call [phone]     GENERAL INSTRUCTIONS:     [x] Follow instructions for hydration that have been provided to you at your Pre-Admission Visit. Solid food until midnight then clear liquids. No gum, candy or mints.     [x] You may brush your teeth, but do not swallow any water     [x] Take medications as instructed with 1-2 oz of water     [x] Stop herbal supplements and vitamins 5 days prior to procedur     [x] Shower or bath with soap, lather and rinse well, AM of Surgery, no lotion, powders or creams to surgical site     [x] No tobacco products within 24 hours of surgery      [x] No alcohol or illegal drug use within 24 hours of surgery.     [x] Jewelry, body piercing's, eyeglasses, contact lenses and dentures are not permitted into surgery (bring cases)                            [x] Please do not wear any nail polish, make up or hair products on the day of surgery     [x] You can expect a call the business day prior to procedure to notify you if your arrival time changes     [x] If you receive a survey after surgery we would greatly appreciate your comment     [x] Please notify surgeon if you develop any illness between now and time of surgery (cold, cough, sore throat, fever, nausea, vomiting) or any signs

## 2024-11-22 NOTE — PROGRESS NOTES
Pt scheduled for TLKA 11/29/24 under general anesthesia.  In PAT today for preop.  Dr. Reyes notified of pt's fall 2 weeks ago (pt has foot drop) resulting in fractured sternum.  Medical clearance given per Dr. Adkins.  Patient is not SOB, lungs are clear to auscultation with symmetrical excursion.  Pulse ox 95%.

## 2024-11-24 LAB
MICROORGANISM SPEC CULT: NORMAL
SPECIMEN DESCRIPTION: NORMAL

## 2024-11-26 DIAGNOSIS — K21.9 GASTROESOPHAGEAL REFLUX DISEASE WITHOUT ESOPHAGITIS: Chronic | ICD-10-CM

## 2024-11-26 NOTE — TELEPHONE ENCOUNTER
Name of Medication(s) Requested:  Requested Prescriptions     Pending Prescriptions Disp Refills    omeprazole (PRILOSEC) 20 MG delayed release capsule [Pharmacy Med Name: OMEPRAZOLE DR 20 MG CAPSULE] 90 capsule 1     Sig: TAKE 1 CAPSULE BY MOUTH EVERY DAY       Medication is on current medication list Yes    Dosage and directions were verified? Yes    Quantity verified: 90 day supply     Pharmacy Verified?  Yes    Last Appointment:  11/20/2024    Future appts:  Future Appointments   Date Time Provider Department Center   5/20/2025  3:45 PM John Munoz DO [place] [facility]        (If no appt send self scheduling link. .REFILLAPPT)  Scheduling request sent?     [] Yes  [x] No    Does patient need updated?  [] Yes  [x] No

## 2024-11-29 ENCOUNTER — ANESTHESIA (OUTPATIENT)
Dept: OPERATING ROOM | Age: 64
End: 2024-11-29
Payer: MEDICARE

## 2024-11-29 ENCOUNTER — HOSPITAL ENCOUNTER (OUTPATIENT)
Age: 64
Setting detail: OBSERVATION
Discharge: HOME | End: 2024-11-30
Attending: GENERAL ACUTE CARE HOSPITAL | Admitting: GENERAL ACUTE CARE HOSPITAL
Payer: MEDICARE

## 2024-11-29 ENCOUNTER — APPOINTMENT (OUTPATIENT)
Dept: GENERAL RADIOLOGY | Age: 64
End: 2024-11-29
Attending: GENERAL ACUTE CARE HOSPITAL
Payer: MEDICARE

## 2024-11-29 DIAGNOSIS — M17.12 OSTEOARTHRITIS OF LEFT KNEE, UNSPECIFIED OSTEOARTHRITIS TYPE: ICD-10-CM

## 2024-11-29 DIAGNOSIS — Z96.652 STATUS POST TOTAL LEFT KNEE REPLACEMENT: Primary | ICD-10-CM

## 2024-11-29 PROCEDURE — 6360000002 HC RX W HCPCS: Performed by: ANESTHESIOLOGY

## 2024-11-29 PROCEDURE — 88311 DECALCIFY TISSUE: CPT

## 2024-11-29 PROCEDURE — 2709999900 HC NON-CHARGEABLE SUPPLY: Performed by: GENERAL ACUTE CARE HOSPITAL

## 2024-11-29 PROCEDURE — 2580000003 HC RX 258: Performed by: GENERAL ACUTE CARE HOSPITAL

## 2024-11-29 PROCEDURE — 6370000000 HC RX 637 (ALT 250 FOR IP): Performed by: GENERAL ACUTE CARE HOSPITAL

## 2024-11-29 PROCEDURE — 3700000001 HC ADD 15 MINUTES (ANESTHESIA): Performed by: GENERAL ACUTE CARE HOSPITAL

## 2024-11-29 PROCEDURE — 88305 TISSUE EXAM BY PATHOLOGIST: CPT

## 2024-11-29 PROCEDURE — 3700000000 HC ANESTHESIA ATTENDED CARE: Performed by: GENERAL ACUTE CARE HOSPITAL

## 2024-11-29 PROCEDURE — G0378 HOSPITAL OBSERVATION PER HR: HCPCS

## 2024-11-29 PROCEDURE — 73560 X-RAY EXAM OF KNEE 1 OR 2: CPT

## 2024-11-29 PROCEDURE — 6360000002 HC RX W HCPCS: Performed by: STUDENT IN AN ORGANIZED HEALTH CARE EDUCATION/TRAINING PROGRAM

## 2024-11-29 PROCEDURE — 2500000003 HC RX 250 WO HCPCS: Performed by: STUDENT IN AN ORGANIZED HEALTH CARE EDUCATION/TRAINING PROGRAM

## 2024-11-29 PROCEDURE — 64447 NJX AA&/STRD FEMORAL NRV IMG: CPT | Performed by: STUDENT IN AN ORGANIZED HEALTH CARE EDUCATION/TRAINING PROGRAM

## 2024-11-29 PROCEDURE — 97165 OT EVAL LOW COMPLEX 30 MIN: CPT

## 2024-11-29 PROCEDURE — 6360000002 HC RX W HCPCS: Performed by: GENERAL ACUTE CARE HOSPITAL

## 2024-11-29 PROCEDURE — C1713 ANCHOR/SCREW BN/BN,TIS/BN: HCPCS | Performed by: GENERAL ACUTE CARE HOSPITAL

## 2024-11-29 PROCEDURE — C1776 JOINT DEVICE (IMPLANTABLE): HCPCS | Performed by: GENERAL ACUTE CARE HOSPITAL

## 2024-11-29 PROCEDURE — 3600000004 HC SURGERY LEVEL 4 BASE: Performed by: GENERAL ACUTE CARE HOSPITAL

## 2024-11-29 PROCEDURE — 7100000001 HC PACU RECOVERY - ADDTL 15 MIN: Performed by: GENERAL ACUTE CARE HOSPITAL

## 2024-11-29 PROCEDURE — 97162 PT EVAL MOD COMPLEX 30 MIN: CPT

## 2024-11-29 PROCEDURE — 7100000000 HC PACU RECOVERY - FIRST 15 MIN: Performed by: GENERAL ACUTE CARE HOSPITAL

## 2024-11-29 PROCEDURE — 2720000010 HC SURG SUPPLY STERILE: Performed by: GENERAL ACUTE CARE HOSPITAL

## 2024-11-29 PROCEDURE — 3600000014 HC SURGERY LEVEL 4 ADDTL 15MIN: Performed by: GENERAL ACUTE CARE HOSPITAL

## 2024-11-29 PROCEDURE — 97530 THERAPEUTIC ACTIVITIES: CPT

## 2024-11-29 PROCEDURE — 2500000003 HC RX 250 WO HCPCS: Performed by: GENERAL ACUTE CARE HOSPITAL

## 2024-11-29 PROCEDURE — 2720000001 HC MISC SURG SUPPLY STERILE $51-500: Performed by: GENERAL ACUTE CARE HOSPITAL

## 2024-11-29 DEVICE — IMPLANTABLE DEVICE
Type: IMPLANTABLE DEVICE | Site: KNEE | Status: FUNCTIONAL
Brand: REFOBACIN® BONE CEMENT R

## 2024-11-29 DEVICE — IMPLANTABLE DEVICE
Type: IMPLANTABLE DEVICE | Site: KNEE | Status: FUNCTIONAL
Brand: PERSONA®

## 2024-11-29 DEVICE — IMPLANTABLE DEVICE
Type: IMPLANTABLE DEVICE | Site: KNEE | Status: FUNCTIONAL
Brand: NEXGEN®

## 2024-11-29 DEVICE — DUP USE 339820 IMPL KNEE PSN TIB STM 5 DEG SZ G L: Type: IMPLANTABLE DEVICE | Site: KNEE | Status: FUNCTIONAL

## 2024-11-29 DEVICE — IMPLANTABLE DEVICE
Type: IMPLANTABLE DEVICE | Site: KNEE | Status: FUNCTIONAL
Brand: PERSONA® VIVACIT-E®

## 2024-11-29 RX ORDER — FENTANYL CITRATE 50 UG/ML
25 INJECTION, SOLUTION INTRAMUSCULAR; INTRAVENOUS PRN
Status: DISCONTINUED | OUTPATIENT
Start: 2024-11-29 | End: 2024-11-29 | Stop reason: HOSPADM

## 2024-11-29 RX ORDER — SODIUM CHLORIDE 0.9 % (FLUSH) 0.9 %
5-40 SYRINGE (ML) INJECTION EVERY 12 HOURS SCHEDULED
Status: DISCONTINUED | OUTPATIENT
Start: 2024-11-29 | End: 2024-11-29 | Stop reason: HOSPADM

## 2024-11-29 RX ORDER — LABETALOL HYDROCHLORIDE 5 MG/ML
5 INJECTION, SOLUTION INTRAVENOUS
Status: DISCONTINUED | OUTPATIENT
Start: 2024-11-29 | End: 2024-11-29 | Stop reason: HOSPADM

## 2024-11-29 RX ORDER — ONDANSETRON 2 MG/ML
INJECTION INTRAMUSCULAR; INTRAVENOUS
Status: DISCONTINUED | OUTPATIENT
Start: 2024-11-29 | End: 2024-11-29 | Stop reason: SDUPTHER

## 2024-11-29 RX ORDER — KETOROLAC TROMETHAMINE 30 MG/ML
INJECTION, SOLUTION INTRAMUSCULAR; INTRAVENOUS
Status: DISCONTINUED | OUTPATIENT
Start: 2024-11-29 | End: 2024-11-29 | Stop reason: SDUPTHER

## 2024-11-29 RX ORDER — MORPHINE SULFATE 4 MG/ML
4 INJECTION, SOLUTION INTRAMUSCULAR; INTRAVENOUS
Status: DISCONTINUED | OUTPATIENT
Start: 2024-11-29 | End: 2024-11-30 | Stop reason: HOSPADM

## 2024-11-29 RX ORDER — FENTANYL CITRATE 50 UG/ML
INJECTION, SOLUTION INTRAMUSCULAR; INTRAVENOUS
Status: DISCONTINUED | OUTPATIENT
Start: 2024-11-29 | End: 2024-11-29 | Stop reason: SDUPTHER

## 2024-11-29 RX ORDER — POLYETHYLENE GLYCOL 3350 17 G/17G
17 POWDER, FOR SOLUTION ORAL DAILY
Status: DISCONTINUED | OUTPATIENT
Start: 2024-11-29 | End: 2024-11-30 | Stop reason: HOSPADM

## 2024-11-29 RX ORDER — VANCOMYCIN HYDROCHLORIDE 1 G/20ML
INJECTION, POWDER, LYOPHILIZED, FOR SOLUTION INTRAVENOUS PRN
Status: DISCONTINUED | OUTPATIENT
Start: 2024-11-29 | End: 2024-11-29 | Stop reason: ALTCHOICE

## 2024-11-29 RX ORDER — SODIUM CHLORIDE 9 MG/ML
INJECTION, SOLUTION INTRAVENOUS PRN
Status: DISCONTINUED | OUTPATIENT
Start: 2024-11-29 | End: 2024-11-29 | Stop reason: HOSPADM

## 2024-11-29 RX ORDER — PHENYTOIN SODIUM 100 MG/1
100 CAPSULE, EXTENDED RELEASE ORAL 3 TIMES DAILY
Status: DISCONTINUED | OUTPATIENT
Start: 2024-11-29 | End: 2024-11-30 | Stop reason: HOSPADM

## 2024-11-29 RX ORDER — OXYCODONE HYDROCHLORIDE 5 MG/1
10 TABLET ORAL EVERY 4 HOURS PRN
Status: DISCONTINUED | OUTPATIENT
Start: 2024-11-29 | End: 2024-11-30 | Stop reason: HOSPADM

## 2024-11-29 RX ORDER — MEPERIDINE HYDROCHLORIDE 25 MG/ML
12.5 INJECTION INTRAMUSCULAR; INTRAVENOUS; SUBCUTANEOUS EVERY 5 MIN PRN
Status: DISCONTINUED | OUTPATIENT
Start: 2024-11-29 | End: 2024-11-29 | Stop reason: HOSPADM

## 2024-11-29 RX ORDER — PROCHLORPERAZINE EDISYLATE 5 MG/ML
5 INJECTION INTRAMUSCULAR; INTRAVENOUS
Status: DISCONTINUED | OUTPATIENT
Start: 2024-11-29 | End: 2024-11-29 | Stop reason: HOSPADM

## 2024-11-29 RX ORDER — NALOXONE HYDROCHLORIDE 0.4 MG/ML
INJECTION, SOLUTION INTRAMUSCULAR; INTRAVENOUS; SUBCUTANEOUS PRN
Status: DISCONTINUED | OUTPATIENT
Start: 2024-11-29 | End: 2024-11-29 | Stop reason: HOSPADM

## 2024-11-29 RX ORDER — DEXAMETHASONE SODIUM PHOSPHATE 4 MG/ML
INJECTION, SOLUTION INTRA-ARTICULAR; INTRALESIONAL; INTRAMUSCULAR; INTRAVENOUS; SOFT TISSUE
Status: DISCONTINUED | OUTPATIENT
Start: 2024-11-29 | End: 2024-11-29 | Stop reason: SDUPTHER

## 2024-11-29 RX ORDER — METHOCARBAMOL 100 MG/ML
1000 INJECTION, SOLUTION INTRAMUSCULAR; INTRAVENOUS
Status: DISCONTINUED | OUTPATIENT
Start: 2024-11-29 | End: 2024-11-29 | Stop reason: HOSPADM

## 2024-11-29 RX ORDER — DEXAMETHASONE SODIUM PHOSPHATE 10 MG/ML
8 INJECTION, SOLUTION INTRAMUSCULAR; INTRAVENOUS ONCE
Status: DISCONTINUED | OUTPATIENT
Start: 2024-11-29 | End: 2024-11-29 | Stop reason: HOSPADM

## 2024-11-29 RX ORDER — ROPIVACAINE HYDROCHLORIDE 5 MG/ML
INJECTION, SOLUTION EPIDURAL; INFILTRATION; PERINEURAL
Status: COMPLETED | OUTPATIENT
Start: 2024-11-29 | End: 2024-11-29

## 2024-11-29 RX ORDER — CHOLECALCIFEROL (VITAMIN D3) 50 MCG
2000 TABLET ORAL DAILY
Status: DISCONTINUED | OUTPATIENT
Start: 2024-11-29 | End: 2024-11-30 | Stop reason: HOSPADM

## 2024-11-29 RX ORDER — OXYCODONE HYDROCHLORIDE 5 MG/1
5 TABLET ORAL EVERY 4 HOURS PRN
Status: DISCONTINUED | OUTPATIENT
Start: 2024-11-29 | End: 2024-11-30 | Stop reason: HOSPADM

## 2024-11-29 RX ORDER — SODIUM CHLORIDE 9 MG/ML
INJECTION, SOLUTION INTRAVENOUS PRN
Status: DISCONTINUED | OUTPATIENT
Start: 2024-11-29 | End: 2024-11-30 | Stop reason: HOSPADM

## 2024-11-29 RX ORDER — ATORVASTATIN CALCIUM 10 MG/1
10 TABLET, FILM COATED ORAL NIGHTLY
Status: DISCONTINUED | OUTPATIENT
Start: 2024-11-29 | End: 2024-11-30 | Stop reason: HOSPADM

## 2024-11-29 RX ORDER — SODIUM CHLORIDE 0.9 % (FLUSH) 0.9 %
5-40 SYRINGE (ML) INJECTION PRN
Status: DISCONTINUED | OUTPATIENT
Start: 2024-11-29 | End: 2024-11-29 | Stop reason: HOSPADM

## 2024-11-29 RX ORDER — HYDRALAZINE HYDROCHLORIDE 20 MG/ML
5 INJECTION INTRAMUSCULAR; INTRAVENOUS
Status: DISCONTINUED | OUTPATIENT
Start: 2024-11-29 | End: 2024-11-29 | Stop reason: HOSPADM

## 2024-11-29 RX ORDER — DIPHENHYDRAMINE HYDROCHLORIDE 50 MG/ML
12.5 INJECTION INTRAMUSCULAR; INTRAVENOUS
Status: DISCONTINUED | OUTPATIENT
Start: 2024-11-29 | End: 2024-11-29 | Stop reason: HOSPADM

## 2024-11-29 RX ORDER — PROPOFOL 10 MG/ML
INJECTION, EMULSION INTRAVENOUS
Status: DISCONTINUED | OUTPATIENT
Start: 2024-11-29 | End: 2024-11-29 | Stop reason: SDUPTHER

## 2024-11-29 RX ORDER — LIDOCAINE HYDROCHLORIDE 20 MG/ML
INJECTION, SOLUTION EPIDURAL; INFILTRATION; INTRACAUDAL; PERINEURAL
Status: DISCONTINUED | OUTPATIENT
Start: 2024-11-29 | End: 2024-11-29 | Stop reason: SDUPTHER

## 2024-11-29 RX ORDER — TRANEXAMIC ACID 10 MG/ML
1000 INJECTION, SOLUTION INTRAVENOUS
Status: DISCONTINUED | OUTPATIENT
Start: 2024-11-29 | End: 2024-11-29 | Stop reason: ALTCHOICE

## 2024-11-29 RX ORDER — SODIUM CHLORIDE 0.9 % (FLUSH) 0.9 %
5-40 SYRINGE (ML) INJECTION EVERY 12 HOURS SCHEDULED
Status: DISCONTINUED | OUTPATIENT
Start: 2024-11-29 | End: 2024-11-30 | Stop reason: HOSPADM

## 2024-11-29 RX ORDER — MORPHINE SULFATE 2 MG/ML
2 INJECTION, SOLUTION INTRAMUSCULAR; INTRAVENOUS
Status: DISCONTINUED | OUTPATIENT
Start: 2024-11-29 | End: 2024-11-30 | Stop reason: HOSPADM

## 2024-11-29 RX ORDER — SODIUM CHLORIDE 0.9 % (FLUSH) 0.9 %
5-40 SYRINGE (ML) INJECTION PRN
Status: DISCONTINUED | OUTPATIENT
Start: 2024-11-29 | End: 2024-11-30 | Stop reason: HOSPADM

## 2024-11-29 RX ORDER — TRANEXAMIC ACID 100 MG/ML
INJECTION, SOLUTION INTRAVENOUS PRN
Status: DISCONTINUED | OUTPATIENT
Start: 2024-11-29 | End: 2024-11-29 | Stop reason: ALTCHOICE

## 2024-11-29 RX ORDER — PANTOPRAZOLE SODIUM 40 MG/1
40 TABLET, DELAYED RELEASE ORAL
Status: DISCONTINUED | OUTPATIENT
Start: 2024-11-30 | End: 2024-11-30 | Stop reason: HOSPADM

## 2024-11-29 RX ORDER — MIDAZOLAM HYDROCHLORIDE 2 MG/2ML
0.5 INJECTION, SOLUTION INTRAMUSCULAR; INTRAVENOUS PRN
Status: DISCONTINUED | OUTPATIENT
Start: 2024-11-29 | End: 2024-11-29 | Stop reason: HOSPADM

## 2024-11-29 RX ORDER — ONDANSETRON 4 MG/1
4 TABLET, ORALLY DISINTEGRATING ORAL EVERY 8 HOURS PRN
Status: DISCONTINUED | OUTPATIENT
Start: 2024-11-29 | End: 2024-11-30 | Stop reason: HOSPADM

## 2024-11-29 RX ORDER — FENTANYL CITRATE 50 UG/ML
25 INJECTION, SOLUTION INTRAMUSCULAR; INTRAVENOUS EVERY 5 MIN PRN
Status: DISCONTINUED | OUTPATIENT
Start: 2024-11-29 | End: 2024-11-29 | Stop reason: HOSPADM

## 2024-11-29 RX ORDER — PROCHLORPERAZINE EDISYLATE 5 MG/ML
10 INJECTION INTRAMUSCULAR; INTRAVENOUS EVERY 6 HOURS PRN
Status: DISCONTINUED | OUTPATIENT
Start: 2024-11-29 | End: 2024-11-30 | Stop reason: HOSPADM

## 2024-11-29 RX ORDER — ROPIVACAINE HYDROCHLORIDE 5 MG/ML
30 INJECTION, SOLUTION EPIDURAL; INFILTRATION; PERINEURAL
Status: DISCONTINUED | OUTPATIENT
Start: 2024-11-29 | End: 2024-11-29 | Stop reason: HOSPADM

## 2024-11-29 RX ORDER — ONDANSETRON 2 MG/ML
4 INJECTION INTRAMUSCULAR; INTRAVENOUS EVERY 6 HOURS PRN
Status: DISCONTINUED | OUTPATIENT
Start: 2024-11-29 | End: 2024-11-30 | Stop reason: HOSPADM

## 2024-11-29 RX ORDER — ASPIRIN 325 MG
325 TABLET, DELAYED RELEASE (ENTERIC COATED) ORAL 2 TIMES DAILY
Status: DISCONTINUED | OUTPATIENT
Start: 2024-11-29 | End: 2024-11-30 | Stop reason: HOSPADM

## 2024-11-29 RX ORDER — ROCURONIUM BROMIDE 10 MG/ML
INJECTION, SOLUTION INTRAVENOUS
Status: DISCONTINUED | OUTPATIENT
Start: 2024-11-29 | End: 2024-11-29 | Stop reason: SDUPTHER

## 2024-11-29 RX ORDER — ACETAMINOPHEN 500 MG
1000 TABLET ORAL ONCE
Status: COMPLETED | OUTPATIENT
Start: 2024-11-29 | End: 2024-11-29

## 2024-11-29 RX ADMIN — ATORVASTATIN CALCIUM 10 MG: 10 TABLET, FILM COATED ORAL at 20:26

## 2024-11-29 RX ADMIN — HYDROMORPHONE HYDROCHLORIDE 0.5 MG: 1 INJECTION, SOLUTION INTRAMUSCULAR; INTRAVENOUS; SUBCUTANEOUS at 10:09

## 2024-11-29 RX ADMIN — FENTANYL CITRATE 50 MCG: 50 INJECTION, SOLUTION INTRAMUSCULAR; INTRAVENOUS at 07:31

## 2024-11-29 RX ADMIN — ROCURONIUM BROMIDE 20 MG: 10 INJECTION, SOLUTION INTRAVENOUS at 08:20

## 2024-11-29 RX ADMIN — FENTANYL CITRATE 50 MCG: 50 INJECTION, SOLUTION INTRAMUSCULAR; INTRAVENOUS at 09:39

## 2024-11-29 RX ADMIN — ONDANSETRON 4 MG: 2 INJECTION, SOLUTION INTRAMUSCULAR; INTRAVENOUS at 14:08

## 2024-11-29 RX ADMIN — WATER 2000 MG: 1 INJECTION INTRAMUSCULAR; INTRAVENOUS; SUBCUTANEOUS at 22:30

## 2024-11-29 RX ADMIN — ROPIVACAINE HYDROCHLORIDE 30 ML: 5 INJECTION, SOLUTION EPIDURAL; INFILTRATION; PERINEURAL at 06:27

## 2024-11-29 RX ADMIN — EXTENDED PHENYTOIN SODIUM 100 MG: 100 CAPSULE ORAL at 14:04

## 2024-11-29 RX ADMIN — LIDOCAINE HYDROCHLORIDE 100 MG: 20 INJECTION, SOLUTION EPIDURAL; INFILTRATION; INTRACAUDAL; PERINEURAL at 07:12

## 2024-11-29 RX ADMIN — ROCURONIUM BROMIDE 30 MG: 10 INJECTION, SOLUTION INTRAVENOUS at 07:34

## 2024-11-29 RX ADMIN — ROCURONIUM BROMIDE 10 MG: 10 INJECTION, SOLUTION INTRAVENOUS at 08:11

## 2024-11-29 RX ADMIN — SODIUM CHLORIDE, PRESERVATIVE FREE 10 ML: 5 INJECTION INTRAVENOUS at 20:28

## 2024-11-29 RX ADMIN — ROCURONIUM BROMIDE 20 MG: 10 INJECTION, SOLUTION INTRAVENOUS at 08:51

## 2024-11-29 RX ADMIN — SODIUM CHLORIDE, PRESERVATIVE FREE 10 ML: 5 INJECTION INTRAVENOUS at 14:05

## 2024-11-29 RX ADMIN — KETOROLAC TROMETHAMINE 15 MG: 30 INJECTION, SOLUTION INTRAMUSCULAR; INTRAVENOUS at 09:11

## 2024-11-29 RX ADMIN — ASPIRIN 325 MG: 325 TABLET, COATED ORAL at 20:26

## 2024-11-29 RX ADMIN — FENTANYL CITRATE 50 MCG: 50 INJECTION INTRAMUSCULAR; INTRAVENOUS at 06:33

## 2024-11-29 RX ADMIN — Medication 2000 UNITS: at 12:33

## 2024-11-29 RX ADMIN — FENTANYL CITRATE 50 MCG: 50 INJECTION, SOLUTION INTRAMUSCULAR; INTRAVENOUS at 08:20

## 2024-11-29 RX ADMIN — ACETAMINOPHEN 1000 MG: 500 TABLET ORAL at 06:22

## 2024-11-29 RX ADMIN — ONDANSETRON 4 MG: 2 INJECTION INTRAMUSCULAR; INTRAVENOUS at 09:11

## 2024-11-29 RX ADMIN — EXTENDED PHENYTOIN SODIUM 100 MG: 100 CAPSULE ORAL at 20:26

## 2024-11-29 RX ADMIN — PROCHLORPERAZINE EDISYLATE 10 MG: 5 INJECTION INTRAMUSCULAR; INTRAVENOUS at 18:57

## 2024-11-29 RX ADMIN — ASPIRIN 325 MG: 325 TABLET, COATED ORAL at 12:33

## 2024-11-29 RX ADMIN — PROPOFOL 200 MG: 10 INJECTION, EMULSION INTRAVENOUS at 07:13

## 2024-11-29 RX ADMIN — ROCURONIUM BROMIDE 60 MG: 10 INJECTION, SOLUTION INTRAVENOUS at 07:13

## 2024-11-29 RX ADMIN — WATER 2000 MG: 1 INJECTION INTRAMUSCULAR; INTRAVENOUS; SUBCUTANEOUS at 15:15

## 2024-11-29 RX ADMIN — MIDAZOLAM HYDROCHLORIDE 1 MG: 1 INJECTION, SOLUTION INTRAMUSCULAR; INTRAVENOUS at 06:33

## 2024-11-29 RX ADMIN — DEXAMETHASONE SODIUM PHOSPHATE 8 MG: 4 INJECTION, SOLUTION INTRAMUSCULAR; INTRAVENOUS at 07:14

## 2024-11-29 RX ADMIN — WATER 2000 MG: 1 INJECTION INTRAMUSCULAR; INTRAVENOUS; SUBCUTANEOUS at 07:11

## 2024-11-29 RX ADMIN — POLYETHYLENE GLYCOL 3350 17 G: 17 POWDER, FOR SOLUTION ORAL at 12:33

## 2024-11-29 RX ADMIN — SUGAMMADEX 200 MG: 100 INJECTION, SOLUTION INTRAVENOUS at 09:26

## 2024-11-29 RX ADMIN — SODIUM CHLORIDE: 9 INJECTION, SOLUTION INTRAVENOUS at 07:09

## 2024-11-29 RX ADMIN — SODIUM CHLORIDE: 9 INJECTION, SOLUTION INTRAVENOUS at 07:59

## 2024-11-29 RX ADMIN — FENTANYL CITRATE 50 MCG: 50 INJECTION, SOLUTION INTRAMUSCULAR; INTRAVENOUS at 07:12

## 2024-11-29 RX ADMIN — FENTANYL CITRATE 50 MCG: 50 INJECTION, SOLUTION INTRAMUSCULAR; INTRAVENOUS at 07:37

## 2024-11-29 ASSESSMENT — PAIN SCALES - GENERAL
PAINLEVEL_OUTOF10: 6
PAINLEVEL_OUTOF10: 8
PAINLEVEL_OUTOF10: 0

## 2024-11-29 ASSESSMENT — PAIN DESCRIPTION - DESCRIPTORS
DESCRIPTORS: DISCOMFORT
DESCRIPTORS: DISCOMFORT
DESCRIPTORS: ACHING

## 2024-11-29 ASSESSMENT — PAIN DESCRIPTION - LOCATION: LOCATION: KNEE

## 2024-11-29 ASSESSMENT — PAIN - FUNCTIONAL ASSESSMENT
PAIN_FUNCTIONAL_ASSESSMENT: ACTIVITIES ARE NOT PREVENTED
PAIN_FUNCTIONAL_ASSESSMENT: ACTIVITIES ARE NOT PREVENTED
PAIN_FUNCTIONAL_ASSESSMENT: PREVENTS OR INTERFERES WITH MANY ACTIVE NOT PASSIVE ACTIVITIES
PAIN_FUNCTIONAL_ASSESSMENT: 0-10
PAIN_FUNCTIONAL_ASSESSMENT: 0-10

## 2024-11-29 ASSESSMENT — PAIN DESCRIPTION - PAIN TYPE: TYPE: CHRONIC PAIN

## 2024-11-29 ASSESSMENT — PAIN DESCRIPTION - ORIENTATION: ORIENTATION: LEFT
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
## 2024-11-29 NOTE — ACP (ADVANCE CARE PLANNING)
Advance Care Planning   Healthcare Decision Maker:    Primary Decision Maker: BARBIE SHOOK - Spouse - [phone]    Secondary Decision Maker: Fe Cavazos - Brother/Sister - [phone]    Supplemental (Other) Decision Maker: Pam Shook - Brother/Sister - [phone]    Click here to complete Healthcare Decision Makers including selection of the Healthcare Decision Maker Relationship (ie \"Primary\").

## 2024-11-29 NOTE — PROGRESS NOTES
Occupational Therapy    OCCUPATIONAL THERAPY INITIAL EVALUATION    [facility]   [address] [place], [place]         Date:2024                                                  Patient Name: Jemima Shook    MRN: [id number]    : 1960    Room: [address] [place] [address]      Evaluating OT: Jocelyn Hahn OTR/L   [id number]      Referring Provider:Frederick Walsh DO     Specific Provider Orders/Date:OT eval and treat 2024      Diagnosis:  Osteoarthritis of left knee, unspecified osteoarthritis type [M17.12]  Arthritis of left knee [M17.12]  Status post total left knee replacement [Z96.652]    Surgery: L TKA      Pertinent Medical History: L foot drop - wears AFO, patient reports recent sternum fracture     Precautions:  Fall Risk, WBAT L LE,  L AFO      Assessment of current deficits    [x] Functional mobility  [x]ADLs  [x] Strength               []Cognition    [x] Functional transfers   [x] IADLs         [x] Safety Awareness   [x]Endurance    [] Fine Coordination              [x] Balance      [] Vision/perception   [x]Sensation     []Gross Motor Coordination  [] ROM  [] Delirium                   [] Motor Control     OT PLAN OF CARE   OT POC based on physician orders, patient diagnosis and results of clinical assessment    Frequency/Duration  1-3 days/wk  PRN   Specific OT Treatment Interventions to include:   ADL retraining/adapted techniques and AE recommendations to increase functional independence within precautions                    Energy conservation techniques to improve tolerance for selfcare routine   Functional transfer/mobility training/DME recommendations for increased independence, safety and fall prevention         Patient/family education to increase safety and functional independence             Environmental modifications for safe mobility and completion of ADLs                             Therapeutic activity to improve      Comments: Upon arrival patient lying in bed .  At end of session, patient sitting in chair  with call light and phone within reach, all lines and tubes intact.  *ALARM On , wife present in room     Overall patient demonstrated  decreased independence and safety during completion of ADL/functional transfer/mobility tasks.  Pt would benefit from continued skilled OT to increase safety and independence with completion of ADL/IADL tasks for functional independence and quality of life.    ++ patient has concerns re pain meds and constipation-   informed nurse of patient's concerns       Comments/Treatment:      Patient practiced and was instructed on following during evaluation and OT session:          -Bed mobility - technique and safety         -Tranfers - hand placement, tech and safety         -Mobility - safety, and tech with  a device         -ADLs - tech, AE if appropriate/needed  - assist with LE dressing , patient able to bend to reach feet /  educated on dressing tech. Supportive wife          -Education: , importance of OOB activity and participating in ADLs, safety awareness, dressing tech             Rehab Potential: good for established goals     Patient / Family Goal: return home       Patient and/or family were instructed on functional diagnosis, prognosis/goals and OT plan of care. Demonstrated good  understanding.     Eval Complexity: Low    Time In: 1315  Time Out: 1345  Total Treatment Time: 10    Min Units   OT Eval Low 97165 x  1   OT Eval Medium 47278      OT Eval High 87550      OT Re-Eval 84132       Therapeutic Ex 10492      Therapeutic Activities 49230       ADL/Self Care 38645  10  1   Orthotic Management 02680       Manual 50878     Neuro Re-Ed 75224       Non-Billable Time      OT Re eval 11306        Evaluation Time additionally includes thorough review of current medical information, gathering information on past medical history/social history and prior level of function,

## 2024-11-29 NOTE — PROGRESS NOTES
4 Eyes Skin Assessment     NAME:  Jemima Shook  YOB: 1960  MEDICAL RECORD NUMBER:  [id number]    The patient is being assessed for  Admission    I agree that at least one RN has performed a thorough Head to Toe Skin Assessment on the patient. ALL assessment sites listed below have been assessed.      Areas assessed by both nurses:    Head, Face, Ears, Shoulders, Back, Chest, Arms, Elbows, Hands, Sacrum. Buttock, Coccyx, Ischium, Legs. Feet and Heels, and Under Medical Devices         Does the Patient have a Wound? No noted wound(s)       Marino Prevention initiated by RN: No  Wound Care Orders initiated by RN: No    Pressure Injury (Stage 3,4, Unstageable, DTI, NWPT, and Complex wounds) if present, place Wound referral order by RN under : No    New Ostomies, if present place, Ostomy referral order under : No     Nurse 1 eSignature: Electronically signed by Nicolasa Arellano RN on 11/29/24 at 12:47 PM EST    **SHARE this note so that the co-signing nurse can place an eSignature**    Nurse 2 eSignature: Electronically signed by Evaristo Strong RN on 11/29/24 at 5:34 PM EST

## 2024-11-29 NOTE — PROGRESS NOTES
L adductor canal block completed by Dr. Echeverria without difficulty.  Tolerated well by patient.  Wife returned to bedside.

## 2024-11-29 NOTE — OP NOTE
match that of the trial components.  The tourniquet was then deflated and an additional gram of tranexamic acid was given to the patient.  Any bleeding vessels were coagulated using Bovie cautery.  The wound was copiously irrigated once again with the Pulsavac system.    1 g of vancomycin powder was placed intra-articularly.  A standard layered closure was then performed.  Strata fix suture was used to close the arthrotomy site.  After watertight closure of the arthrotomy, I injected 1 g of topical tranexamic acid into the joint with a spinal needle.  A double-armed 0 strata fix was then used to close the deep subcutaneous tissue.  A 3-0 Monocryl was used to close the skin incision.  An additional 30 cc of cocktail anesthetic was injected into the subcutaneous tissues.  Dermabond was applied to the incision and Steri-Strips were placed.  A sterile dressing was then applied and the operative knee was wrapped with web roll and an Ace wrap.  Anesthesia was reversed and patient was taken recovery in stable condition.  There were no apparent complications.      Electronically signed by Frederick Walsh DO on 11/29/2024 at 9:08 AM

## 2024-11-29 NOTE — ANESTHESIA POSTPROCEDURE EVALUATION
Department of Anesthesiology  Postprocedure Note    Patient: Jemima Shook  MRN: [id number]  YOB: 1960  Date of evaluation: 11/29/2024    Procedure Summary       Date: 11/29/24 Room / Location: [address]    Anesthesia Start: 0705 Anesthesia Stop: 0946    Procedure: LEFT KNEE TOTAL ARTHROPLASTY (Left: Knee) Diagnosis:       Osteoarthritis of left knee, unspecified osteoarthritis type      (Osteoarthritis of left knee, unspecified osteoarthritis type [M17.12])    Surgeons: Frederick Walsh DO Responsible Provider: Rosana Reyes DO    Anesthesia Type: General ASA Status: 3            Anesthesia Type: General    Annalise Phase I: Annalise Score: 8    Annalise Phase II:      Anesthesia Post Evaluation    Patient location during evaluation: PACU  Patient participation: complete - patient participated  Level of consciousness: awake and alert  Nausea & Vomiting: no vomiting and no nausea  Cardiovascular status: hemodynamically stable  Respiratory status: acceptable and spontaneous ventilation  Hydration status: stable  Pain management: adequate    No notable events documented.

## 2024-11-29 NOTE — PROGRESS NOTES
Call placed via answering service to Dr. Walsh regarding continued nausea and vomiting, new order received.

## 2024-11-29 NOTE — ANESTHESIA PROCEDURE NOTES
Peripheral Block    Patient location during procedure: procedure area  Reason for block: post-op pain management and at surgeon's request  Start time: 11/29/2024 6:27 AM  End time: 11/29/2024 6:28 AM  Staffing  Performed: anesthesiologist   Anesthesiologist: Apollo Echeverria DO  Performed by: Apollo Echeverria DO  Authorized by: Apollo Echeverria DO    Preanesthetic Checklist  Completed: patient identified, IV checked, site marked, risks and benefits discussed, surgical/procedural consents, equipment checked, pre-op evaluation, timeout performed, anesthesia consent given, oxygen available and monitors applied/VS acknowledged  Peripheral Block   Patient position: supine  Prep: ChloraPrep  Provider prep: sterile gloves and mask  Patient monitoring: continuous pulse ox, cardiac monitor and IV access  Block type: Femoral  Adductor canal  Laterality: left  Injection technique: single-shot  Guidance: ultrasound guided    Needle   Needle type: combined needle/nerve stimulator   Needle gauge: 22 G  Needle localization: anatomical landmarks and ultrasound guidance  Needle length: 10 cm  Assessment   Injection assessment: negative aspiration for heme, no paresthesia on injection and local visualized surrounding nerve on ultrasound  Paresthesia pain: none  Slow fractionated injection: yes  Hemodynamics: stable  Outcomes: uncomplicated and patient tolerated procedure well    Additional Notes  Simple uncomplicated left ACB with US guidance.  Needle completely visualized throughout.  Negative aspiration Q 5cc.  1mg Versed & 50 mcg Fentanyl for sedation.  Medications Administered  ropivacaine (NAROPIN) injection 0.5% - Perineural   30 mL - 11/29/2024 6:27:00 AM

## 2024-11-29 NOTE — CARE COORDINATION
Social work / Discharge planning:          Social work spoke to patient's wife Patricia.    Discharge plan is home.   She states that patient has a wheeled walker at home.    Patient is not sure if he will need a raised toilet seat.    Per [facility], if raised toilet seat is needed over the weekend, order will need written and call [facility] at [phone] for delivery.     PT/OT evaluations ordered.    Patient's wife prefers [facility].    Referral made and awaiting acceptance.    Electronically signed by OANH Manning on 11/29/2024 at 1:51 PM           Patient accepted by [facility].    Will need home care orders prior to discharge.   Electronically signed by OANH Manning on 11/29/2024 at 2:46 PM
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
## 2024-11-30 VITALS
TEMPERATURE: 98.1 F | SYSTOLIC BLOOD PRESSURE: 108 MMHG | OXYGEN SATURATION: 97 % | DIASTOLIC BLOOD PRESSURE: 72 MMHG | WEIGHT: 196 LBS | BODY MASS INDEX: 28.06 KG/M2 | HEART RATE: 85 BPM | RESPIRATION RATE: 18 BRPM | HEIGHT: 70 IN

## 2024-11-30 DIAGNOSIS — G40.909 SEIZURE DISORDER (HCC): Chronic | ICD-10-CM

## 2024-11-30 PROCEDURE — G0378 HOSPITAL OBSERVATION PER HR: HCPCS

## 2024-11-30 PROCEDURE — 97116 GAIT TRAINING THERAPY: CPT

## 2024-11-30 PROCEDURE — 2580000003 HC RX 258: Performed by: GENERAL ACUTE CARE HOSPITAL

## 2024-11-30 PROCEDURE — 97530 THERAPEUTIC ACTIVITIES: CPT

## 2024-11-30 PROCEDURE — 6370000000 HC RX 637 (ALT 250 FOR IP): Performed by: GENERAL ACUTE CARE HOSPITAL

## 2024-11-30 RX ORDER — ONDANSETRON 4 MG/1
4 TABLET, ORALLY DISINTEGRATING ORAL EVERY 8 HOURS PRN
Qty: 20 TABLET | Refills: 0 | Status: SHIPPED | OUTPATIENT
Start: 2024-11-30

## 2024-11-30 RX ORDER — OXYCODONE HYDROCHLORIDE 5 MG/1
5 TABLET ORAL EVERY 4 HOURS PRN
Qty: 30 TABLET | Refills: 0 | Status: SHIPPED | OUTPATIENT
Start: 2024-11-30 | End: 2024-12-03

## 2024-11-30 RX ORDER — ASPIRIN 325 MG
325 TABLET, DELAYED RELEASE (ENTERIC COATED) ORAL 2 TIMES DAILY
Qty: 84 TABLET | Refills: 0 | Status: SHIPPED | OUTPATIENT
Start: 2024-11-30 | End: 2025-01-11

## 2024-11-30 RX ADMIN — SODIUM CHLORIDE, PRESERVATIVE FREE 10 ML: 5 INJECTION INTRAVENOUS at 09:09

## 2024-11-30 RX ADMIN — ASPIRIN 325 MG: 325 TABLET, COATED ORAL at 09:08

## 2024-11-30 RX ADMIN — PANTOPRAZOLE SODIUM 40 MG: 40 TABLET, DELAYED RELEASE ORAL at 07:09

## 2024-11-30 RX ADMIN — OXYCODONE 10 MG: 5 TABLET ORAL at 04:44

## 2024-11-30 RX ADMIN — EXTENDED PHENYTOIN SODIUM 100 MG: 100 CAPSULE ORAL at 09:08

## 2024-11-30 RX ADMIN — Medication 2000 UNITS: at 09:08

## 2024-11-30 ASSESSMENT — PAIN SCALES - GENERAL
PAINLEVEL_OUTOF10: 7
PAINLEVEL_OUTOF10: 10

## 2024-11-30 ASSESSMENT — PAIN DESCRIPTION - DESCRIPTORS: DESCRIPTORS: ACHING;DISCOMFORT;SORE

## 2024-11-30 ASSESSMENT — PAIN DESCRIPTION - ORIENTATION: ORIENTATION: LEFT

## 2024-11-30 ASSESSMENT — PAIN - FUNCTIONAL ASSESSMENT: PAIN_FUNCTIONAL_ASSESSMENT: PREVENTS OR INTERFERES WITH ALL ACTIVE AND SOME PASSIVE ACTIVITIES

## 2024-11-30 ASSESSMENT — PAIN DESCRIPTION - LOCATION: LOCATION: KNEE;LEG

## 2024-11-30 NOTE — PROGRESS NOTES
Spiritual Health History and Assessment/Progress Note  [facility]    Initial Encounter,  ,  ,      Name: Jemima Shook MRN: [id number]    Age: 64 y.o.     Sex: male   Language: English   Confucianism: Denominational   Arthritis of left knee     Date: 11/30/2024                           Spiritual Assessment began in [facility] INTERMDIATE MED SURG        Referral/Consult From: Rounding   Encounter Overview/Reason: Initial Encounter  Service Provided For: Patient    Maisha, Belief, Meaning:   Patient has beliefs or practices that help with coping during difficult times  Family/Friends No family/friends present      Importance and Influence:  Patient has no beliefs influential to healthcare decision-making identified during this visit  Family/Friends No family/friends present    Community:  Patient feels well-supported. Support system includes: Spouse/Partner  Family/Friends No family/friends present    Assessment and Plan of Care:     Patient Interventions include: Facilitated expression of thoughts and feelings  Family/Friends Interventions include: No family/friends present    Patient Plan of Care: Spiritual Care available upon further referral  Family/Friends Plan of Care: No family/friends present    Electronically signed by Chaplain Josefina on 11/30/2024 at 11:49 AM

## 2024-11-30 NOTE — DISCHARGE SUMMARY
Discharge Summary     Patient ID:  Jemima Shook  [id number]  64 y.o.  1960    Admit date: 11/29/2024    Discharge date and time: 11/30/2024, 12:37 PM    Admitting Physician: Frederick Walsh DO     Discharge Physician: Same    Admission Diagnoses: left Knee Osteoarthritis    Discharge Diagnoses: Same    Admission Condition: good    Discharged Condition: good    Procedure and Date: left Total Knee Arthroplasty     Hospital Course: A left total knee arthroplasty was performed on 11/29/2024.  Following this procedure the patient was admitted for overnight medical observation.  He underwent physical and Occupational Therapy during his stay.  There were no apparent complications.  He was able to be discharged on postop day #1    Consults: None    Significant Diagnostic Studies: Routine postoperative lab work    Discharge Exam:  /72   Pulse 85   Temp 98.1 °F (36.7 °C) (Oral)   Resp 18   Ht 1.778 m (5' 10\")   Wt 88.9 kg (196 lb)   SpO2 97%   BMI 28.12 kg/m²     General Appearance:    Alert, cooperative, no distress, appears stated age   Head:    Normocephalic, without obvious abnormality, atraumatic   Eyes:    PERRL, conjunctiva/corneas clear, EOM's intact, fundi     benign, both eyes        Ears:    Normal TM's and external ear canals, both ears   Nose:   Nares normal, septum midline, mucosa normal, no drainage    or sinus tenderness   Throat:   Lips, mucosa, and tongue normal; teeth and gums normal   Neck:   Supple, symmetrical, trachea midline, no adenopathy;        thyroid:  No enlargement/tenderness/nodules; no carotid    bruit or JVD   Back:     Symmetric, no curvature, ROM normal, no CVA tenderness   Lungs:     Clear to auscultation bilaterally, respirations unlabored   Chest wall:    No tenderness or deformity   Heart:    Regular rate and rhythm, S1 and S2 normal, no murmur, rub   or gallop   Abdomen:     Soft, non-tender, bowel sounds active all four quadrants,     no masses, no

## 2024-11-30 NOTE — PROGRESS NOTES
Department of Orthopedic Surgery  Attending Progress Note        Subjective:  No complaints.  Patient resting comfortably in bed.  Had increased nausea immediately postoperatively but states that this has improved significantly.  Otherwise, denies acute issues overnight.    Vitals  VITALS:  /72   Pulse 85   Temp 98.1 °F (36.7 °C) (Oral)   Resp 18   Ht 1.778 m (5' 10\")   Wt 88.9 kg (196 lb)   SpO2 97%   BMI 28.12 kg/m²     PHYSICAL EXAM:    Orientation:  alert and oriented to person, place and time    Left Lower Extremity    Compartments: soft      Incision:  dressing in place, clean, dry, and intact    Upper extremity Motor: 5/5 axillary, mc,m,r,u,ain,pin    Upper extremity sensory: grossly in tact    Lower Extremity Motor :   Quadriceps:  5/5  Extensor hallucis:  5/5  Dorsiflexion:  5/5  Plantarflexion:  5/5    Lower Extremity Sensory:  Tibial Nerve:  intact  Superficial Peroneal Nerve:  intact  Deep Peroneal Nerve:  intact    Pulses:    Posterior Tibial:  2  Dorsalis Pedis:  2  Posterior Tibial Artery:  2    Abnormal Exam findings:  none      LABS:    HgB:    Lab Results   Component Value Date    HGB 10.0 8/28/2011     INR:    No components found with this basename: PTPATIENT, PTINR     CBC:   Lab Results   Component Value Date/Time    WBC 4.9 11/22/2024 11:40 AM    RBC 4.52 11/22/2024 11:40 AM    RBC 4.84 12/16/2022 11:22 AM    HGB 14.8 11/22/2024 11:40 AM    HCT 42.4 11/22/2024 11:40 AM    MCV 93.8 11/22/2024 11:40 AM    MCH 32.7 11/22/2024 11:40 AM    MCHC 34.9 11/22/2024 11:40 AM    RDW 14.6 11/22/2024 11:40 AM     11/22/2024 11:40 AM    MPV 10.5 11/22/2024 11:40 AM       ASSESSMENT AND PLAN:    Post operative day 1 status post left total knee arthroplasty    1:  Weight bearing as tolerated  2:  Deep venous thrombosis prophylaxis -aspirin 325 twice daily  3:  Continue physical therapy  4:  D/C Plan:  Home Health  5:  F/U Plan              6: Patient doing well.  Discharge instructions and

## 2024-11-30 NOTE — PLAN OF CARE
Problem: Discharge Planning  Goal: Discharge to home or other facility with appropriate resources  11/29/2024 2035 by Deanna Pedraza, RN  Outcome: Progressing  11/29/2024 1241 by Nicolasa Arellano RN  Outcome: Progressing     Problem: Pain  Goal: Verbalizes/displays adequate comfort level or baseline comfort level  11/29/2024 2035 by Deanna Pedraza, RN  Outcome: Progressing  11/29/2024 1241 by Nicolasa Arellano RN  Outcome: Progressing     Problem: Safety - Adult  Goal: Free from fall injury  11/29/2024 2035 by Deanna Pedraza, RN  Outcome: Progressing  11/29/2024 1241 by Nicolasa Arellano RN  Outcome: Progressing
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
## 2024-12-03 RX ORDER — PHENYTOIN SODIUM 100 MG/1
100 CAPSULE, EXTENDED RELEASE ORAL 3 TIMES DAILY
Qty: 270 CAPSULE | Refills: 1 | Status: SHIPPED | OUTPATIENT
Start: 2024-12-03

## 2024-12-03 NOTE — TELEPHONE ENCOUNTER
Name of Medication(s) Requested:  Requested Prescriptions     Pending Prescriptions Disp Refills    phenytoin (DILANTIN) 100 MG ER capsule [Pharmacy Med Name: PHENYTOIN SOD  MG CAP] 270 capsule 1     Sig: TAKE 1 CAPSULE BY MOUTH THREE TIMES A DAY       Medication is on current medication list Yes    Dosage and directions were verified? Yes    Quantity verified: 90 day supply     Pharmacy Verified?  Yes    Last Appointment:  11/20/2024    Future appts:  Future Appointments   Date Time Provider Department Center   5/20/2025  3:45 PM John Munoz DO [facility] DE[facility]        (If no appt send self scheduling link. .REFILLAPPT)  Scheduling request sent?     [] Yes  [x] No    Does patient need updated?  [] Yes  [x] No

## 2024-12-05 LAB — SURGICAL PATHOLOGY REPORT: NORMAL

## 2025-02-08 DIAGNOSIS — E55.9 VITAMIN D DEFICIENCY: Chronic | ICD-10-CM

## 2025-02-10 NOTE — TELEPHONE ENCOUNTER
Name of Medication(s) Requested:  Requested Prescriptions     Pending Prescriptions Disp Refills    VITAMIN D3 50 MCG (2000 UT) CAPS capsule [Pharmacy Med Name: VITAMIN D3 50 MCG SOFTGEL] 90 capsule 1     Sig: TAKE 1 CAPSULE BY MOUTH EVERY DAY       Medication is on current medication list Yes    Dosage and directions were verified? Yes    Quantity verified: 90 day supply     Pharmacy Verified?  Yes    Last Appointment:  11/20/2024    Future appts:  Future Appointments   Date Time Provider Department Center   5/20/2025  3:45 PM John Munoz DO [facility] DEP        (If no appt send self scheduling link. .REFILLAPPT)  Scheduling request sent?     [] Yes  [x] No    Does patient need updated?  [] Yes  [x] No

## 2025-02-11 RX ORDER — ACETAMINOPHEN 160 MG
TABLET,DISINTEGRATING ORAL DAILY
Qty: 90 CAPSULE | Refills: 1 | Status: SHIPPED | OUTPATIENT
Start: 2025-02-11

## 2025-02-15 ENCOUNTER — HOSPITAL ENCOUNTER (OUTPATIENT)
Age: 65
Discharge: HOME | End: 2025-02-15
Payer: MEDICARE

## 2025-02-15 LAB
25(OH)D3 SERPL-MCNC: 44.3 NG/ML (ref 30–100)
ALBUMIN SERPL-MCNC: 3.7 G/DL (ref 3.5–5.2)
ALP SERPL-CCNC: 221 U/L (ref 40–129)
ALT SERPL-CCNC: 12 U/L (ref 0–40)
ANION GAP SERPL CALCULATED.3IONS-SCNC: 9 MMOL/L (ref 7–16)
AST SERPL-CCNC: 16 U/L (ref 0–39)
BASOPHILS # BLD: 0.03 K/UL (ref 0–0.2)
BASOPHILS NFR BLD: 1 % (ref 0–2)
BILIRUB SERPL-MCNC: 0.2 MG/DL (ref 0–1.2)
BUN SERPL-MCNC: 13 MG/DL (ref 6–23)
CALCIUM SERPL-MCNC: 9.4 MG/DL (ref 8.6–10.2)
CHLORIDE SERPL-SCNC: 103 MMOL/L (ref 98–107)
CHOLEST SERPL-MCNC: 194 MG/DL
CO2 SERPL-SCNC: 28 MMOL/L (ref 22–29)
CREAT SERPL-MCNC: 0.8 MG/DL (ref 0.7–1.2)
EOSINOPHIL # BLD: 0.11 K/UL (ref 0.05–0.5)
EOSINOPHILS RELATIVE PERCENT: 2 % (ref 0–6)
ERYTHROCYTE [DISTWIDTH] IN BLOOD BY AUTOMATED COUNT: 14.1 % (ref 11.5–15)
GFR, ESTIMATED: >90 ML/MIN/1.73M2
GLUCOSE SERPL-MCNC: 88 MG/DL (ref 74–99)
HCT VFR BLD AUTO: 43.6 % (ref 37–54)
HDLC SERPL-MCNC: 85 MG/DL
HGB BLD-MCNC: 14.7 G/DL (ref 12.5–16.5)
IMM GRANULOCYTES # BLD AUTO: <0.03 K/UL (ref 0–0.58)
IMM GRANULOCYTES NFR BLD: 0 % (ref 0–5)
LDLC SERPL CALC-MCNC: 96 MG/DL
LYMPHOCYTES NFR BLD: 1.34 K/UL (ref 1.5–4)
LYMPHOCYTES RELATIVE PERCENT: 26 % (ref 20–42)
MCH RBC QN AUTO: 31.7 PG (ref 26–35)
MCHC RBC AUTO-ENTMCNC: 33.7 G/DL (ref 32–34.5)
MCV RBC AUTO: 94 FL (ref 80–99.9)
MONOCYTES NFR BLD: 0.62 K/UL (ref 0.1–0.95)
MONOCYTES NFR BLD: 12 % (ref 2–12)
NEUTROPHILS NFR BLD: 60 % (ref 43–80)
NEUTS SEG NFR BLD: 3.14 K/UL (ref 1.8–7.3)
PLATELET # BLD AUTO: 268 K/UL (ref 130–450)
PMV BLD AUTO: 10.9 FL (ref 7–12)
POTASSIUM SERPL-SCNC: 4.2 MMOL/L (ref 3.5–5)
PROT SERPL-MCNC: 7 G/DL (ref 6.4–8.3)
RBC # BLD AUTO: 4.64 M/UL (ref 3.8–5.8)
SODIUM SERPL-SCNC: 140 MMOL/L (ref 132–146)
TRIGL SERPL-MCNC: 64 MG/DL
VLDLC SERPL CALC-MCNC: 13 MG/DL
WBC OTHER # BLD: 5.3 K/UL (ref 4.5–11.5)

## 2025-02-15 PROCEDURE — 36415 COLL VENOUS BLD VENIPUNCTURE: CPT

## 2025-02-15 PROCEDURE — 80061 LIPID PANEL: CPT

## 2025-02-15 PROCEDURE — 80053 COMPREHEN METABOLIC PANEL: CPT

## 2025-02-15 PROCEDURE — 85025 COMPLETE CBC W/AUTO DIFF WBC: CPT

## 2025-02-15 PROCEDURE — 82306 VITAMIN D 25 HYDROXY: CPT

## 2025-04-20 DIAGNOSIS — E78.5 DYSLIPIDEMIA: Chronic | ICD-10-CM

## 2025-04-21 RX ORDER — ATORVASTATIN CALCIUM 10 MG/1
10 TABLET, FILM COATED ORAL NIGHTLY
Qty: 90 TABLET | Refills: 0 | Status: SHIPPED | OUTPATIENT
Start: 2025-04-21

## 2025-04-21 NOTE — TELEPHONE ENCOUNTER
Name of Medication(s) Requested:  Requested Prescriptions     Pending Prescriptions Disp Refills    atorvastatin (LIPITOR) 10 MG tablet [Pharmacy Med Name: ATORVASTATIN 10 MG TABLET] 90 tablet 0     Sig: TAKE 1 TABLET BY MOUTH EVERY DAY AT NIGHT       Medication is on current medication list Yes    Dosage and directions were verified? Yes    Quantity verified: 90 day supply     Pharmacy Verified?  Yes    Last Appointment:  11/20/2024    Future appts:  Future Appointments   Date Time Provider Department Center   5/20/2025  3:45 PM John Munoz DO [facility]        (If no appt send self scheduling link. .REFILLAPPT)  Scheduling request sent?     [] Yes  [x] No    Does patient need updated?  [] Yes  [x] No

## 2025-05-15 ENCOUNTER — HOSPITAL ENCOUNTER (OUTPATIENT)
Age: 65
Discharge: HOME | End: 2025-05-15
Payer: MEDICARE

## 2025-05-15 DIAGNOSIS — Z12.5 PROSTATE CANCER SCREENING: ICD-10-CM

## 2025-05-15 DIAGNOSIS — E55.9 VITAMIN D DEFICIENCY: Chronic | ICD-10-CM

## 2025-05-15 DIAGNOSIS — G47.33 OBSTRUCTIVE SLEEP APNEA: Chronic | ICD-10-CM

## 2025-05-15 DIAGNOSIS — G40.909 SEIZURE DISORDER (HCC): ICD-10-CM

## 2025-05-15 DIAGNOSIS — K21.9 GASTROESOPHAGEAL REFLUX DISEASE WITHOUT ESOPHAGITIS: ICD-10-CM

## 2025-05-15 DIAGNOSIS — E78.5 DYSLIPIDEMIA: Chronic | ICD-10-CM

## 2025-05-15 LAB
25(OH)D3 SERPL-MCNC: 48.6 NG/ML (ref 30–100)
ALBUMIN SERPL-MCNC: 3.9 G/DL (ref 3.5–5.2)
ALP SERPL-CCNC: 171 U/L (ref 40–129)
ALT SERPL-CCNC: 11 U/L (ref 0–40)
ANION GAP SERPL CALCULATED.3IONS-SCNC: 9 MMOL/L (ref 7–16)
AST SERPL-CCNC: 17 U/L (ref 0–39)
BASOPHILS # BLD: 0.02 K/UL (ref 0–0.2)
BASOPHILS NFR BLD: 0 % (ref 0–2)
BILIRUB SERPL-MCNC: 0.3 MG/DL (ref 0–1.2)
BUN SERPL-MCNC: 15 MG/DL (ref 6–23)
CALCIUM SERPL-MCNC: 9 MG/DL (ref 8.6–10.2)
CHLORIDE SERPL-SCNC: 106 MMOL/L (ref 98–107)
CHOLEST SERPL-MCNC: 190 MG/DL
CO2 SERPL-SCNC: 26 MMOL/L (ref 22–29)
CREAT SERPL-MCNC: 0.7 MG/DL (ref 0.7–1.2)
EOSINOPHIL # BLD: 0.09 K/UL (ref 0.05–0.5)
EOSINOPHILS RELATIVE PERCENT: 2 % (ref 0–6)
ERYTHROCYTE [DISTWIDTH] IN BLOOD BY AUTOMATED COUNT: 15.3 % (ref 11.5–15)
GFR, ESTIMATED: >90 ML/MIN/1.73M2
GLUCOSE SERPL-MCNC: 90 MG/DL (ref 74–99)
HCT VFR BLD AUTO: 42.1 % (ref 37–54)
HDLC SERPL-MCNC: 75 MG/DL
HGB BLD-MCNC: 14.6 G/DL (ref 12.5–16.5)
IMM GRANULOCYTES # BLD AUTO: <0.03 K/UL (ref 0–0.58)
IMM GRANULOCYTES NFR BLD: 0 % (ref 0–5)
LDLC SERPL CALC-MCNC: 98 MG/DL
LYMPHOCYTES NFR BLD: 1.66 K/UL (ref 1.5–4)
LYMPHOCYTES RELATIVE PERCENT: 36 % (ref 20–42)
MCH RBC QN AUTO: 32.5 PG (ref 26–35)
MCHC RBC AUTO-ENTMCNC: 34.7 G/DL (ref 32–34.5)
MCV RBC AUTO: 93.8 FL (ref 80–99.9)
MONOCYTES NFR BLD: 0.59 K/UL (ref 0.1–0.95)
MONOCYTES NFR BLD: 13 % (ref 2–12)
NEUTROPHILS NFR BLD: 48 % (ref 43–80)
NEUTS SEG NFR BLD: 2.19 K/UL (ref 1.8–7.3)
PLATELET # BLD AUTO: 258 K/UL (ref 130–450)
PMV BLD AUTO: 11.1 FL (ref 7–12)
POTASSIUM SERPL-SCNC: 4.1 MMOL/L (ref 3.5–5)
PROT SERPL-MCNC: 6.8 G/DL (ref 6.4–8.3)
RBC # BLD AUTO: 4.49 M/UL (ref 3.8–5.8)
SODIUM SERPL-SCNC: 141 MMOL/L (ref 132–146)
TRIGL SERPL-MCNC: 87 MG/DL
VLDLC SERPL CALC-MCNC: 17 MG/DL
WBC OTHER # BLD: 4.6 K/UL (ref 4.5–11.5)

## 2025-05-15 PROCEDURE — 80053 COMPREHEN METABOLIC PANEL: CPT

## 2025-05-15 PROCEDURE — 82306 VITAMIN D 25 HYDROXY: CPT

## 2025-05-15 PROCEDURE — 80061 LIPID PANEL: CPT

## 2025-05-15 PROCEDURE — 36415 COLL VENOUS BLD VENIPUNCTURE: CPT

## 2025-05-15 PROCEDURE — 85025 COMPLETE CBC W/AUTO DIFF WBC: CPT

## 2025-05-20 ENCOUNTER — OFFICE VISIT (OUTPATIENT)
Dept: FAMILY MEDICINE CLINIC | Age: 65
End: 2025-05-20
Payer: MEDICARE

## 2025-05-20 VITALS
HEART RATE: 78 BPM | SYSTOLIC BLOOD PRESSURE: 107 MMHG | BODY MASS INDEX: 28.92 KG/M2 | RESPIRATION RATE: 16 BRPM | OXYGEN SATURATION: 98 % | DIASTOLIC BLOOD PRESSURE: 73 MMHG | TEMPERATURE: 97.5 F | HEIGHT: 70 IN | WEIGHT: 202 LBS

## 2025-05-20 DIAGNOSIS — Z00.00 MEDICARE ANNUAL WELLNESS VISIT, SUBSEQUENT: Primary | ICD-10-CM

## 2025-05-20 PROCEDURE — G0439 PPPS, SUBSEQ VISIT: HCPCS | Performed by: FAMILY MEDICINE

## 2025-05-20 PROCEDURE — 1123F ACP DISCUSS/DSCN MKR DOCD: CPT | Performed by: FAMILY MEDICINE

## 2025-05-20 SDOH — HEALTH STABILITY: PHYSICAL HEALTH: ON AVERAGE, HOW MANY DAYS PER WEEK DO YOU ENGAGE IN MODERATE TO STRENUOUS EXERCISE (LIKE A BRISK WALK)?: 0 DAYS

## 2025-05-20 SDOH — HEALTH STABILITY: PHYSICAL HEALTH: ON AVERAGE, HOW MANY MINUTES DO YOU ENGAGE IN EXERCISE AT THIS LEVEL?: 0 MIN

## 2025-05-20 ASSESSMENT — PATIENT HEALTH QUESTIONNAIRE - PHQ9
1. LITTLE INTEREST OR PLEASURE IN DOING THINGS: NOT AT ALL
2. FEELING DOWN, DEPRESSED OR HOPELESS: NOT AT ALL
SUM OF ALL RESPONSES TO PHQ QUESTIONS 1-9: 0

## 2025-05-20 ASSESSMENT — LIFESTYLE VARIABLES
HOW OFTEN DO YOU HAVE SIX OR MORE DRINKS ON ONE OCCASION: 1
HOW OFTEN DO YOU HAVE A DRINK CONTAINING ALCOHOL: 1
HOW OFTEN DO YOU HAVE A DRINK CONTAINING ALCOHOL: NEVER
HOW MANY STANDARD DRINKS CONTAINING ALCOHOL DO YOU HAVE ON A TYPICAL DAY: PATIENT DOES NOT DRINK
HOW MANY STANDARD DRINKS CONTAINING ALCOHOL DO YOU HAVE ON A TYPICAL DAY: 0

## 2025-05-20 NOTE — PATIENT INSTRUCTIONS
as acetaminophen (Tylenol).   When should you call for help?   Call 911 if you have symptoms of a heart attack. These may include:    Chest pain or pressure, or a strange feeling in the chest.     Sweating.     Shortness of breath.     Pain, pressure, or a strange feeling in the back, neck, jaw, or upper belly or in one or both shoulders or arms.     Lightheadedness or sudden weakness.     A fast or irregular heartbeat.   After you call 911, the  may tell you to chew 1 adult-strength or 2 to 4 low-dose aspirin. Wait for an ambulance. Do not try to drive yourself.  Watch closely for changes in your health, and be sure to contact your doctor if you have any problems.  Where can you learn more?  Go to https://www.[employer].net/patientEd and enter F075 to learn more about \"A Healthy Heart: Care Instructions.\"  Current as of: July 31, 2024  Content Version: 14.4  © 6750-7612 [employer].   Care instructions adapted under license by [employer] If you have questions about a medical condition or this instruction, always ask your healthcare professional. [employer] disclaims any warranty or liability for your use of this information.    Personalized Preventive Plan for Jemima Shook - 5/20/2025  Medicare offers a range of preventive health benefits. Some of the tests and screenings are paid in full while other may be subject to a deductible, co-insurance, and/or copay.  Some of these benefits include a comprehensive review of your medical history including lifestyle, illnesses that may run in your family, and various assessments and screenings as appropriate.  After reviewing your medical record and screening and assessments performed today your provider may have ordered immunizations, labs, imaging, and/or referrals for you.  A list of these orders (if applicable) as well as your Preventive Care list are included within your After Visit Summary for your review.

## 2025-05-20 NOTE — PROGRESS NOTES
Medicare Annual Wellness Visit    Jemima Shook is here for Medicare AWV (No issues)    Assessment & Plan  1. Annual wellness examination.  - Unsteadiness and fall risk were discussed; no recent falls reported.  - Physical activity goals were set to achieve 30 minutes of exercise per session, at least 4 days a week, with safe options like an exercise bike.  - Dietary recommendations were provided, emphasizing lean proteins, non-starchy vegetables, fruits, berries, and healthy fats, while avoiding processed, prepackaged, restaurant, and fast foods.  - Forms for establishing a living will and healthcare power of  were provided; he was instructed to submit any existing healthcare power of  documents for inclusion in his medical record.    2. Knee pain.  - Improvement in knee pain following knee replacement surgery on 11/29/2024 was reported.  - Physical exam findings indicate the knee is getting better.  - He was advised to continue using the exercise bike at home if it does not cause any problems.  - No further orthopedic specialist follow-up is required at this time.    Medicare annual wellness visit, subsequent    Results       Return for Medicare Annual Wellness Visit in 1 year.     Subjective     History of Present Illness  The patient is a 65-year-old male who presents for an annual wellness visit.    He reports no recent falls but mentions a near-fall incident on the steps. He utilizes a walker for mobility and occasionally uses an exercise bike. His diet includes regular consumption of beets, carrots, peas, tomatoes, and salads. He has designated his older sister as his healthcare power of  and expresses a desire for full resuscitation efforts in the event of a medical emergency.    He experiences knee pain, which he notes is improving. He underwent knee replacement surgery on 11/29/2024 and reports no current knee pain. He has completed his consultations with orthopedic

## 2025-07-20 DIAGNOSIS — K21.9 GASTROESOPHAGEAL REFLUX DISEASE WITHOUT ESOPHAGITIS: Chronic | ICD-10-CM

## 2025-07-21 DIAGNOSIS — E78.5 DYSLIPIDEMIA: Chronic | ICD-10-CM

## 2025-07-21 RX ORDER — OMEPRAZOLE 20 MG/1
CAPSULE, DELAYED RELEASE ORAL DAILY
Qty: 90 CAPSULE | Refills: 0 | Status: SHIPPED | OUTPATIENT
Start: 2025-07-21

## 2025-07-21 NOTE — TELEPHONE ENCOUNTER
Name of Medication(s) Requested:  Requested Prescriptions     Pending Prescriptions Disp Refills    omeprazole (PRILOSEC) 20 MG delayed release capsule [Pharmacy Med Name: OMEPRAZOLE DR 20 MG CAPSULE] 90 capsule 1     Sig: TAKE 1 CAPSULE BY MOUTH EVERY DAY       Medication is on current medication list Yes    Dosage and directions were verified? Yes    Quantity verified: 90 day supply     Pharmacy Verified?  Yes    Last Appointment:  5/20/2025    Future appts:  Future Appointments   Date Time Provider Department Center   5/28/2026  2:00 PM John Munoz DO [facility] DE[facility]        (If no appt send self scheduling link. .REFILLAPPT)  Scheduling request sent?     [] Yes  [x] No    Does patient need updated?  [] Yes  [x] No

## 2025-07-22 RX ORDER — ATORVASTATIN CALCIUM 10 MG/1
10 TABLET, FILM COATED ORAL NIGHTLY
Qty: 90 TABLET | Refills: 0 | Status: SHIPPED | OUTPATIENT
Start: 2025-07-22

## 2025-07-22 NOTE — TELEPHONE ENCOUNTER
Last Appointment:  5/20/2025  Future Appointments   Date Time Provider Department Center   5/28/2026  2:00 PM John Munoz DO [facility]

## 2025-08-11 ENCOUNTER — TELEPHONE (OUTPATIENT)
Dept: FAMILY MEDICINE CLINIC | Age: 65
End: 2025-08-11

## 2025-08-17 DIAGNOSIS — G40.909 SEIZURE DISORDER (HCC): Chronic | ICD-10-CM

## 2025-08-18 RX ORDER — PHENYTOIN SODIUM 100 MG/1
100 CAPSULE, EXTENDED RELEASE ORAL 3 TIMES DAILY
Qty: 270 CAPSULE | Refills: 0 | Status: SHIPPED | OUTPATIENT
Start: 2025-08-18

## 2025-08-27 DIAGNOSIS — E55.9 VITAMIN D DEFICIENCY: Chronic | ICD-10-CM

## 2025-08-27 RX ORDER — ACETAMINOPHEN 160 MG
TABLET,DISINTEGRATING ORAL DAILY
Qty: 90 CAPSULE | Refills: 1 | Status: SHIPPED | OUTPATIENT
Start: 2025-08-27

## (undated) DEVICE — COVER,BOOT,FOAM,NON-SKID,HOOK-LOOP,XLG: Brand: MEDLINE INDUSTRIES, INC.

## (undated) DEVICE — SOLUTION IV IRRIG POUR BRL 0.9% SODIUM CHL 2F7124

## (undated) DEVICE — 3M™ STERI-DRAPE™ U-DRAPE 1015: Brand: STERI-DRAPE™

## (undated) DEVICE — NEEDLE SPNL L3.5IN PNK HUB S STL REG WALL FIT STYL W/ QNCKE

## (undated) DEVICE — BASIC SINGLE BASIN 1-LF: Brand: MEDLINE INDUSTRIES, INC.

## (undated) DEVICE — TUBE IRRIG HNDPC HI FLO TP INTRPULS W/SUCTION TUBE

## (undated) DEVICE — TOWEL,OR,DSP,ST,BLUE,STD,6/PK,12PK/CS: Brand: MEDLINE

## (undated) DEVICE — GAUZE,SPONGE,4"X4",8PLY,STRL,LF,10/TRAY: Brand: MEDLINE

## (undated) DEVICE — 2108 SERIES SAGITTAL BLADE, OFFSET (20.0 X 0.89 X 80.0MM)

## (undated) DEVICE — SWITCH DRAPE TENET 7633

## (undated) DEVICE — SYSTEM VAC MIX SGL DBL CLEARMIX 10 PER CA

## (undated) DEVICE — GLOVE ORANGE PI 8   MSG9080

## (undated) DEVICE — CHLORAPREP 26ML ORANGE

## (undated) DEVICE — BLADE,STAINLESS-STEEL,10,STRL,DISPOSABLE: Brand: MEDLINE

## (undated) DEVICE — PENCIL ES L3M BTTN SWCH HOLSTER W/ BLDE ELECTRD EDGE

## (undated) DEVICE — DRAPE,REIN 53X77,STERILE: Brand: MEDLINE

## (undated) DEVICE — HANDPIECE SET WITH COAXIAL HIGH FLOW TIP AND SUCTION TUBE: Brand: INTERPULSE

## (undated) DEVICE — GUIDE ORTH RVS SHLDR BNE RT MINI MODEL COMPHSVE

## (undated) DEVICE — GOWN,SIRUS,POLYRNF,BRTHSLV,XLN/XL,20/CS: Brand: MEDLINE

## (undated) DEVICE — SOLUTION IRRIG 1000ML 0.9% SOD CHL USP POUR PLAS BTL

## (undated) DEVICE — ELECTRODE PT RET AD L9FT HI MOIST COND ADH HYDRGEL CORDED

## (undated) DEVICE — ZIMMER® STERILE DISPOSABLE TOURNIQUET CUFF WITH PLC, DUAL PORT, SINGLE BLADDER, 34 IN. (86 CM)

## (undated) DEVICE — ALCOHOL RUBBING ISO 16OZ 70%

## (undated) DEVICE — STRIP,CLOSURE,WOUND,MEDI-STRIP,1/2X4: Brand: MEDLINE

## (undated) DEVICE — BANDAGE,GAUZE,4.5"X4.1YD,STERILE,LF: Brand: MEDLINE

## (undated) DEVICE — KIT BEDSIDE REVITAL OX 500ML

## (undated) DEVICE — SHOULDER STABILIZATION KIT,                                    DISPOSABLE 12 PER BOX

## (undated) DEVICE — BIT DRL DIA2.7MM PERIPH SCR REUSE FOR COMPHSVE REV SHLDR

## (undated) DEVICE — MASK,FACE,MAXFLUIDPROTECT,SHIELD/ERLPS: Brand: MEDLINE

## (undated) DEVICE — GOWN,SIRUS,POLYRNF,BRTHSLV,XL,30/CS: Brand: MEDLINE

## (undated) DEVICE — SOLUTION IRRIG 1000ML STRL H2O USP PLAS POUR BTL

## (undated) DEVICE — DRAPE,SHOULDER,ORTHOMAX,W/POUCH,5/CS: Brand: MEDLINE

## (undated) DEVICE — 3M™ IOBAN™ 2 ANTIMICROBIAL INCISE DRAPE 6650EZ: Brand: IOBAN™ 2

## (undated) DEVICE — FORCEPS BX L240CM JAW DIA2.8MM L CAP W/ NDL MIC MESH TOOTH

## (undated) DEVICE — BANDAGE ELASTIC COMPRSS ESMRK 4.0INX3.0YD

## (undated) DEVICE — BANDAGE COMPR W6INXL12FT SMOOTH FOR LIMB EXSANG ESMARCH

## (undated) DEVICE — SOLUTION IRRIG 3000ML 0.9% SOD CHL USP UROMATIC PLAS CONT

## (undated) DEVICE — SOLUTION SURG PREP 26 CC PURPREP

## (undated) DEVICE — 4-PORT MANIFOLD: Brand: NEPTUNE 2

## (undated) DEVICE — LUBRICANT SURG JELLY ST BACTER TUBE 4.25OZ

## (undated) DEVICE — 3M™ COBAN™ NL STERILE NON-LATEX SELF-ADHERENT WRAP, 2084S, 4 IN X 5 YD (10 CM X 4,5 M), 18 ROLLS/CASE: Brand: 3M™ COBAN™

## (undated) DEVICE — KENDALL 450 SERIES MONITORING FOAM ELECTRODE - RECTANGULAR SHAPE ( 3/PK): Brand: KENDALL

## (undated) DEVICE — CONTAINER SPEC COLL 960ML POLYPR TRIANG GRAD INTAKE/OUTPUT

## (undated) DEVICE — SYRINGE MED 30ML STD CLR PLAS LUERLOCK TIP N CTRL DISP

## (undated) DEVICE — DRAPE 64X41IN RADIOLOGY C ARM EQUIP STER

## (undated) DEVICE — GLOVE ORANGE PI 8 1/2   MSG9085

## (undated) DEVICE — DBD-PACK,SHOULDER III: Brand: MEDLINE

## (undated) DEVICE — SYRINGE IRRIG 60ML SFT PLIABLE BLB EZ TO GRP 1 HND USE W/

## (undated) DEVICE — ADHESIVE SKIN CLSR 0.7ML TOP DERMBND ADV

## (undated) DEVICE — TUBING, SUCTION, 9/32" X 10', STRAIGHT: Brand: MEDLINE

## (undated) DEVICE — GLOVE SURG SZ 8 L12IN FNGR THK79MIL GRN LTX FREE

## (undated) DEVICE — DOUBLE BASIN SET: Brand: MEDLINE INDUSTRIES, INC.

## (undated) DEVICE — GAUZE,SPONGE,4"X4",16PLY,STRL,LF,10/TRAY: Brand: MEDLINE

## (undated) DEVICE — Device

## (undated) DEVICE — DRAPE,TOP,102X53,STERILE: Brand: MEDLINE

## (undated) DEVICE — SHEET DRAPE FULL 70X100

## (undated) DEVICE — SET MAJOR INSTR ORTHO

## (undated) DEVICE — TUBING, SUCTION, 1/4" X 10', STRAIGHT: Brand: MEDLINE

## (undated) DEVICE — NEEDLE,22GX1.5",REG,BEVEL: Brand: MEDLINE

## (undated) DEVICE — PAD,ABDOMINAL,5"X9",ST,LF,25/BX: Brand: MEDLINE INDUSTRIES, INC.

## (undated) DEVICE — BNDG,ELSTC,MATRIX,STRL,6"X5YD,LF,HOOK&LP: Brand: MEDLINE

## (undated) DEVICE — SUTURE FIBERTAPE SZ 2-0 L36IN NONABSORBABLE BLU 2MM EA END AR7237

## (undated) DEVICE — BLADE RMR L46MM PAT PILOT H

## (undated) DEVICE — PADDING CAST W6INXL4YD COT LO LINTING WYTEX

## (undated) DEVICE — SPONGE,LAP,12"X12",XR,ST,5/PK,40PK/CS: Brand: MEDLINE

## (undated) DEVICE — SUTURE STRATAFIX SYMMETRIC SZ 1 L18IN ABSRB VLT CT1 L36CM SXPP1A404

## (undated) DEVICE — DRESSING HYDROFIBER AQUACEL AG ADVANTAGE 3.5X10 IN

## (undated) DEVICE — LIQUIBAND RAPID ADHESIVE 36/CS 0.8ML: Brand: MEDLINE

## (undated) DEVICE — CONVERTORS STOCKINETTE: Brand: CONVERTORS

## (undated) DEVICE — GOWN,SIRUS,POLYRNF,BRTHSLV,LG,30/CS: Brand: MEDLINE

## (undated) DEVICE — COVER,LIGHT HANDLE,FLX,1/PK: Brand: MEDLINE INDUSTRIES, INC.

## (undated) DEVICE — TUBING SUCT 12FR MAL ALUM SHFT FN CAP VENT UNIV CONN W/ OBT

## (undated) DEVICE — 6 X 9  1.75MIL 4-WALL LABGUARD: Brand: MINIGRIP COMMERCIAL LLC

## (undated) DEVICE — STOCKINETTE: Brand: CONVERTORS

## (undated) DEVICE — SMARTGOWN BREATHABLE SURGICAL GOWN: Brand: CONVERTORS

## (undated) DEVICE — SPONGE LAP W18XL18IN WHT COT 4 PLY FLD STRUNG RADPQ DISP ST 2 PER PACK

## (undated) DEVICE — INSTRUMENT SCREW BNE L25MM DIA2.5MM KNEE FULL THRD HEX FEM PERSONA

## (undated) DEVICE — INTENDED FOR TISSUE SEPARATION, AND OTHER PROCEDURES THAT REQUIRE A SHARP SURGICAL BLADE TO PUNCTURE OR CUT.: Brand: BARD-PARKER ® STAINLESS STEEL BLADES

## (undated) DEVICE — PACK PROCEDURE SURG GEN CUST

## (undated) DEVICE — APPLICATOR MEDICATED 26 CC SOLUTION HI LT ORNG CHLORAPREP

## (undated) DEVICE — TAPE ADH W3INXL10YD WHT COT WVN BK POWERFUL RUB BASE HIGHLY

## (undated) DEVICE — DRESSING GZ XRFRM 4X4(25/BX 6BX/CS)

## (undated) DEVICE — WIPES SKIN CLOTH READYPREP 9 X 10.5 IN 2% CHLORHEX GLUCONATE CHG PREOP

## (undated) DEVICE — SPONGE GZ 4IN 4IN 4 PLY N WVN AVANT

## (undated) DEVICE — NDL CNTR 40CT FM MAG: Brand: MEDLINE INDUSTRIES, INC.

## (undated) DEVICE — PIN HOLDING HDLSS 3.2X75 MM TROCAR ZUK

## (undated) DEVICE — T-MAX DISPOSABLE FACE MASK 8 PER BOX

## (undated) DEVICE — Device: Brand: STABLECUT®

## (undated) DEVICE — GOWN ISOLATN REG YEL M WT MULTIPLY SIDETIE LEV 2

## (undated) DEVICE — PEEL-AWAY HOOD: Brand: FLYTE, SURGICOOL

## (undated) DEVICE — BANDAGE,SELF ADHRNT,COFLEX,4"X5YD,STRL: Brand: COLABEL

## (undated) DEVICE — BLADE CLIPPER GEN PURP NS

## (undated) DEVICE — SUTURE FIBERWIRE SZ 2 W/ TAPERED NEEDLE BLUE L38IN NONABSORB BLU L26.5MM 1/2 CIRCLE AR7200

## (undated) DEVICE — Device: Brand: DEFENDO VALVE AND CONNECTOR KIT

## (undated) DEVICE — NEEDLE HYPO 23GA L1.5IN TURQ POLYPR HUB S STL THN WALL IM

## (undated) DEVICE — MARKER,SKIN,WI/RULER AND LABELS: Brand: MEDLINE

## (undated) DEVICE — DRESSING HYDROFIBER AQUACEL AG ADVANTAGE 3.5X12 IN

## (undated) DEVICE — PACK,SHOULDER II,SIRUS: Brand: MEDLINE

## (undated) DEVICE — 3 BONE CEMENT MIXER: Brand: MIXEVAC